# Patient Record
Sex: FEMALE | Race: BLACK OR AFRICAN AMERICAN | NOT HISPANIC OR LATINO
[De-identification: names, ages, dates, MRNs, and addresses within clinical notes are randomized per-mention and may not be internally consistent; named-entity substitution may affect disease eponyms.]

---

## 2017-03-24 ENCOUNTER — APPOINTMENT (OUTPATIENT)
Dept: FAMILY MEDICINE | Facility: CLINIC | Age: 62
End: 2017-03-24

## 2017-04-25 ENCOUNTER — APPOINTMENT (OUTPATIENT)
Dept: FAMILY MEDICINE | Facility: CLINIC | Age: 62
End: 2017-04-25

## 2017-04-25 VITALS
BODY MASS INDEX: 33.11 KG/M2 | RESPIRATION RATE: 14 BRPM | SYSTOLIC BLOOD PRESSURE: 130 MMHG | WEIGHT: 206 LBS | HEART RATE: 72 BPM | TEMPERATURE: 98 F | DIASTOLIC BLOOD PRESSURE: 80 MMHG | HEIGHT: 66 IN

## 2017-04-25 DIAGNOSIS — Z12.12 ENCOUNTER FOR SCREENING FOR MALIGNANT NEOPLASM OF COLON: ICD-10-CM

## 2017-04-25 DIAGNOSIS — Z12.11 ENCOUNTER FOR SCREENING FOR MALIGNANT NEOPLASM OF COLON: ICD-10-CM

## 2018-03-05 ENCOUNTER — APPOINTMENT (OUTPATIENT)
Dept: FAMILY MEDICINE | Facility: CLINIC | Age: 63
End: 2018-03-05
Payer: COMMERCIAL

## 2018-03-05 VITALS
HEART RATE: 76 BPM | WEIGHT: 206 LBS | TEMPERATURE: 98 F | OXYGEN SATURATION: 97 % | HEIGHT: 66 IN | RESPIRATION RATE: 14 BRPM | DIASTOLIC BLOOD PRESSURE: 80 MMHG | SYSTOLIC BLOOD PRESSURE: 148 MMHG | BODY MASS INDEX: 33.11 KG/M2

## 2018-03-05 PROCEDURE — 82962 GLUCOSE BLOOD TEST: CPT

## 2018-03-05 PROCEDURE — 99214 OFFICE O/P EST MOD 30 MIN: CPT | Mod: 25

## 2018-03-06 ENCOUNTER — MEDICATION RENEWAL (OUTPATIENT)
Age: 63
End: 2018-03-06

## 2018-03-06 LAB — GLUCOSE BLDC GLUCOMTR-MCNC: 323

## 2018-03-06 RX ORDER — AMOXICILLIN AND CLAVULANATE POTASSIUM 875; 125 MG/1; MG/1
875-125 TABLET, COATED ORAL
Qty: 14 | Refills: 0 | Status: DISCONTINUED | COMMUNITY
Start: 2018-03-05 | End: 2018-03-06

## 2018-04-19 ENCOUNTER — LABORATORY RESULT (OUTPATIENT)
Age: 63
End: 2018-04-19

## 2018-04-20 ENCOUNTER — APPOINTMENT (OUTPATIENT)
Dept: FAMILY MEDICINE | Facility: CLINIC | Age: 63
End: 2018-04-20
Payer: COMMERCIAL

## 2018-04-20 VITALS
RESPIRATION RATE: 14 BRPM | WEIGHT: 200 LBS | DIASTOLIC BLOOD PRESSURE: 80 MMHG | BODY MASS INDEX: 32.28 KG/M2 | TEMPERATURE: 97 F | SYSTOLIC BLOOD PRESSURE: 160 MMHG | HEART RATE: 80 BPM | OXYGEN SATURATION: 99 %

## 2018-04-20 DIAGNOSIS — Z12.31 ENCOUNTER FOR SCREENING MAMMOGRAM FOR MALIGNANT NEOPLASM OF BREAST: ICD-10-CM

## 2018-04-20 PROCEDURE — 99214 OFFICE O/P EST MOD 30 MIN: CPT

## 2018-04-20 RX ORDER — SULFAMETHOXAZOLE AND TRIMETHOPRIM 800; 160 MG/1; MG/1
800-160 TABLET ORAL TWICE DAILY
Qty: 1 | Refills: 0 | Status: COMPLETED | COMMUNITY
Start: 2018-03-06 | End: 2018-04-20

## 2018-04-20 RX ORDER — AMOXICILLIN 500 MG/1
500 CAPSULE ORAL
Qty: 21 | Refills: 0 | Status: COMPLETED | COMMUNITY
Start: 2018-02-13

## 2018-04-26 ENCOUNTER — MEDICATION RENEWAL (OUTPATIENT)
Age: 63
End: 2018-04-26

## 2018-05-18 ENCOUNTER — APPOINTMENT (OUTPATIENT)
Dept: FAMILY MEDICINE | Facility: CLINIC | Age: 63
End: 2018-05-18

## 2018-05-18 NOTE — COUNSELING
[Weight management counseling provided] : Weight management [Healthy eating counseling provided] : healthy eating [Activity counseling provided] : activity [Weight Self Once Weekly] : Weight self once weekly [Low Fat Diet] : Low fat diet [Decrease Portions] : Decrease food portions [Low Salt Diet] : Low salt diet [___ min/wk activity recommended] : [unfilled] min/wk activity recommended [Walking] : Walking [None] : None [Good understanding] : Patient has a good understanding of lifestyle changes and the steps needed to achieve self management goals

## 2018-05-18 NOTE — HISTORY OF PRESENT ILLNESS
[FreeTextEntry1] : HTN BP Check Uncontrolled DM2 [de-identified] : 61 y/o uncontrollled DM2 and HTN here repeat BP taking meds. Pt most recent a1c 10.6 on levimir.  Last time sent DM2 educator FOBT and mammo

## 2018-06-05 ENCOUNTER — APPOINTMENT (OUTPATIENT)
Dept: FAMILY MEDICINE | Facility: CLINIC | Age: 63
End: 2018-06-05
Payer: COMMERCIAL

## 2018-06-05 VITALS
RESPIRATION RATE: 14 BRPM | WEIGHT: 210 LBS | BODY MASS INDEX: 33.75 KG/M2 | HEIGHT: 66 IN | HEART RATE: 85 BPM | OXYGEN SATURATION: 97 % | TEMPERATURE: 98.6 F | SYSTOLIC BLOOD PRESSURE: 160 MMHG | DIASTOLIC BLOOD PRESSURE: 84 MMHG

## 2018-06-05 DIAGNOSIS — Z92.89 PERSONAL HISTORY OF OTHER MEDICAL TREATMENT: ICD-10-CM

## 2018-06-05 DIAGNOSIS — M54.2 CERVICALGIA: ICD-10-CM

## 2018-06-05 DIAGNOSIS — Z23 ENCOUNTER FOR IMMUNIZATION: ICD-10-CM

## 2018-06-05 DIAGNOSIS — Z92.29 PERSONAL HISTORY OF OTHER DRUG THERAPY: ICD-10-CM

## 2018-06-05 DIAGNOSIS — L03.012 CELLULITIS OF LEFT FINGER: ICD-10-CM

## 2018-06-05 PROCEDURE — 99214 OFFICE O/P EST MOD 30 MIN: CPT

## 2018-06-06 PROBLEM — L03.012 PARONYCHIA OF LEFT THUMB: Status: RESOLVED | Noted: 2018-03-05 | Resolved: 2018-06-06

## 2018-06-06 PROBLEM — M54.2 CERVICAL SPINE PAIN: Status: ACTIVE | Noted: 2018-06-05
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
## 2018-06-06 NOTE — COUNSELING
[Weight management counseling provided] : Weight management [Healthy eating counseling provided] : healthy eating [Activity counseling provided] : activity [None] : None [Good understanding] : Patient has a good understanding of lifestyle changes and the steps needed to achieve self management goals [de-identified] : need to comply with meds and get A1c <7 hgoal\par referred DM2 educator again and referrals given foot and eye evaluation\par pt referred PT

## 2018-06-06 NOTE — HEALTH RISK ASSESSMENT
[Intercurrent Urgi Care visits] : went to urgent care [] : No [No falls in past year] : Patient reported no falls in the past year [0] : 2) Feeling down, depressed, or hopeless: Not at all (0) [de-identified] : urgent care visit [[id number]] : 0

## 2018-07-09 ENCOUNTER — OTHER (OUTPATIENT)
Age: 63
End: 2018-07-09

## 2018-07-11 ENCOUNTER — MEDICATION RENEWAL (OUTPATIENT)
Age: 63
End: 2018-07-11

## 2018-07-12 ENCOUNTER — FORM ENCOUNTER (OUTPATIENT)
Age: 63
End: 2018-07-12

## 2018-07-13 ENCOUNTER — OUTPATIENT (OUTPATIENT)
Dept: OUTPATIENT SERVICES | Facility: HOSPITAL | Age: 63
LOS: 1 days | End: 2018-07-13
Payer: COMMERCIAL

## 2018-07-13 ENCOUNTER — APPOINTMENT (OUTPATIENT)
Dept: MAMMOGRAPHY | Facility: HOSPITAL | Age: 63
End: 2018-07-13
Payer: COMMERCIAL

## 2018-07-13 ENCOUNTER — OTHER (OUTPATIENT)
Age: 63
End: 2018-07-13

## 2018-07-13 DIAGNOSIS — Z12.31 ENCOUNTER FOR SCREENING MAMMOGRAM FOR MALIGNANT NEOPLASM OF BREAST: ICD-10-CM

## 2018-07-13 PROCEDURE — 77063 BREAST TOMOSYNTHESIS BI: CPT

## 2018-07-13 PROCEDURE — 77067 SCR MAMMO BI INCL CAD: CPT

## 2018-07-13 PROCEDURE — 77063 BREAST TOMOSYNTHESIS BI: CPT | Mod: 26

## 2018-07-13 PROCEDURE — 77067 SCR MAMMO BI INCL CAD: CPT | Mod: 26

## 2018-07-16 ENCOUNTER — APPOINTMENT (OUTPATIENT)
Dept: FAMILY MEDICINE | Facility: HOSPITAL | Age: 63
End: 2018-07-16

## 2018-07-16 RX ORDER — METFORMIN HYDROCHLORIDE 1000 MG/1
1000 TABLET, EXTENDED RELEASE ORAL DAILY
Qty: 60 | Refills: 5 | Status: DISCONTINUED | COMMUNITY
Start: 2018-04-20 | End: 2018-07-16

## 2018-07-19 ENCOUNTER — APPOINTMENT (OUTPATIENT)
Dept: FAMILY MEDICINE | Facility: CLINIC | Age: 63
End: 2018-07-19

## 2018-07-23 ENCOUNTER — APPOINTMENT (OUTPATIENT)
Dept: MRI IMAGING | Facility: CLINIC | Age: 63
End: 2018-07-23
Payer: COMMERCIAL

## 2018-07-23 ENCOUNTER — OUTPATIENT (OUTPATIENT)
Dept: OUTPATIENT SERVICES | Facility: HOSPITAL | Age: 63
LOS: 1 days | End: 2018-07-23
Payer: COMMERCIAL

## 2018-07-23 DIAGNOSIS — Z00.8 ENCOUNTER FOR OTHER GENERAL EXAMINATION: ICD-10-CM

## 2018-07-23 PROCEDURE — 70551 MRI BRAIN STEM W/O DYE: CPT | Mod: 26

## 2018-07-23 PROCEDURE — 70551 MRI BRAIN STEM W/O DYE: CPT

## 2018-08-01 ENCOUNTER — RX RENEWAL (OUTPATIENT)
Age: 63
End: 2018-08-01

## 2018-08-06 ENCOUNTER — OTHER (OUTPATIENT)
Age: 63
End: 2018-08-06

## 2018-09-14 ENCOUNTER — APPOINTMENT (OUTPATIENT)
Dept: FAMILY MEDICINE | Facility: CLINIC | Age: 63
End: 2018-09-14

## 2018-10-29 ENCOUNTER — APPOINTMENT (OUTPATIENT)
Dept: MRI IMAGING | Facility: CLINIC | Age: 63
End: 2018-10-29

## 2018-10-29 ENCOUNTER — OUTPATIENT (OUTPATIENT)
Dept: OUTPATIENT SERVICES | Facility: HOSPITAL | Age: 63
LOS: 1 days | End: 2018-10-29
Payer: COMMERCIAL

## 2018-10-29 DIAGNOSIS — Z00.8 ENCOUNTER FOR OTHER GENERAL EXAMINATION: ICD-10-CM

## 2018-10-29 PROCEDURE — 72141 MRI NECK SPINE W/O DYE: CPT

## 2018-10-29 PROCEDURE — 72141 MRI NECK SPINE W/O DYE: CPT | Mod: 26

## 2019-02-12 ENCOUNTER — RX RENEWAL (OUTPATIENT)
Age: 64
End: 2019-02-12

## 2019-02-15 ENCOUNTER — APPOINTMENT (OUTPATIENT)
Dept: FAMILY MEDICINE | Facility: CLINIC | Age: 64
End: 2019-02-15
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
## 2019-02-15 NOTE — HISTORY OF PRESENT ILLNESS
[FreeTextEntry1] : f/u [de-identified] : 62 y/o PMHX HTN HLD Uncontrolled DM2 obesity here for f/u

## 2021-04-13 ENCOUNTER — NON-APPOINTMENT (OUTPATIENT)
Age: 66
End: 2021-04-13

## 2021-04-13 ENCOUNTER — INPATIENT (INPATIENT)
Facility: HOSPITAL | Age: 66
LOS: 7 days | Discharge: NURSING, REHAB OR LONG-TERM CARE | DRG: 66 | End: 2021-04-21
Attending: INTERNAL MEDICINE | Admitting: INTERNAL MEDICINE
Payer: COMMERCIAL

## 2021-04-13 VITALS
HEART RATE: 88 BPM | SYSTOLIC BLOOD PRESSURE: 187 MMHG | WEIGHT: 197.98 LBS | RESPIRATION RATE: 17 BRPM | TEMPERATURE: 98 F | HEIGHT: 67 IN | OXYGEN SATURATION: 97 % | DIASTOLIC BLOOD PRESSURE: 104 MMHG

## 2021-04-13 DIAGNOSIS — R20.0 ANESTHESIA OF SKIN: ICD-10-CM

## 2021-04-13 DIAGNOSIS — Z98.890 OTHER SPECIFIED POSTPROCEDURAL STATES: Chronic | ICD-10-CM

## 2021-04-13 LAB
ALBUMIN SERPL ELPH-MCNC: 3.4 G/DL — SIGNIFICANT CHANGE UP (ref 3.3–5)
ALP SERPL-CCNC: 99 U/L — SIGNIFICANT CHANGE UP (ref 40–120)
ALT FLD-CCNC: 32 U/L — SIGNIFICANT CHANGE UP (ref 10–45)
ANION GAP SERPL CALC-SCNC: 6 MMOL/L — SIGNIFICANT CHANGE UP (ref 5–17)
APTT BLD: 34.8 SEC — SIGNIFICANT CHANGE UP (ref 27.5–35.5)
AST SERPL-CCNC: 37 U/L — SIGNIFICANT CHANGE UP (ref 10–40)
BASOPHILS # BLD AUTO: 0.05 K/UL — SIGNIFICANT CHANGE UP (ref 0–0.2)
BASOPHILS NFR BLD AUTO: 0.8 % — SIGNIFICANT CHANGE UP (ref 0–2)
BILIRUB SERPL-MCNC: 0.5 MG/DL — SIGNIFICANT CHANGE UP (ref 0.2–1.2)
BUN SERPL-MCNC: 10 MG/DL — SIGNIFICANT CHANGE UP (ref 7–23)
CALCIUM SERPL-MCNC: 9.7 MG/DL — SIGNIFICANT CHANGE UP (ref 8.4–10.5)
CHLORIDE SERPL-SCNC: 99 MMOL/L — SIGNIFICANT CHANGE UP (ref 96–108)
CO2 BLDV-SCNC: 32 MMOL/L — HIGH (ref 21–29)
CO2 SERPL-SCNC: 30 MMOL/L — SIGNIFICANT CHANGE UP (ref 22–31)
CREAT SERPL-MCNC: 1.16 MG/DL — SIGNIFICANT CHANGE UP (ref 0.5–1.3)
EOSINOPHIL # BLD AUTO: 0.22 K/UL — SIGNIFICANT CHANGE UP (ref 0–0.5)
EOSINOPHIL NFR BLD AUTO: 3.3 % — SIGNIFICANT CHANGE UP (ref 0–6)
GLUCOSE BLDC GLUCOMTR-MCNC: 237 MG/DL — HIGH (ref 70–99)
GLUCOSE BLDC GLUCOMTR-MCNC: 256 MG/DL — HIGH (ref 70–99)
GLUCOSE BLDC GLUCOMTR-MCNC: 276 MG/DL — HIGH (ref 70–99)
GLUCOSE BLDC GLUCOMTR-MCNC: 436 MG/DL — HIGH (ref 70–99)
GLUCOSE SERPL-MCNC: 511 MG/DL — CRITICAL HIGH (ref 70–99)
HCT VFR BLD CALC: 41.3 % — SIGNIFICANT CHANGE UP (ref 34.5–45)
HGB BLD-MCNC: 13.6 G/DL — SIGNIFICANT CHANGE UP (ref 11.5–15.5)
IMM GRANULOCYTES NFR BLD AUTO: 0.3 % — SIGNIFICANT CHANGE UP (ref 0–1.5)
INR BLD: 1.03 RATIO — SIGNIFICANT CHANGE UP (ref 0.88–1.16)
LYMPHOCYTES # BLD AUTO: 2.81 K/UL — SIGNIFICANT CHANGE UP (ref 1–3.3)
LYMPHOCYTES # BLD AUTO: 42.3 % — SIGNIFICANT CHANGE UP (ref 13–44)
MCHC RBC-ENTMCNC: 28.1 PG — SIGNIFICANT CHANGE UP (ref 27–34)
MCHC RBC-ENTMCNC: 32.9 GM/DL — SIGNIFICANT CHANGE UP (ref 32–36)
MCV RBC AUTO: 85.3 FL — SIGNIFICANT CHANGE UP (ref 80–100)
MONOCYTES # BLD AUTO: 0.51 K/UL — SIGNIFICANT CHANGE UP (ref 0–0.9)
MONOCYTES NFR BLD AUTO: 7.7 % — SIGNIFICANT CHANGE UP (ref 2–14)
NEUTROPHILS # BLD AUTO: 3.04 K/UL — SIGNIFICANT CHANGE UP (ref 1.8–7.4)
NEUTROPHILS NFR BLD AUTO: 45.6 % — SIGNIFICANT CHANGE UP (ref 43–77)
NRBC # BLD: 0 /100 WBCS — SIGNIFICANT CHANGE UP (ref 0–0)
PCO2 BLDV: 53 MMHG — HIGH (ref 39–42)
PH BLDV: 7.38 — SIGNIFICANT CHANGE UP (ref 7.35–7.45)
PLATELET # BLD AUTO: 236 K/UL — SIGNIFICANT CHANGE UP (ref 150–400)
PO2 BLDV: 32 MMHG — SIGNIFICANT CHANGE UP (ref 25–45)
POTASSIUM SERPL-MCNC: 5.4 MMOL/L — HIGH (ref 3.5–5.3)
POTASSIUM SERPL-SCNC: 5.4 MMOL/L — HIGH (ref 3.5–5.3)
PROT SERPL-MCNC: 7.9 G/DL — SIGNIFICANT CHANGE UP (ref 6–8.3)
PROTHROM AB SERPL-ACNC: 12.4 SEC — SIGNIFICANT CHANGE UP (ref 10.6–13.6)
RBC # BLD: 4.84 M/UL — SIGNIFICANT CHANGE UP (ref 3.8–5.2)
RBC # FLD: 12.4 % — SIGNIFICANT CHANGE UP (ref 10.3–14.5)
SAO2 % BLDV: 53 % — LOW (ref 67–88)
SARS-COV-2 RNA SPEC QL NAA+PROBE: SIGNIFICANT CHANGE UP
SODIUM SERPL-SCNC: 135 MMOL/L — SIGNIFICANT CHANGE UP (ref 135–145)
TROPONIN I SERPL-MCNC: <.017 NG/ML — LOW (ref 0.02–0.06)
WBC # BLD: 6.65 K/UL — SIGNIFICANT CHANGE UP (ref 3.8–10.5)
WBC # FLD AUTO: 6.65 K/UL — SIGNIFICANT CHANGE UP (ref 3.8–10.5)

## 2021-04-13 PROCEDURE — 93010 ELECTROCARDIOGRAM REPORT: CPT

## 2021-04-13 PROCEDURE — 71045 X-RAY EXAM CHEST 1 VIEW: CPT | Mod: 26

## 2021-04-13 PROCEDURE — 70450 CT HEAD/BRAIN W/O DYE: CPT | Mod: 26,59,MA

## 2021-04-13 PROCEDURE — 99223 1ST HOSP IP/OBS HIGH 75: CPT

## 2021-04-13 PROCEDURE — 0042T: CPT

## 2021-04-13 PROCEDURE — 99285 EMERGENCY DEPT VISIT HI MDM: CPT

## 2021-04-13 PROCEDURE — 70496 CT ANGIOGRAPHY HEAD: CPT | Mod: 26,MA

## 2021-04-13 PROCEDURE — 70498 CT ANGIOGRAPHY NECK: CPT | Mod: 26,MA

## 2021-04-13 RX ORDER — ATORVASTATIN CALCIUM 80 MG/1
80 TABLET, FILM COATED ORAL AT BEDTIME
Refills: 0 | Status: DISCONTINUED | OUTPATIENT
Start: 2021-04-13 | End: 2021-04-21

## 2021-04-13 RX ORDER — SODIUM CHLORIDE 9 MG/ML
1000 INJECTION, SOLUTION INTRAVENOUS
Refills: 0 | Status: DISCONTINUED | OUTPATIENT
Start: 2021-04-13 | End: 2021-04-21

## 2021-04-13 RX ORDER — INSULIN LISPRO 100/ML
3 VIAL (ML) SUBCUTANEOUS
Refills: 0 | Status: DISCONTINUED | OUTPATIENT
Start: 2021-04-13 | End: 2021-04-14

## 2021-04-13 RX ORDER — ENOXAPARIN SODIUM 100 MG/ML
40 INJECTION SUBCUTANEOUS DAILY
Refills: 0 | Status: DISCONTINUED | OUTPATIENT
Start: 2021-04-13 | End: 2021-04-21

## 2021-04-13 RX ORDER — INSULIN HUMAN 100 [IU]/ML
6 INJECTION, SOLUTION SUBCUTANEOUS ONCE
Refills: 0 | Status: COMPLETED | OUTPATIENT
Start: 2021-04-13 | End: 2021-04-13

## 2021-04-13 RX ORDER — INSULIN LISPRO 100/ML
VIAL (ML) SUBCUTANEOUS
Refills: 0 | Status: DISCONTINUED | OUTPATIENT
Start: 2021-04-13 | End: 2021-04-15

## 2021-04-13 RX ORDER — DEXTROSE 50 % IN WATER 50 %
25 SYRINGE (ML) INTRAVENOUS ONCE
Refills: 0 | Status: DISCONTINUED | OUTPATIENT
Start: 2021-04-13 | End: 2021-04-21

## 2021-04-13 RX ORDER — DEXTROSE 50 % IN WATER 50 %
15 SYRINGE (ML) INTRAVENOUS ONCE
Refills: 0 | Status: DISCONTINUED | OUTPATIENT
Start: 2021-04-13 | End: 2021-04-21

## 2021-04-13 RX ORDER — DEXTROSE 50 % IN WATER 50 %
12.5 SYRINGE (ML) INTRAVENOUS ONCE
Refills: 0 | Status: DISCONTINUED | OUTPATIENT
Start: 2021-04-13 | End: 2021-04-21

## 2021-04-13 RX ORDER — SODIUM CHLORIDE 9 MG/ML
1000 INJECTION INTRAMUSCULAR; INTRAVENOUS; SUBCUTANEOUS ONCE
Refills: 0 | Status: COMPLETED | OUTPATIENT
Start: 2021-04-13 | End: 2021-04-13

## 2021-04-13 RX ORDER — PANTOPRAZOLE SODIUM 20 MG/1
40 TABLET, DELAYED RELEASE ORAL
Refills: 0 | Status: DISCONTINUED | OUTPATIENT
Start: 2021-04-13 | End: 2021-04-21

## 2021-04-13 RX ORDER — GLUCAGON INJECTION, SOLUTION 0.5 MG/.1ML
1 INJECTION, SOLUTION SUBCUTANEOUS ONCE
Refills: 0 | Status: DISCONTINUED | OUTPATIENT
Start: 2021-04-13 | End: 2021-04-21

## 2021-04-13 RX ORDER — ACETAMINOPHEN 500 MG
650 TABLET ORAL EVERY 6 HOURS
Refills: 0 | Status: DISCONTINUED | OUTPATIENT
Start: 2021-04-13 | End: 2021-04-21

## 2021-04-13 RX ORDER — ASPIRIN/CALCIUM CARB/MAGNESIUM 324 MG
325 TABLET ORAL ONCE
Refills: 0 | Status: COMPLETED | OUTPATIENT
Start: 2021-04-13 | End: 2021-04-13

## 2021-04-13 RX ORDER — ASPIRIN/CALCIUM CARB/MAGNESIUM 324 MG
81 TABLET ORAL DAILY
Refills: 0 | Status: DISCONTINUED | OUTPATIENT
Start: 2021-04-14 | End: 2021-04-21

## 2021-04-13 RX ORDER — INSULIN GLARGINE 100 [IU]/ML
10 INJECTION, SOLUTION SUBCUTANEOUS AT BEDTIME
Refills: 0 | Status: DISCONTINUED | OUTPATIENT
Start: 2021-04-14 | End: 2021-04-14

## 2021-04-13 RX ORDER — LABETALOL HCL 100 MG
20 TABLET ORAL ONCE
Refills: 0 | Status: COMPLETED | OUTPATIENT
Start: 2021-04-13 | End: 2021-04-13

## 2021-04-13 RX ORDER — INSULIN LISPRO 100/ML
VIAL (ML) SUBCUTANEOUS AT BEDTIME
Refills: 0 | Status: DISCONTINUED | OUTPATIENT
Start: 2021-04-13 | End: 2021-04-17

## 2021-04-13 RX ADMIN — Medication 325 MILLIGRAM(S): at 22:36

## 2021-04-13 RX ADMIN — SODIUM CHLORIDE 1000 MILLILITER(S): 9 INJECTION INTRAMUSCULAR; INTRAVENOUS; SUBCUTANEOUS at 19:48

## 2021-04-13 RX ADMIN — ATORVASTATIN CALCIUM 80 MILLIGRAM(S): 80 TABLET, FILM COATED ORAL at 22:36

## 2021-04-13 RX ADMIN — Medication 20 MILLIGRAM(S): at 19:48

## 2021-04-13 RX ADMIN — INSULIN HUMAN 6 UNIT(S): 100 INJECTION, SOLUTION SUBCUTANEOUS at 19:47

## 2021-04-13 NOTE — H&P ADULT - NSHPPHYSICALEXAM_GEN_ALL_CORE
Vital Signs Last 24 Hrs  T(C): 36.6 (13 Apr 2021 18:22), Max: 36.6 (13 Apr 2021 18:22)  T(F): 97.8 (13 Apr 2021 18:22), Max: 97.8 (13 Apr 2021 18:22)  HR: 74 (13 Apr 2021 21:33) (74 - 88)  BP: 195/85 (13 Apr 2021 21:33) (187/104 - 195/85)  BP(mean): 115 (13 Apr 2021 21:33) (115 - 115)  RR: 16 (13 Apr 2021 21:33) (16 - 17)  SpO2: 100% (13 Apr 2021 21:33) (97% - 100%)  Daily Height in cm: 170.18 (13 Apr 2021 18:22)    Daily   CAPILLARY BLOOD GLUCOSE      POCT Blood Glucose.: 276 mg/dL (13 Apr 2021 20:47)    I&O's Summary      GENERAL: NAD  HEAD:  Normocephalic  EYES: EOMI, PERRLA, conjunctiva and sclera clear  ENMT: No tonsillar erythema, exudates, or enlargement; Moist mucous membranes, No lesions  NECK: Supple, No JVD, no bruit, normal thyroid  NERVOUS SYSTEM:  Alert & Oriented X3, Good concentration; grossly  Motor Strength 5/5 B/L upper and lower extremities; DTRs 2+ intact and symmetric. no facial droop, tongue midline, neg pronator drift, heel shin and finger nose intact. +altered sensory on R side of face, RUE R torso and RLE  CHEST/LUNG: Clear to auscultation bilaterally; No rales, rhonchi, wheezing, or rubs  HEART: Regular rate and rhythm; No murmurs, rubs, or gallops  ABDOMEN: Soft, Nontender, Nondistended; Bowel sounds present. +umbilical hernia  EXTREMITIES:  2+ Peripheral Pulses, No clubbing, cyanosis, or edema  LYMPH: No lymphadenopathy noted  SKIN: No rashes or lesions Vital Signs Last 24 Hrs  T(C): 36.6 (13 Apr 2021 18:22), Max: 36.6 (13 Apr 2021 18:22)  T(F): 97.8 (13 Apr 2021 18:22), Max: 97.8 (13 Apr 2021 18:22)  HR: 74 (13 Apr 2021 21:33) (74 - 88)  BP: 195/85 (13 Apr 2021 21:33) (187/104 - 195/85)  BP(mean): 115 (13 Apr 2021 21:33) (115 - 115)  RR: 16 (13 Apr 2021 21:33) (16 - 17)  SpO2: 100% (13 Apr 2021 21:33) (97% - 100%)  Daily Height in cm: 170.18 (13 Apr 2021 18:22)    Daily   CAPILLARY BLOOD GLUCOSE      POCT Blood Glucose.: 276 mg/dL (13 Apr 2021 20:47)    I&O's Summary      GENERAL: NAD  HEAD:  Normocephalic  EYES: EOMI, PERRLA, conjunctiva and sclera clear  ENMT: No tonsillar erythema, exudates, or enlargement; Moist mucous membranes, No lesions  NECK: Supple, No JVD, no bruit, normal thyroid  NERVOUS SYSTEM:  Alert & Oriented X3, Good concentration; grossly  Motor Strength 5/5 B/L upper and lower extremities; DTRs 2+ intact and symmetric. slight R facial droop, tongue midline, neg pronator drift, heel shin and finger nose intact. +altered sensory on R side of face, RUE R torso and RLE  CHEST/LUNG: Clear to auscultation bilaterally; No rales, rhonchi, wheezing, or rubs  HEART: Regular rate and rhythm; No murmurs, rubs, or gallops  ABDOMEN: Soft, Nontender, Nondistended; Bowel sounds present. +umbilical hernia  EXTREMITIES:  2+ Peripheral Pulses, No clubbing, cyanosis, or edema  LYMPH: No lymphadenopathy noted  SKIN: No rashes or lesions

## 2021-04-13 NOTE — ED PROVIDER NOTE - CLINICAL SUMMARY MEDICAL DECISION MAKING FREE TEXT BOX
Patient presents with right sided numbness since 11AM. No motor deficit or weakness. Patient notes that she has DM and HTN but takes no meds (after several probing questions). She admits to being non compliant. Only numbness noted (decreased sensation to right side). No cranial nerve deficits or other findings. NIH: 1.  no TPA req'd. CT neg. CT A and Perf studies results reviewed. Pt to be admitted for accelerated HTN and uncontrolled DM. D/W Dr Parks.

## 2021-04-13 NOTE — H&P ADULT - NSHPREVIEWOFSYSTEMS_GEN_ALL_CORE
CONSTITUTIONAL: No fever, weight loss, or fatigue  EYES: No eye pain, visual disturbances, or discharge  ENMT:  No difficulty hearing, tinnitus, vertigo; No sinus or throat pain  NECK: No pain or stiffness  RESPIRATORY: No cough, wheezing, chills or hemoptysis; No shortness of breath  CARDIOVASCULAR: No chest pain, palpitations, dizziness, or leg swelling  GASTROINTESTINAL: No abdominal or epigastric pain. No nausea, vomiting, or hematemesis; No diarrhea or constipation. No melena or hematochezia.  GENITOURINARY: No dysuria, frequency, hematuria, or incontinence  NEUROLOGICAL: No headaches, memory loss, loss of strength, numbness, or tremors. AS PER HPI, R SIDED NUMBNESS>   SKIN: No itching, burning, rashes, or lesions   LYMPH NODES: No enlarged glands  ENDOCRINE: No heat or cold intolerance; No hair loss  MUSCULOSKELETAL: No joint pain or swelling; No muscle, back, or extremity pain  PSYCHIATRIC: No depression, anxiety, mood swings, or difficulty sleeping  HEME/LYMPH: No easy bruising, or bleeding gums  ALLERY AND IMMUNOLOGIC: No hives or eczema

## 2021-04-13 NOTE — H&P ADULT - NSHPLABSRESULTS_GEN_ALL_CORE
13.6   6.65  )-----------( 236      ( 13 Apr 2021 18:48 )             41.3       04-13    135  |  99  |  10  ----------------------------<  511<HH>  5.4<H>   |  30  |  1.16    Ca    9.7      13 Apr 2021 18:48    TPro  7.9  /  Alb  3.4  /  TBili  0.5  /  DBili  x   /  AST  37  /  ALT  32  /  AlkPhos  99  04-13         LIVER FUNCTIONS - ( 13 Apr 2021 18:48 )  Alb: 3.4 g/dL / Pro: 7.9 g/dL / ALK PHOS: 99 U/L / ALT: 32 U/L / AST: 37 U/L / GGT: x               PT/INR - ( 13 Apr 2021 18:48 )   PT: 12.4 sec;   INR: 1.03 ratio         PTT - ( 13 Apr 2021 18:48 )  PTT:34.8 sec    CARDIAC MARKERS ( 13 Apr 2021 18:48 )  <.017 ng/mL / x     / x     / x     / x                CAPILLARY BLOOD GLUCOSE      POCT Blood Glucose.: 276 mg/dL (13 Apr 2021 20:47)  POCT Blood Glucose.: 436 mg/dL (13 Apr 2021 18:33)        EKG: NSR at 82bpm, no acute st changes.       CXR: wet read NAPD    ra< from: CT Angio Neck w/ IV Cont (04.13.21 @ 19:02) >    < from: CT Brain Stroke Protocol (04.13.21 @ 19:01) >    IMPRESSION:  HEAD CT: Mild volume loss, microvascular disease, no acute hemorrhage or midline shift.    < end of copied text >      IMPRESSION:    CT PERFUSION demonstrated: No core infarct. No active ischemia, despite multiple focal severe stenoses by CTA.  If symptoms persist consider follow up head CT or MRI, MRA  if no contraindication.    CTA COW:  Multiple bilateral severe focal stenoses noted in proximal M2 branches of both MCAs and bilaterally in the PCAs.    CTA NECK: Patent, ECAs, ICAs, no  hemodynamically significant stenosis at  ICA origins by NASCET criteria.  Bilateral vertebral arteries are patent without flow limiting stenosis.

## 2021-04-13 NOTE — ED PROVIDER NOTE - PHYSICAL EXAMINATION
Alert & Oriented X3, Good concentration; grossly  Motor Strength 5/5 B/L upper and lower extremities; DTRs 2+ intact and symmetric. slight R facial droop, tongue midline, neg pronator drift, heel shin and finger nose intact. +altered sensory on R side of face, RUE R torso and RLE

## 2021-04-13 NOTE — ED ADULT NURSE NOTE - NSIMPLEMENTINTERV_GEN_ALL_ED
Implemented All Universal Safety Interventions:  [place] to call system. Call bell, personal items and telephone within reach. Instruct patient to call for assistance. Room bathroom lighting operational. Non-slip footwear when patient is off stretcher. Physically safe environment: no spills, clutter or unnecessary equipment. Stretcher in lowest position, wheels locked, appropriate side rails in place.

## 2021-04-13 NOTE — H&P ADULT - HISTORY OF PRESENT ILLNESS
65F hx of HTN, HLD, T2DM off meds since 2018 and has not followed up with PMD since then presenting with R sided numbness. Pt related she has not felt well which started about 4 days ago and felt her balance was off and occasional dizziness. Today after waking up, she took a shower at 11AM and at the time noticed exquisite painful sensitivity in the R lower extremity and throughout the day noted numbness in the RLE and RUE and R side of face and even on the R torso. She went to get a pedicure at around 2 PM and noticed again exquisite painful sensitivity in the R foot when placed in water. She did not have slurring of speech, visual issues, dysphagia, or focal weakness. She went to see PMD and advised ED visit to r/o CVA. In ED, afebrile BP: 187/104 P: 74 sat 100% on RA. CT head and CT angio neg for acute infarct but with focal stenosis in bl M2 MCA and bl PCA. Blood glucose was 511. S/p IV Labetalol 20mg in ED and 6U regular insulin.   Pt feels well otherwise - wanted to sign out AMA because she didn't want to be moved upstairs where her mother had passed.  65F hx of HTN, HLD, T2DM off meds since 2018 and has not followed up with PMD since then presenting with R sided numbness. Pt related she has not felt well which started about 4 days ago and felt her balance was off and occasional dizziness. Today after waking up, she took a shower at 11AM and at the time noticed exquisite painful sensitivity in the R lower extremity and throughout the day noted numbness in the RLE and RUE and R side of face and even on the R torso. She went to get a pedicure at around 2 PM and noticed again exquisite painful sensitivity in the R foot when placed in water. She did not have slurring of speech, visual issues, dysphagia, or focal weakness. She went to see PMD and advised ED visit to r/o CVA. In ED, out of window for tPA.  afebrile BP: 187/104 P: 74 sat 100% on RA. CT head and CT angio neg for acute infarct but with focal stenosis in bl M2 MCA and bl PCA. Blood glucose was 511. S/p IV Labetalol 20mg in ED and 6U regular insulin.   Pt feels well otherwise - wanted to sign out AMA because she didn't want to be moved upstairs where her mother had passed.

## 2021-04-13 NOTE — H&P ADULT - NSICDXFAMILYHX_GEN_ALL_CORE_FT
FAMILY HISTORY:  Father  Still living? Unknown  Family history of CVA, Age at diagnosis: Age Unknown

## 2021-04-13 NOTE — ED PROVIDER NOTE - OBJECTIVE STATEMENT
65F hx of HTN, HLD, T2DM off meds since 2018 and has not followed up with PMD since then presenting with R sided numbness. Pt related she has not felt well which started about 4 days ago and felt her balance was off and occasional dizziness. Today after waking up, she took a shower at 11AM and at the time noticed exquisite painful sensitivity in the R lower extremity and throughout the day noted numbness in the RLE and RUE and R side of face and even on the R torso. She went to get a pedicure at around 2 PM and noticed again exquisite painful sensitivity in the R foot when placed in water. She did not have slurring of speech, visual issues, dysphagia, or focal weakness. She went to see PMD and advised ED visit to r/o CVA.

## 2021-04-13 NOTE — ED PROVIDER NOTE - CARE PLAN
Principal Discharge DX:	Numbness  Secondary Diagnosis:	Hyperglycemia  Secondary Diagnosis:	Accelerated hypertension

## 2021-04-13 NOTE — H&P ADULT - ASSESSMENT
65F hx of HTN, HLD, T2DM noncompliant pw R sided numbness with hypertension and hyperglycemia    #CVA with R sided numbness.   not candidate for tPA - out of window.   asa, statin.   ECHO  check lipid, HgA1c.   Neuro checks  neurology consult.   permissive hypertension overnight    # T2DM  with significant hyperglycemia  start weight based insulin on low end dose.   10 U lantus and tid with meals and adjust upwards     #DVT/GI proph.     CAPRINI SCORE [CLOT]    AGE RELATED RISK FACTORS                                                       MOBILITY RELATED FACTORS  [ ] Age 41-60 years                                            (1 Point)                  [ ] Bed rest                                                        (1 Point)  [2 ] Age: 61-74 years                                           (2 Points)                 [ ] Plaster cast                                                   (2 Points)  [ ] Age= 75 years                                              (3 Points)                 [ ] Bed bound for more than 72 hours                 (2 Points)    DISEASE RELATED RISK FACTORS                                               GENDER SPECIFIC FACTORS  [ ] Edema in the lower extremities                       (1 Point)                  [ ] Pregnancy                                                     (1 Point)  [ ] Varicose veins                                               (1 Point)                  [ ] Post-partum < 6 weeks                                   (1 Point)             [1 ] BMI > 25 Kg/m2                                            (1 Point)                  [ ] Hormonal therapy  or oral contraception          (1 Point)                 [ ] Sepsis (in the previous month)                        (1 Point)                  [ ] History of pregnancy complications                 (1 point)  [ ] Pneumonia or serious lung disease                                               [ ] Unexplained or recurrent                     (1 Point)           (in the previous month)                               (1 Point)  [ ] Abnormal pulmonary function test                     (1 Point)                 SURGERY RELATED RISK FACTORS  [ ] Acute myocardial infarction                              (1 Point)                 [ ]  Section                                             (1 Point)  [ ] Congestive heart failure (in the previous month)  (1 Point)               [ ] Minor surgery                                                  (1 Point)   [ ] Inflammatory bowel disease                             (1 Point)                 [ ] Arthroscopic surgery                                        (2 Points)  [ ] Central venous access                                      (2 Points)                [ ] General surgery lasting more than 45 minutes   (2 Points)       [ ] Stroke (in the previous month)                          (5 Points)               [ ] Elective arthroplasty                                         (5 Points)                                                                                                                                               HEMATOLOGY RELATED FACTORS                                                 TRAUMA RELATED RISK FACTORS  [ ] Prior episodes of VTE                                     (3 Points)                [ ] Fracture of the hip, pelvis, or leg                       (5 Points)  [ ] Positive family history for VTE                         (3 Points)                 [ ] Acute spinal cord injury (in the previous month)  (5 Points)  [ ] Prothrombin 23769 A                                     (3 Points)                 [ ] Paralysis  (less than 1 month)                             (5 Points)  [ ] Factor V Leiden                                             (3 Points)                  [ ] Multiple Trauma within 1 month                        (5 Points)  [ ] Lupus anticoagulants                                     (3 Points)                                                           [ ] Anticardiolipin antibodies                               (3 Points)                                                       [ ] High homocysteine in the blood                      (3 Points)                                             [ ] Other congenital or acquired thrombophilia      (3 Points)                                                [ ] Heparin induced thrombocytopenia                  (3 Points)                                          Total Score [     3     ]    Caprini Score 0 - 2:  Low Risk, No VTE Prophylaxis required for most patients, encourage ambulation  Caprini Score 3 - 6:  At Risk, pharmacologic VTE prophylaxis is indicated for most patients (in the absence of a contraindication)  Caprini Score Greater than or = 7:  High Risk, pharmacologic VTE prophylaxis is indicated for most patients (in the absence of a contraindication) 65F hx of HTN, HLD, T2DM noncompliant pw R sided numbness with hypertension and hyperglycemia    #CVA with R sided numbness/paresthesias   asa, statin.   ECHO  check lipid, HgA1c.   Neuro checks  neurology consult.   permissive hypertension overnight  MRI    # T2DM  with significant hyperglycemia  start weight based insulin on low end dose.   10 U lantus and tid with meals and adjust upwards     #DVT/GI proph.     CAPRINI SCORE [CLOT]    AGE RELATED RISK FACTORS                                                       MOBILITY RELATED FACTORS  [ ] Age 41-60 years                                            (1 Point)                  [ ] Bed rest                                                        (1 Point)  [2 ] Age: 61-74 years                                           (2 Points)                 [ ] Plaster cast                                                   (2 Points)  [ ] Age= 75 years                                              (3 Points)                 [ ] Bed bound for more than 72 hours                 (2 Points)    DISEASE RELATED RISK FACTORS                                               GENDER SPECIFIC FACTORS  [ ] Edema in the lower extremities                       (1 Point)                  [ ] Pregnancy                                                     (1 Point)  [ ] Varicose veins                                               (1 Point)                  [ ] Post-partum < 6 weeks                                   (1 Point)             [1 ] BMI > 25 Kg/m2                                            (1 Point)                  [ ] Hormonal therapy  or oral contraception          (1 Point)                 [ ] Sepsis (in the previous month)                        (1 Point)                  [ ] History of pregnancy complications                 (1 point)  [ ] Pneumonia or serious lung disease                                               [ ] Unexplained or recurrent                     (1 Point)           (in the previous month)                               (1 Point)  [ ] Abnormal pulmonary function test                     (1 Point)                 SURGERY RELATED RISK FACTORS  [ ] Acute myocardial infarction                              (1 Point)                 [ ]  Section                                             (1 Point)  [ ] Congestive heart failure (in the previous month)  (1 Point)               [ ] Minor surgery                                                  (1 Point)   [ ] Inflammatory bowel disease                             (1 Point)                 [ ] Arthroscopic surgery                                        (2 Points)  [ ] Central venous access                                      (2 Points)                [ ] General surgery lasting more than 45 minutes   (2 Points)       [ ] Stroke (in the previous month)                          (5 Points)               [ ] Elective arthroplasty                                         (5 Points)                                                                                                                                               HEMATOLOGY RELATED FACTORS                                                 TRAUMA RELATED RISK FACTORS  [ ] Prior episodes of VTE                                     (3 Points)                [ ] Fracture of the hip, pelvis, or leg                       (5 Points)  [ ] Positive family history for VTE                         (3 Points)                 [ ] Acute spinal cord injury (in the previous month)  (5 Points)  [ ] Prothrombin 16662 A                                     (3 Points)                 [ ] Paralysis  (less than 1 month)                             (5 Points)  [ ] Factor V Leiden                                             (3 Points)                  [ ] Multiple Trauma within 1 month                        (5 Points)  [ ] Lupus anticoagulants                                     (3 Points)                                                           [ ] Anticardiolipin antibodies                               (3 Points)                                                       [ ] High homocysteine in the blood                      (3 Points)                                             [ ] Other congenital or acquired thrombophilia      (3 Points)                                                [ ] Heparin induced thrombocytopenia                  (3 Points)                                          Total Score [     3     ]    Caprini Score 0 - 2:  Low Risk, No VTE Prophylaxis required for most patients, encourage ambulation  Caprini Score 3 - 6:  At Risk, pharmacologic VTE prophylaxis is indicated for most patients (in the absence of a contraindication)  Caprini Score Greater than or = 7:  High Risk, pharmacologic VTE prophylaxis is indicated for most patients (in the absence of a contraindication) 65F hx of HTN, HLD, T2DM noncompliant pw R sided numbness with hypertension and hyperglycemia    #CVA with R sided numbness/paresthesias   asa, statin.   ECHO  check lipid, HgA1c.   Neuro checks  neurology consult.   permissive hypertension overnight  MRI    #HTN  consider ACE if not persistent hyperkalemic    # T2DM  with significant hyperglycemia  start weight based insulin on low end dose.   10 U lantus and tid with meals and adjust upwards     #DVT/GI proph.     CAPRINI SCORE [CLOT]    AGE RELATED RISK FACTORS                                                       MOBILITY RELATED FACTORS  [ ] Age 41-60 years                                            (1 Point)                  [ ] Bed rest                                                        (1 Point)  [2 ] Age: 61-74 years                                           (2 Points)                 [ ] Plaster cast                                                   (2 Points)  [ ] Age= 75 years                                              (3 Points)                 [ ] Bed bound for more than 72 hours                 (2 Points)    DISEASE RELATED RISK FACTORS                                               GENDER SPECIFIC FACTORS  [ ] Edema in the lower extremities                       (1 Point)                  [ ] Pregnancy                                                     (1 Point)  [ ] Varicose veins                                               (1 Point)                  [ ] Post-partum < 6 weeks                                   (1 Point)             [1 ] BMI > 25 Kg/m2                                            (1 Point)                  [ ] Hormonal therapy  or oral contraception          (1 Point)                 [ ] Sepsis (in the previous month)                        (1 Point)                  [ ] History of pregnancy complications                 (1 point)  [ ] Pneumonia or serious lung disease                                               [ ] Unexplained or recurrent                     (1 Point)           (in the previous month)                               (1 Point)  [ ] Abnormal pulmonary function test                     (1 Point)                 SURGERY RELATED RISK FACTORS  [ ] Acute myocardial infarction                              (1 Point)                 [ ]  Section                                             (1 Point)  [ ] Congestive heart failure (in the previous month)  (1 Point)               [ ] Minor surgery                                                  (1 Point)   [ ] Inflammatory bowel disease                             (1 Point)                 [ ] Arthroscopic surgery                                        (2 Points)  [ ] Central venous access                                      (2 Points)                [ ] General surgery lasting more than 45 minutes   (2 Points)       [ ] Stroke (in the previous month)                          (5 Points)               [ ] Elective arthroplasty                                         (5 Points)                                                                                                                                               HEMATOLOGY RELATED FACTORS                                                 TRAUMA RELATED RISK FACTORS  [ ] Prior episodes of VTE                                     (3 Points)                [ ] Fracture of the hip, pelvis, or leg                       (5 Points)  [ ] Positive family history for VTE                         (3 Points)                 [ ] Acute spinal cord injury (in the previous month)  (5 Points)  [ ] Prothrombin 72402 A                                     (3 Points)                 [ ] Paralysis  (less than 1 month)                             (5 Points)  [ ] Factor V Leiden                                             (3 Points)                  [ ] Multiple Trauma within 1 month                        (5 Points)  [ ] Lupus anticoagulants                                     (3 Points)                                                           [ ] Anticardiolipin antibodies                               (3 Points)                                                       [ ] High homocysteine in the blood                      (3 Points)                                             [ ] Other congenital or acquired thrombophilia      (3 Points)                                                [ ] Heparin induced thrombocytopenia                  (3 Points)                                          Total Score [     3     ]    Caprini Score 0 - 2:  Low Risk, No VTE Prophylaxis required for most patients, encourage ambulation  Caprini Score 3 - 6:  At Risk, pharmacologic VTE prophylaxis is indicated for most patients (in the absence of a contraindication)  Caprini Score Greater than or = 7:  High Risk, pharmacologic VTE prophylaxis is indicated for most patients (in the absence of a contraindication)

## 2021-04-14 LAB
A1C WITH ESTIMATED AVERAGE GLUCOSE RESULT: 13.9 % — HIGH (ref 4–5.6)
ALBUMIN SERPL ELPH-MCNC: 3.3 G/DL — SIGNIFICANT CHANGE UP (ref 3.3–5)
ALP SERPL-CCNC: 95 U/L — SIGNIFICANT CHANGE UP (ref 40–120)
ALT FLD-CCNC: 28 U/L — SIGNIFICANT CHANGE UP (ref 10–45)
ANION GAP SERPL CALC-SCNC: 10 MMOL/L — SIGNIFICANT CHANGE UP (ref 5–17)
AST SERPL-CCNC: 29 U/L — SIGNIFICANT CHANGE UP (ref 10–40)
BASOPHILS # BLD AUTO: 0.04 K/UL — SIGNIFICANT CHANGE UP (ref 0–0.2)
BASOPHILS NFR BLD AUTO: 0.8 % — SIGNIFICANT CHANGE UP (ref 0–2)
BILIRUB SERPL-MCNC: 0.3 MG/DL — SIGNIFICANT CHANGE UP (ref 0.2–1.2)
BUN SERPL-MCNC: 9 MG/DL — SIGNIFICANT CHANGE UP (ref 7–23)
CALCIUM SERPL-MCNC: 9 MG/DL — SIGNIFICANT CHANGE UP (ref 8.4–10.5)
CHLORIDE SERPL-SCNC: 100 MMOL/L — SIGNIFICANT CHANGE UP (ref 96–108)
CHOLEST SERPL-MCNC: 206 MG/DL — HIGH
CHOLEST SERPL-MCNC: 221 MG/DL — HIGH
CO2 SERPL-SCNC: 26 MMOL/L — SIGNIFICANT CHANGE UP (ref 22–31)
COVID-19 SPIKE DOMAIN AB INTERP: NEGATIVE — SIGNIFICANT CHANGE UP
COVID-19 SPIKE DOMAIN ANTIBODY RESULT: 0.4 U/ML — SIGNIFICANT CHANGE UP
CREAT SERPL-MCNC: 0.68 MG/DL — SIGNIFICANT CHANGE UP (ref 0.5–1.3)
EOSINOPHIL # BLD AUTO: 0.24 K/UL — SIGNIFICANT CHANGE UP (ref 0–0.5)
EOSINOPHIL NFR BLD AUTO: 4.5 % — SIGNIFICANT CHANGE UP (ref 0–6)
ESTIMATED AVERAGE GLUCOSE: 352 MG/DL — HIGH (ref 68–114)
GLUCOSE BLDC GLUCOMTR-MCNC: 230 MG/DL — HIGH (ref 70–99)
GLUCOSE BLDC GLUCOMTR-MCNC: 290 MG/DL — HIGH (ref 70–99)
GLUCOSE BLDC GLUCOMTR-MCNC: 303 MG/DL — HIGH (ref 70–99)
GLUCOSE BLDC GLUCOMTR-MCNC: 309 MG/DL — HIGH (ref 70–99)
GLUCOSE BLDC GLUCOMTR-MCNC: 331 MG/DL — HIGH (ref 70–99)
GLUCOSE SERPL-MCNC: 366 MG/DL — HIGH (ref 70–99)
HCT VFR BLD CALC: 39 % — SIGNIFICANT CHANGE UP (ref 34.5–45)
HCV AB S/CO SERPL IA: 0.17 S/CO — SIGNIFICANT CHANGE UP (ref 0–0.99)
HCV AB SERPL-IMP: SIGNIFICANT CHANGE UP
HDLC SERPL-MCNC: 37 MG/DL — LOW
HDLC SERPL-MCNC: 39 MG/DL — LOW
HGB BLD-MCNC: 13 G/DL — SIGNIFICANT CHANGE UP (ref 11.5–15.5)
IMM GRANULOCYTES NFR BLD AUTO: 0.4 % — SIGNIFICANT CHANGE UP (ref 0–1.5)
LIPID PNL WITH DIRECT LDL SERPL: 125 MG/DL — HIGH
LIPID PNL WITH DIRECT LDL SERPL: 130 MG/DL — HIGH
LYMPHOCYTES # BLD AUTO: 2.39 K/UL — SIGNIFICANT CHANGE UP (ref 1–3.3)
LYMPHOCYTES # BLD AUTO: 44.9 % — HIGH (ref 13–44)
MCHC RBC-ENTMCNC: 27.8 PG — SIGNIFICANT CHANGE UP (ref 27–34)
MCHC RBC-ENTMCNC: 33.3 GM/DL — SIGNIFICANT CHANGE UP (ref 32–36)
MCV RBC AUTO: 83.3 FL — SIGNIFICANT CHANGE UP (ref 80–100)
MONOCYTES # BLD AUTO: 0.46 K/UL — SIGNIFICANT CHANGE UP (ref 0–0.9)
MONOCYTES NFR BLD AUTO: 8.6 % — SIGNIFICANT CHANGE UP (ref 2–14)
NEUTROPHILS # BLD AUTO: 2.17 K/UL — SIGNIFICANT CHANGE UP (ref 1.8–7.4)
NEUTROPHILS NFR BLD AUTO: 40.8 % — LOW (ref 43–77)
NON HDL CHOLESTEROL: 169 MG/DL — HIGH
NON HDL CHOLESTEROL: 182 MG/DL — HIGH
NRBC # BLD: 0 /100 WBCS — SIGNIFICANT CHANGE UP (ref 0–0)
PLATELET # BLD AUTO: 233 K/UL — SIGNIFICANT CHANGE UP (ref 150–400)
POTASSIUM SERPL-MCNC: 4.1 MMOL/L — SIGNIFICANT CHANGE UP (ref 3.5–5.3)
POTASSIUM SERPL-SCNC: 4.1 MMOL/L — SIGNIFICANT CHANGE UP (ref 3.5–5.3)
PROT SERPL-MCNC: 7.4 G/DL — SIGNIFICANT CHANGE UP (ref 6–8.3)
RBC # BLD: 4.68 M/UL — SIGNIFICANT CHANGE UP (ref 3.8–5.2)
RBC # FLD: 12.6 % — SIGNIFICANT CHANGE UP (ref 10.3–14.5)
SARS-COV-2 IGG+IGM SERPL QL IA: 0.4 U/ML — SIGNIFICANT CHANGE UP
SARS-COV-2 IGG+IGM SERPL QL IA: NEGATIVE — SIGNIFICANT CHANGE UP
SODIUM SERPL-SCNC: 136 MMOL/L — SIGNIFICANT CHANGE UP (ref 135–145)
TRIGL SERPL-MCNC: 195 MG/DL — HIGH
TRIGL SERPL-MCNC: 286 MG/DL — HIGH
TSH SERPL-MCNC: 1.85 UIU/ML — SIGNIFICANT CHANGE UP (ref 0.27–4.2)
WBC # BLD: 5.32 K/UL — SIGNIFICANT CHANGE UP (ref 3.8–10.5)
WBC # FLD AUTO: 5.32 K/UL — SIGNIFICANT CHANGE UP (ref 3.8–10.5)

## 2021-04-14 PROCEDURE — 99223 1ST HOSP IP/OBS HIGH 75: CPT

## 2021-04-14 PROCEDURE — 99233 SBSQ HOSP IP/OBS HIGH 50: CPT

## 2021-04-14 PROCEDURE — 93306 TTE W/DOPPLER COMPLETE: CPT | Mod: 26

## 2021-04-14 RX ORDER — INSULIN LISPRO 100/ML
5 VIAL (ML) SUBCUTANEOUS
Refills: 0 | Status: DISCONTINUED | OUTPATIENT
Start: 2021-04-14 | End: 2021-04-14

## 2021-04-14 RX ORDER — INSULIN GLARGINE 100 [IU]/ML
15 INJECTION, SOLUTION SUBCUTANEOUS AT BEDTIME
Refills: 0 | Status: DISCONTINUED | OUTPATIENT
Start: 2021-04-14 | End: 2021-04-14

## 2021-04-14 RX ORDER — INSULIN GLARGINE 100 [IU]/ML
20 INJECTION, SOLUTION SUBCUTANEOUS AT BEDTIME
Refills: 0 | Status: DISCONTINUED | OUTPATIENT
Start: 2021-04-14 | End: 2021-04-15

## 2021-04-14 RX ORDER — LISINOPRIL 2.5 MG/1
5 TABLET ORAL DAILY
Refills: 0 | Status: DISCONTINUED | OUTPATIENT
Start: 2021-04-14 | End: 2021-04-14

## 2021-04-14 RX ORDER — CLOPIDOGREL BISULFATE 75 MG/1
75 TABLET, FILM COATED ORAL DAILY
Refills: 0 | Status: DISCONTINUED | OUTPATIENT
Start: 2021-04-14 | End: 2021-04-21

## 2021-04-14 RX ORDER — INSULIN LISPRO 100/ML
8 VIAL (ML) SUBCUTANEOUS
Refills: 0 | Status: DISCONTINUED | OUTPATIENT
Start: 2021-04-14 | End: 2021-04-15

## 2021-04-14 RX ORDER — OMEGA-3 ACID ETHYL ESTERS 1 G
2 CAPSULE ORAL
Refills: 0 | Status: DISCONTINUED | OUTPATIENT
Start: 2021-04-14 | End: 2021-04-21

## 2021-04-14 RX ORDER — LISINOPRIL 2.5 MG/1
10 TABLET ORAL DAILY
Refills: 0 | Status: DISCONTINUED | OUTPATIENT
Start: 2021-04-14 | End: 2021-04-16

## 2021-04-14 RX ADMIN — Medication 3: at 00:10

## 2021-04-14 RX ADMIN — Medication 8 UNIT(S): at 12:03

## 2021-04-14 RX ADMIN — Medication 4: at 08:07

## 2021-04-14 RX ADMIN — Medication 8 UNIT(S): at 17:06

## 2021-04-14 RX ADMIN — Medication 3 UNIT(S): at 08:07

## 2021-04-14 RX ADMIN — LISINOPRIL 5 MILLIGRAM(S): 2.5 TABLET ORAL at 05:40

## 2021-04-14 RX ADMIN — Medication 2 GRAM(S): at 17:46

## 2021-04-14 RX ADMIN — Medication 81 MILLIGRAM(S): at 12:03

## 2021-04-14 RX ADMIN — PANTOPRAZOLE SODIUM 40 MILLIGRAM(S): 20 TABLET, DELAYED RELEASE ORAL at 06:02

## 2021-04-14 RX ADMIN — ATORVASTATIN CALCIUM 80 MILLIGRAM(S): 80 TABLET, FILM COATED ORAL at 21:02

## 2021-04-14 RX ADMIN — CLOPIDOGREL BISULFATE 75 MILLIGRAM(S): 75 TABLET, FILM COATED ORAL at 17:51

## 2021-04-14 RX ADMIN — Medication 4: at 17:07

## 2021-04-14 RX ADMIN — INSULIN GLARGINE 20 UNIT(S): 100 INJECTION, SOLUTION SUBCUTANEOUS at 21:03

## 2021-04-14 RX ADMIN — Medication 3: at 12:03

## 2021-04-14 RX ADMIN — ENOXAPARIN SODIUM 40 MILLIGRAM(S): 100 INJECTION SUBCUTANEOUS at 12:03

## 2021-04-14 NOTE — CONSULT NOTE ADULT - SUBJECTIVE AND OBJECTIVE BOX
[facility] Consultation    CHIEF COMPLAINT: Patient is a 65y old  Female who presents with a chief complaint of R sided numbness (14 Apr 2021 10:30)  asked to see patient s/p CVA  patient seen and examined  chart is reviewed  history from patient ... good reliability     HPI:  65F hx of HTN, HLD, T2DM off meds since 2018 and has not followed up with PMD since then presenting with R sided numbness. Pt related she has not felt well which started about 4 days ago and felt her balance was off and occasional dizziness. Today after waking up, she took a shower at 11AM and at the time noticed exquisite painful sensitivity in the R lower extremity and throughout the day noted numbness in the RLE and RUE and R side of face and even on the R torso. She went to get a pedicure at around 2 PM and noticed again exquisite painful sensitivity in the R foot when placed in water. She did not have slurring of speech, visual issues, dysphagia, or focal weakness. She went to see PMD and advised ED visit to r/o CVA. In ED, out of window for tPA.  afebrile BP: 187/104 P: 74 sat 100% on RA. CT head and CT angio neg for acute infarct but with focal stenosis in bl M2 MCA and bl PCA. Blood glucose was 511. S/p IV Labetalol 20mg in ED and 6U regular insulin.   Pt feels well otherwise - wanted to sign out AMA because she didn't want to be moved upstairs where her mother had passed.  (13 Apr 2021 21:39)    As above... patient now on telemetry.  complains of numbness on right side  Denies any cardiac complaints of chest pain,  chest pressure, shortness of breath or dyspnea on exertion.  No palpitations, syncope or near syncope.  No edema. 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 1000 milliLiter(s) (50 mL/Hr) IV Continuous <Continuous>  dextrose 5%. 1000 milliLiter(s) (100 mL/Hr) IV Continuous <Continuous>  dextrose 50% Injectable 25 Gram(s) IV Push once  dextrose 50% Injectable 12.5 Gram(s) IV Push once  dextrose 50% Injectable 25 Gram(s) IV Push once  enoxaparin Injectable 40 milliGRAM(s) SubCutaneous daily  glucagon  Injectable 1 milliGRAM(s) IntraMuscular once  insulin glargine Injectable (LANTUS) 20 Unit(s) SubCutaneous at bedtime  insulin lispro (ADMELOG) corrective regimen sliding scale   SubCutaneous three times a day before meals  insulin lispro (ADMELOG) corrective regimen sliding scale   SubCutaneous at bedtime  insulin lispro Injectable (ADMELOG) 8 Unit(s) SubCutaneous three times a day before meals  lisinopril 5 milliGRAM(s) Oral daily  omega-3-Acid Ethyl Esters 2 Gram(s) Oral two times a day  pantoprazole    Tablet 40 milliGRAM(s) Oral before breakfast    MEDICATIONS  (PRN):  acetaminophen   Tablet .. 650 milliGRAM(s) Oral every 6 hours PRN Temp greater or equal to 38C (100.4F), Mild Pain (1 - 3)      Allergies    No Known Allergies    Intolerances        REVIEW OF SYSTEMS:    CONSTITUTIONAL: fatigue   EYES: No visual changes, No diplopia  ENMT: No throat pain , No exudate  NECK: No pain or stiffness  RESPIRATORY: No cough, wheezing, hemoptysis; No shortness of breath  CARDIOVASCULAR: No chest pain or chest pressure.  No shortness of breath or dyspnea on exertion.  No palpitations, dizziness, light headedness, syncope or near syncope.  No edema, no orthopnea.   GASTROINTESTINAL: No abdominal pain. No nausea, vomiting, or hematemesis; No diarrhea or constipation. No melena or hematochezia.  GENITOURINARY: No dysuria, frequency or hematuria  SKIN: No itching or rash  All other review of systems is negative unless indicated above    VITAL SIGNS:   Vital Signs Last 24 Hrs  T(C): 36.8 (14 Apr 2021 08:07), Max: 36.8 (14 Apr 2021 08:07)  T(F): 98.3 (14 Apr 2021 08:07), Max: 98.3 (14 Apr 2021 08:07)  HR: 89 (14 Apr 2021 08:07) (74 - 89)  BP: 180/94 (14 Apr 2021 08:07) (180/91 - 195/85)  BP(mean): 115 (13 Apr 2021 21:33) (115 - 115)  RR: 15 (14 Apr 2021 08:07) (15 - 18)  SpO2: 100% (14 Apr 2021 08:07) (97% - 100%)    I&O's Summary    14 Apr 2021 07:01  -  14 Apr 2021 15:12  --------------------------------------------------------  IN: 240 mL / OUT: 0 mL / NET: 240 mL        PHYSICAL EXAM:    Constitutional: NAD, awake and alert, well-developed  Eyes:  EOMI,  Pupils round, no lesions  ENMT: no exudate or erythema  Pulmonary: Non-labored, breath sounds are clear bilaterally, No wheezing, rales or rhonchi  Cardiovascular: PMI not palpable non-displaced Regular S1 and S2 l/Vl systolic murmur   Gastrointestinal: Bowel Sounds present, soft, nontender.   Lymph: No peripheral edema. No cervical lymphadenopathy.  Skin: No rashes. Changes of chronic venous stasis. No cyanosis.  Psych:  Mood & affect appropriate    LABS: All Labs Reviewed:                        13.0   5.32  )-----------( 233      ( 14 Apr 2021 05:45 )             39.0                         13.6   6.65  )-----------( 236      ( 13 Apr 2021 18:48 )             41.3     14 Apr 2021 05:45    136    |  100    |  9      ----------------------------<  366    4.1     |  26     |  0.68   13 Apr 2021 18:48    135    |  99     |  10     ----------------------------<  511    5.4     |  30     |  1.16     Ca    9.0        14 Apr 2021 05:45  Ca    9.7        13 Apr 2021 18:48    TPro  7.4    /  Alb  3.3    /  TBili  0.3    /  DBili  x      /  AST  29     /  ALT  28     /  AlkPhos  95     14 Apr 2021 05:45  TPro  7.9    /  Alb  3.4    /  TBili  0.5    /  DBili  x      /  AST  37     /  ALT  32     /  AlkPhos  99     13 Apr 2021 18:48    PT/INR - ( 13 Apr 2021 18:48 )   PT: 12.4 sec;   INR: 1.03 ratio         PTT - ( 13 Apr 2021 18:48 )  PTT:34.8 sec  CARDIAC MARKERS ( 13 Apr 2021 18:48 )  <.017 ng/mL / x     / x     / x     / x              04-14 @ 05:45  TSH: 1.85      RADIOLOGY:  < from: Xray Chest 1 View AP/PA (04.13.21 @ 19:06) >  IMPRESSION: Heart enlargement.    < end of copied text >    EKG: < from: 12 Lead ECG (04.13.21 @ 19:36) >  Normal sinus rhythm  Minimal voltage criteria for LVH, may be normal variant  Nonspecific T wave abnormality  Abnormal ECG  No previous ECGs available    < end of copied text >    < from: TTE Echo Complete w/o Contrast w/ Doppler (04.14.21 @ 08:52) >   1. Left ventricular ejection fraction, by visual estimation, is 60 to 65%.   2. Normal global left ventricular systolic function.   3. Normal left ventricular internal cavity size.   4. Spectral Doppler shows impaired relaxation pattern ofleft ventricular myocardial filling (Grade I diastolic dysfunction).   5. There is mild asymmetric left ventricular hypertrophy.   6. Normal right ventricular size and function.   7. The left atrium is normal in size.   8. Thickening of the anterior and posterior mitral valve leaflets.   9. Trace mitral valve regurgitation.  10. Mild tricuspid regurgitation.  11. Sclerotic aortic valve with normal opening.  12. LA volume Index is 20.7 ml/m² ml/m2.    < end of copied text >

## 2021-04-14 NOTE — PHARMACOTHERAPY INTERVENTION NOTE - COMMENTS
Met with patient and reviewed current and new medications. Pt states she was not taking any medications prior to admission. Discussed reasons for use, directions for use and possible side effects of each. Pt was appreciative and all questions answered.

## 2021-04-14 NOTE — PROGRESS NOTE ADULT - SUBJECTIVE AND OBJECTIVE BOX
Patient is a 65y old  Female who presents with a chief complaint of R sided numbness (13 Apr 2021 21:39)    Patient seen and examined at bedside.  no acute complaints this am    ALLERGIES:  No Known Allergies        Vital Signs Last 24 Hrs  T(F): 98.3 (14 Apr 2021 08:07), Max: 98.3 (14 Apr 2021 08:07)  HR: 89 (14 Apr 2021 08:07) (74 - 89)  BP: 180/94 (14 Apr 2021 08:07) (180/91 - 195/85)  RR: 15 (14 Apr 2021 08:07) (15 - 18)  SpO2: 100% (14 Apr 2021 08:07) (97% - 100%)  I&O's Summary    MEDICATIONS:  acetaminophen   Tablet .. 650 milliGRAM(s) Oral every 6 hours PRN  aspirin enteric coated 81 milliGRAM(s) Oral daily  atorvastatin 80 milliGRAM(s) Oral at bedtime  dextrose 40% Gel 15 Gram(s) Oral once  dextrose 5%. 1000 milliLiter(s) IV Continuous <Continuous>  dextrose 5%. 1000 milliLiter(s) IV Continuous <Continuous>  dextrose 50% Injectable 25 Gram(s) IV Push once  dextrose 50% Injectable 12.5 Gram(s) IV Push once  dextrose 50% Injectable 25 Gram(s) IV Push once  enoxaparin Injectable 40 milliGRAM(s) SubCutaneous daily  glucagon  Injectable 1 milliGRAM(s) IntraMuscular once  insulin glargine Injectable (LANTUS) 10 Unit(s) SubCutaneous at bedtime  insulin lispro (ADMELOG) corrective regimen sliding scale   SubCutaneous three times a day before meals  insulin lispro (ADMELOG) corrective regimen sliding scale   SubCutaneous at bedtime  insulin lispro Injectable (ADMELOG) 3 Unit(s) SubCutaneous before breakfast  insulin lispro Injectable (ADMELOG) 3 Unit(s) SubCutaneous before lunch  insulin lispro Injectable (ADMELOG) 3 Unit(s) SubCutaneous before dinner  lisinopril 5 milliGRAM(s) Oral daily  pantoprazole    Tablet 40 milliGRAM(s) Oral before breakfast      PHYSICAL EXAM:  General: NAD, A/O x 3  ENT: MMM, no thrush  Neck: Supple, No JVD  Lungs: Clear to auscultation bilaterally, non labored breathing  Cardio: RRR, S1/S2, No murmurs  Abdomen: Soft, Nontender, Nondistended; Bowel sounds present  Extremities: No cyanosis, No edema    LABS:                        13.0   5.32  )-----------( 233      ( 14 Apr 2021 05:45 )             39.0     04-14    136  |  100  |  9   ----------------------------<  366  4.1   |  26  |  0.68    Ca    9.0      14 Apr 2021 05:45    TPro  7.4  /  Alb  3.3  /  TBili  0.3  /  DBili  x   /  AST  29  /  ALT  28  /  AlkPhos  95  04-14    eGFR if Non : 93 mL/min/1.73M2 (04-14-21 @ 05:45)  eGFR if : 108 mL/min/1.73M2 (04-14-21 @ 05:45)    PT/INR - ( 13 Apr 2021 18:48 )   PT: 12.4 sec;   INR: 1.03 ratio         PTT - ( 13 Apr 2021 18:48 )  PTT:34.8 sec    CARDIAC MARKERS ( 13 Apr 2021 18:48 )  <.017 ng/mL / x     / x     / x     / x          04-14 Chol 206 mg/dL LDL -- HDL 37 mg/dL Trig 195 mg/dL  TSH 1.85   TSH with FT4 reflex --  Total T3 --    < from: CT Angio Neck w/ IV Cont (04.13.21 @ 19:02) >  IMPRESSION:    CT PERFUSION demonstrated: No core infarct. No active ischemia, despite multiple focal severe stenoses by CTA.  If symptoms persist consider follow up head CT or MRI, MRA  if no contraindication.    CTA COW:  Multiple bilateral severe focal stenoses noted in proximal M2 branches of both MCAs and bilaterally in the PCAs.    CTA NECK: Patent, ECAs, ICAs, no  hemodynamically significant stenosis at  ICA origins by NASCET criteria.  Bilateral vertebral arteries are patent without flow limiting stenosis.     Discussed with Dr. Barakat in the ED at 7:04 PM. MRI may be helpful for further evaluation, if clinically indicated.              BEN PILLAI MD, Attending Radiologist  This document has been electronically signed. Apr 13 2021  7:11PM    < end of copied text >  18:45 - VBG - pH: 7.38  | pCO2: 53    | pO2: 32    | Lactate:                  POCT Blood Glucose.: 331 mg/dL (14 Apr 2021 08:02)  POCT Blood Glucose.: 309 mg/dL (14 Apr 2021 06:01)  POCT Blood Glucose.: 256 mg/dL (13 Apr 2021 23:57)  POCT Blood Glucose.: 237 mg/dL (13 Apr 2021 22:39)  POCT Blood Glucose.: 276 mg/dL (13 Apr 2021 20:47)  POCT Blood Glucose.: 436 mg/dL (13 Apr 2021 18:33)            RADIOLOGY & ADDITIONAL TESTS:    < from: CT Brain Stroke Protocol (04.13.21 @ 19:01) >  IMPRESSION:  HEAD CT: Mild volume loss, microvascular disease, no acute hemorrhage or midline shift.    Discussed with Dr. Barakat in the ED at 6:44 PM.              BEN PILLAI MD, Attending Radiologist  This document has been electronically signed. Apr 13 2021  6:45PM    < end of copied text >      Care Discussed with Consultants/Other Providers:    Patient is a 65y old  Female who presents with a chief complaint of R sided numbness (13 Apr 2021 21:39)    Patient seen and examined at bedside. No overnight events.   Continues to have persistent R sided numbness of right side of face, arms, legs.   Reports medication non-compliance, does not take any medications as prescribed at home. Was told to be on insulin and metformin but she did not want to inject herself.   Admits to "sticky" urine.     ALLERGIES:  No Known Allergies        Vital Signs Last 24 Hrs  T(F): 98.3 (14 Apr 2021 08:07), Max: 98.3 (14 Apr 2021 08:07)  HR: 89 (14 Apr 2021 08:07) (74 - 89)  BP: 180/94 (14 Apr 2021 08:07) (180/91 - 195/85)  RR: 15 (14 Apr 2021 08:07) (15 - 18)  SpO2: 100% (14 Apr 2021 08:07) (97% - 100%)  I&O's Summary    MEDICATIONS:  acetaminophen   Tablet .. 650 milliGRAM(s) Oral every 6 hours PRN  aspirin enteric coated 81 milliGRAM(s) Oral daily  atorvastatin 80 milliGRAM(s) Oral at bedtime  dextrose 40% Gel 15 Gram(s) Oral once  dextrose 5%. 1000 milliLiter(s) IV Continuous <Continuous>  dextrose 5%. 1000 milliLiter(s) IV Continuous <Continuous>  dextrose 50% Injectable 25 Gram(s) IV Push once  dextrose 50% Injectable 12.5 Gram(s) IV Push once  dextrose 50% Injectable 25 Gram(s) IV Push once  enoxaparin Injectable 40 milliGRAM(s) SubCutaneous daily  glucagon  Injectable 1 milliGRAM(s) IntraMuscular once  insulin glargine Injectable (LANTUS) 10 Unit(s) SubCutaneous at bedtime  insulin lispro (ADMELOG) corrective regimen sliding scale   SubCutaneous three times a day before meals  insulin lispro (ADMELOG) corrective regimen sliding scale   SubCutaneous at bedtime  insulin lispro Injectable (ADMELOG) 3 Unit(s) SubCutaneous before breakfast  insulin lispro Injectable (ADMELOG) 3 Unit(s) SubCutaneous before lunch  insulin lispro Injectable (ADMELOG) 3 Unit(s) SubCutaneous before dinner  lisinopril 5 milliGRAM(s) Oral daily  pantoprazole    Tablet 40 milliGRAM(s) Oral before breakfast      PHYSICAL EXAM:  General: NAD, A/O x 3  ENT: MMM, no thrush  Neck: Supple, No JVD  Lungs: Clear to auscultation bilaterally, non labored breathing  Cardio: RRR, S1/S2, No murmurs  Abdomen: Soft, Nontender, Nondistended; Bowel sounds present  Extremities: No cyanosis, No edema  Neuro: moves all extremities 5/5 muscle strength, decreased sensation in R side face, full arms, full legs    LABS:                        13.0   5.32  )-----------( 233      ( 14 Apr 2021 05:45 )             39.0     04-14    136  |  100  |  9   ----------------------------<  366  4.1   |  26  |  0.68    Ca    9.0      14 Apr 2021 05:45    TPro  7.4  /  Alb  3.3  /  TBili  0.3  /  DBili  x   /  AST  29  /  ALT  28  /  AlkPhos  95  04-14    eGFR if Non : 93 mL/min/1.73M2 (04-14-21 @ 05:45)  eGFR if : 108 mL/min/1.73M2 (04-14-21 @ 05:45)    PT/INR - ( 13 Apr 2021 18:48 )   PT: 12.4 sec;   INR: 1.03 ratio         PTT - ( 13 Apr 2021 18:48 )  PTT:34.8 sec    CARDIAC MARKERS ( 13 Apr 2021 18:48 )  <.017 ng/mL / x     / x     / x     / x          04-14 Chol 206 mg/dL LDL -- HDL 37 mg/dL Trig 195 mg/dL  TSH 1.85   TSH with FT4 reflex --  Total T3 --    < from: CT Angio Neck w/ IV Cont (04.13.21 @ 19:02) >  IMPRESSION:    CT PERFUSION demonstrated: No core infarct. No active ischemia, despite multiple focal severe stenoses by CTA.  If symptoms persist consider follow up head CT or MRI, MRA  if no contraindication.    CTA COW:  Multiple bilateral severe focal stenoses noted in proximal M2 branches of both MCAs and bilaterally in the PCAs.    CTA NECK: Patent, ECAs, ICAs, no  hemodynamically significant stenosis at  ICA origins by NASCET criteria.  Bilateral vertebral arteries are patent without flow limiting stenosis.     Discussed with Dr. Barakat in the ED at 7:04 PM. MRI may be helpful for further evaluation, if clinically indicated.              BEN PILLAI MD, Attending Radiologist  This document has been electronically signed. Apr 13 2021  7:11PM    < end of copied text >  18:45 - VBG - pH: 7.38  | pCO2: 53    | pO2: 32    | Lactate:                  POCT Blood Glucose.: 331 mg/dL (14 Apr 2021 08:02)  POCT Blood Glucose.: 309 mg/dL (14 Apr 2021 06:01)  POCT Blood Glucose.: 256 mg/dL (13 Apr 2021 23:57)  POCT Blood Glucose.: 237 mg/dL (13 Apr 2021 22:39)  POCT Blood Glucose.: 276 mg/dL (13 Apr 2021 20:47)  POCT Blood Glucose.: 436 mg/dL (13 Apr 2021 18:33)            RADIOLOGY & ADDITIONAL TESTS:    < from: CT Brain Stroke Protocol (04.13.21 @ 19:01) >  IMPRESSION:  HEAD CT: Mild volume loss, microvascular disease, no acute hemorrhage or midline shift.    Discussed with Dr. Barakat in the ED at 6:44 PM.              BEN PILLAI MD, Attending Radiologist  This document has been electronically signed. Apr 13 2021  6:45PM    < end of copied text >      Care Discussed with Consultants/Other Providers:   Discussed with neuro Dr. Owen, cardio Dr. Strickland

## 2021-04-14 NOTE — PROGRESS NOTE ADULT - ASSESSMENT
65 female non compliant t2dm, htn, hld, presented with right sided numbness uncontrolled htn and hyperglycemia.    # CVA with RT sided paresthesias  s/p code stoke in ED  CTH unremarkable  CTA revealed multiple B/L severe focal stenoses prox M2 branch and both MCAs B/L in PCA  lipid profile reviewed, follow up echo  neuro consult pending, may require MRI  continue asa, statin therapies  neuro checks, permissive htn 24 hours    # T2DM with hyperglycemia  follow up A1c  started on lantus, premeal insulin  plan to inc lantus 15 u qhs, premeal insulin 5 u TID, maintain correction  continue to titrate up as needed  monitor BS    # HTN  chronic condition  continue lisinopril 5 mg daily   permissive htn for 24 hours  monitor BP carefully    # HLD  chronic condition  chol 221 tri 286 hdl 39 ldl 125  maintain high dose statin therapy - lipitor 80 daily    # Prophylactic Measure  protonix, lovenox    attempted to call patient's spouse mira richmond [phone] no answer and no option to leave voice message 65 female non compliant t2dm, htn, hld, presented with right sided numbness uncontrolled htn and hyperglycemia.    # CVA with RT sided paresthesias  s/p code stoke in ED  CTH unremarkable  CTA revealed multiple B/L severe focal stenoses prox M2 branch and both MCAs B/L in PCA  lipid profile reviewed, echo reviewed  MRI ordered  continue high dose statin  neuro checks, permissive htn 24 hours  Neuro consulted recommendations appreciated - start ASA and Plavix x 3 weeks then ASA monotherapy  Cardio consulted recommendations appreciated - TTE reviewed. Continue tele monitoring, may need long term monitor v LON    # T2DM with hyperglycemia uncontrolled  A1c 13.9  Lantus 20 units qHS, Admelog 8 units pre-meal, low dose insulin sliding scale monitor BS    # HTN  chronic condition  Increase lisinopril 10 mg daily   permissive htn for 24 hours  monitor BP carefully    # HLD  chronic condition  chol 221 tri 286 hdl 39 ldl 125  maintain high dose statin therapy - lipitor 80 daily, start Loveza     # Prophylactic Measure  protonix, lovenox    attempted to call patient's spouse mira richmond [phone] no answer and no option to leave voice message

## 2021-04-14 NOTE — CONSULT NOTE ADULT - SUBJECTIVE AND OBJECTIVE BOX
Patient is a 65 year old woman admitted yesterday, 4/13/21 with right face, arm, and leg numbness and tingling. She states yesterday morning noted the numbness and tingling suddenly involving the right face, arm, and leg which has persisted. She also notes HA which is generalized. She also notes lightheadedness on standing. She denies other neurological complaints. She denies fever, chest pain, trauma.    PMH: DM- Dr. Marie Dunne          HTN    SH No allergy    Exam :Awake, alert, appropriate           Pupils 2.5mm, EOM intact, no nystagmus, VFF           CN II-XII intact           Motor tone and strength normal           Gait slow hesitant           Sensation decreased right face, arm, and leg                          13.0   5.32  )-----------( 233      ( 14 Apr 2021 05:45 )             39.0       04-14    136  |  100  |  9   ----------------------------<  366<H>  4.1   |  26  |  0.68    Ca    9.0      14 Apr 2021 05:45    TPro  7.4  /  Alb  3.3  /  TBili  0.3  /  DBili  x   /  AST  29  /  ALT  28  /  AlkPhos  95  04-14    Lipid Profile in AM (04.14.21 @ 05:45)    Cholesterol, Serum: 206 mg/dL    Triglycerides, Serum: 195 mg/dL    HDL Cholesterol, Serum: 37 mg/dL    Non HDL Cholesterol: 169: Patient’s Atherosclerotic Cardiovascular Disease (ASCVD) Risk  Optimal Level (mg/dL)  LDL Cholesterol (LDL-C)  All Patients                                < 100  ASCVD at Very High Risk1    < 70  Non-HDL Cholesterol (Non-HDL-C)  All Patients                        < 130  ASCVD at Very High Risk1   < 100  Non-HDL-Cholesterol (Non-HDL-C) is also a key target for cardiovascular  risk reduction.  Consider Familial Hypercholesterolemia when: LDL-C > 190 mg/dL or  Non-HDL-C > 220 mg/dL.  LDL-C calculation using the Friedewald equation is not provided when  triglycerides > 400 mg/dL, in which case we recommend repeating the test  after fasting, if it was not done before.  When triglycerides >150 mg/dL, calculated LDL-C is provided but may still  be inaccurate (particularly when LDL-C < 70 mg/dL). It can be  recalculated off-line using other equations (e.g. Nahun SS, Leilani MJ,  Irish MB, et al.DEE 2013;310:2061 - 8).  1 Bridger Bernstein,et al. "2019 AHA/ACC. . . guideline on the  management of blood cholesterol: a report of the American College of  Cardiology/American Heart Association Task Force on Clinical Practice  Guidelines." Circulation;139:e1082 - e1143.  These values apply only to persons 20 years and older.  Lipid Panel updated with new test, reference ranges and interpretive  comments effective 10-. mg/dL    LDL Cholesterol Calculated: 130 mg/dL            < from: CT Brain Stroke Protocol (04.13.21 @ 19:01) >  ARISON EXAMINATION: None.    FINDINGS:  HEAD CT:  VENTRICLES AND SULCI: Ventricles and sulci are unremarkable for patient age.  INTRA-AXIAL: No intracranial mass, acute hemorrhage, or midline shift is present.There is non-specific decreased attenuation in the white matter likely microvascular disease.  EXTRA-AXIAL: No extra-axial fluid collection is present.  INTRACRANIAL HEMORRHAGE: None.    VISUALIZED SINUSES: No air-fluid levels are identified.  VISUALIZED MASTOIDS:  Clear.  CALVARIUM:  Intact.  MISCELLANEOUS:None.    SOFT TISSUES: Unremarkable.  BONES: Unremarkable.      IMPRESSION:  HEAD CT: Mild volume loss, microvascular disease, no acute hemorrhage or midline shift.    Discussed with Dr. Barakat in the ED at 6:44 PM.          < from: CT Perfusion w/ Maps w/ IV Cont (04.13.21 @ 19:02) >  PARISON EXAMINATION: None.    FINDINGS:    CT RAPID PERFUSION:  CBF<30% volume: 0 ml  Tmax>6.0 s volume: 0 ml  Mismatch volume: 0 ml  Mismatch ratio: none    CORE INFARCT: None.  TISSUE AT RISK: None.  MISMATCH RATIO: None.      CTA [place] OF [place]ITH:  ANTERIOR CIRCULATION  ICA  CAVERNOUS, SUPRACLINOID, BIFURCATION SEGMENTS: Patent without flow limiting stenosis.    ANTERIOR CEREBRAL ARTERIES: Bilateral A1, anterior communicating and A2 anterior cerebral arteries are unremarkable in course and caliber without flow limiting stenosis.    MIDDLE CEREBRAL ARTERIES: Patent bilateral M1 branches without flow limiting stenosis.  There are severe focal stenoses bilaterally in the proximal M2 branches of the MCA.    POSTERIOR CIRCULATION:  VERTEBRAL ARTERIES: Patent without flow limiting stenosis.  BASILAR ARTERY: Patent no flow limiting stenosis.      POSTERIOR CEREBRAL ARTERIES: Patent without flow limiting stenosis. Severe focal stenosis in the mid right PCA and diffuse long segment luminal irregularity in the leftPCA.      CTA NECK:  GREAT VESSELS: Visualized segments are patent, no flow limiting stenosis.    COMMON CAROTID ARTERIES:  RIGHT CCA: Patent without flow limiting stenosis  LEFT CCA: Patent without flow limiting stenosis    CAROTID BULBS:  RIGHT CB:Patent without flow limiting stenosis  LEFT CB: Patent without flow limiting stenosis    INTERNAL CAROTID ARTERIES:  RIGHT ICA: Patent no evidence for any hemodynamically significant stenosis at the ICA origin by NASCET criteria.  LEFT ICA: Patent noevidence for any hemodynamically significant stenosis at the ICA origin by NASCET criteria.    VERTEBRAL ARTERIES:  RIGHT VA: Patent no evidence for any flow limiting stenosis.  LEFT VA: Patent no evidence for any flow limiting stenosis.      SOFT TISSUES: Unremarkable  BONES: Unremarkable      IMPRESSION:    CT PERFUSION demonstrated: No core infarct. No active ischemia, despite multiple focal severe stenoses by CTA.  If symptoms persist consider follow up head CT or MRI, MRA  if no contraindication.    CTA COW:  Multiple bilateral severe focal stenoses noted in proximal M2 branches of both MCAs and bilaterally in the PCAs.    CTA NECK: Patent, ECAs, ICAs, no  hemodynamically significant stenosis at  ICA origins by NASCET criteria.  Bilateral vertebral arteries are patent without flow limiting stenosis.     Discussed with Dr. Barakat in the ED at 7:04 PM. MRI may be helpful for further evaluation, if clinically indicated.

## 2021-04-15 ENCOUNTER — APPOINTMENT (OUTPATIENT)
Dept: FAMILY MEDICINE | Facility: CLINIC | Age: 66
End: 2021-04-15

## 2021-04-15 ENCOUNTER — APPOINTMENT (OUTPATIENT)
Dept: MRI IMAGING | Facility: HOSPITAL | Age: 66
End: 2021-04-15

## 2021-04-15 PROBLEM — I10 ESSENTIAL (PRIMARY) HYPERTENSION: Chronic | Status: ACTIVE | Noted: 2021-04-13

## 2021-04-15 PROBLEM — E11.9 TYPE 2 DIABETES MELLITUS WITHOUT COMPLICATIONS: Chronic | Status: ACTIVE | Noted: 2021-04-13

## 2021-04-15 PROBLEM — E78.5 HYPERLIPIDEMIA, UNSPECIFIED: Chronic | Status: ACTIVE | Noted: 2021-04-13

## 2021-04-15 LAB
ALBUMIN SERPL ELPH-MCNC: 3.2 G/DL — LOW (ref 3.3–5)
ALP SERPL-CCNC: 70 U/L — SIGNIFICANT CHANGE UP (ref 40–120)
ALT FLD-CCNC: 38 U/L — SIGNIFICANT CHANGE UP (ref 10–45)
ANION GAP SERPL CALC-SCNC: 8 MMOL/L — SIGNIFICANT CHANGE UP (ref 5–17)
AST SERPL-CCNC: 32 U/L — SIGNIFICANT CHANGE UP (ref 10–40)
BILIRUB SERPL-MCNC: 0.7 MG/DL — SIGNIFICANT CHANGE UP (ref 0.2–1.2)
BUN SERPL-MCNC: 11 MG/DL — SIGNIFICANT CHANGE UP (ref 7–23)
CALCIUM SERPL-MCNC: 9.2 MG/DL — SIGNIFICANT CHANGE UP (ref 8.4–10.5)
CHLORIDE SERPL-SCNC: 101 MMOL/L — SIGNIFICANT CHANGE UP (ref 96–108)
CO2 SERPL-SCNC: 26 MMOL/L — SIGNIFICANT CHANGE UP (ref 22–31)
CREAT SERPL-MCNC: 0.75 MG/DL — SIGNIFICANT CHANGE UP (ref 0.5–1.3)
GLUCOSE BLDC GLUCOMTR-MCNC: 179 MG/DL — HIGH (ref 70–99)
GLUCOSE BLDC GLUCOMTR-MCNC: 203 MG/DL — HIGH (ref 70–99)
GLUCOSE BLDC GLUCOMTR-MCNC: 284 MG/DL — HIGH (ref 70–99)
GLUCOSE BLDC GLUCOMTR-MCNC: 346 MG/DL — HIGH (ref 70–99)
GLUCOSE SERPL-MCNC: 394 MG/DL — HIGH (ref 70–99)
HCT VFR BLD CALC: 40.2 % — SIGNIFICANT CHANGE UP (ref 34.5–45)
HGB BLD-MCNC: 13.7 G/DL — SIGNIFICANT CHANGE UP (ref 11.5–15.5)
MCHC RBC-ENTMCNC: 28.5 PG — SIGNIFICANT CHANGE UP (ref 27–34)
MCHC RBC-ENTMCNC: 34.1 GM/DL — SIGNIFICANT CHANGE UP (ref 32–36)
MCV RBC AUTO: 83.8 FL — SIGNIFICANT CHANGE UP (ref 80–100)
NRBC # BLD: 0 /100 WBCS — SIGNIFICANT CHANGE UP (ref 0–0)
PLATELET # BLD AUTO: 224 K/UL — SIGNIFICANT CHANGE UP (ref 150–400)
POTASSIUM SERPL-MCNC: 3.8 MMOL/L — SIGNIFICANT CHANGE UP (ref 3.5–5.3)
POTASSIUM SERPL-SCNC: 3.8 MMOL/L — SIGNIFICANT CHANGE UP (ref 3.5–5.3)
PROT SERPL-MCNC: 7.2 G/DL — SIGNIFICANT CHANGE UP (ref 6–8.3)
RBC # BLD: 4.8 M/UL — SIGNIFICANT CHANGE UP (ref 3.8–5.2)
RBC # FLD: 12.7 % — SIGNIFICANT CHANGE UP (ref 10.3–14.5)
SODIUM SERPL-SCNC: 135 MMOL/L — SIGNIFICANT CHANGE UP (ref 135–145)
WBC # BLD: 5.28 K/UL — SIGNIFICANT CHANGE UP (ref 3.8–10.5)
WBC # FLD AUTO: 5.28 K/UL — SIGNIFICANT CHANGE UP (ref 3.8–10.5)

## 2021-04-15 PROCEDURE — 99232 SBSQ HOSP IP/OBS MODERATE 35: CPT

## 2021-04-15 PROCEDURE — 99233 SBSQ HOSP IP/OBS HIGH 50: CPT

## 2021-04-15 PROCEDURE — 70551 MRI BRAIN STEM W/O DYE: CPT | Mod: 26

## 2021-04-15 RX ORDER — INSULIN GLARGINE 100 [IU]/ML
25 INJECTION, SOLUTION SUBCUTANEOUS AT BEDTIME
Refills: 0 | Status: DISCONTINUED | OUTPATIENT
Start: 2021-04-15 | End: 2021-04-16

## 2021-04-15 RX ORDER — INSULIN LISPRO 100/ML
12 VIAL (ML) SUBCUTANEOUS
Refills: 0 | Status: DISCONTINUED | OUTPATIENT
Start: 2021-04-15 | End: 2021-04-16

## 2021-04-15 RX ORDER — INSULIN LISPRO 100/ML
VIAL (ML) SUBCUTANEOUS
Refills: 0 | Status: DISCONTINUED | OUTPATIENT
Start: 2021-04-15 | End: 2021-04-21

## 2021-04-15 RX ADMIN — Medication 2 GRAM(S): at 17:07

## 2021-04-15 RX ADMIN — Medication 12 UNIT(S): at 11:26

## 2021-04-15 RX ADMIN — LISINOPRIL 10 MILLIGRAM(S): 2.5 TABLET ORAL at 05:51

## 2021-04-15 RX ADMIN — ENOXAPARIN SODIUM 40 MILLIGRAM(S): 100 INJECTION SUBCUTANEOUS at 11:15

## 2021-04-15 RX ADMIN — Medication 6: at 11:26

## 2021-04-15 RX ADMIN — PANTOPRAZOLE SODIUM 40 MILLIGRAM(S): 20 TABLET, DELAYED RELEASE ORAL at 05:51

## 2021-04-15 RX ADMIN — ATORVASTATIN CALCIUM 80 MILLIGRAM(S): 80 TABLET, FILM COATED ORAL at 21:53

## 2021-04-15 RX ADMIN — Medication 81 MILLIGRAM(S): at 11:14

## 2021-04-15 RX ADMIN — CLOPIDOGREL BISULFATE 75 MILLIGRAM(S): 75 TABLET, FILM COATED ORAL at 11:14

## 2021-04-15 RX ADMIN — Medication 2 GRAM(S): at 05:51

## 2021-04-15 RX ADMIN — INSULIN GLARGINE 25 UNIT(S): 100 INJECTION, SOLUTION SUBCUTANEOUS at 21:52

## 2021-04-15 RX ADMIN — Medication 4: at 08:10

## 2021-04-15 RX ADMIN — Medication 2: at 17:31

## 2021-04-15 RX ADMIN — Medication 8 UNIT(S): at 08:10

## 2021-04-15 RX ADMIN — Medication 12 UNIT(S): at 17:07

## 2021-04-15 RX ADMIN — Medication 650 MILLIGRAM(S): at 15:03

## 2021-04-15 RX ADMIN — Medication 650 MILLIGRAM(S): at 14:06

## 2021-04-15 NOTE — OCCUPATIONAL THERAPY INITIAL EVALUATION ADULT - GROSSLY INTACT, SENSORY
--Pt with ability to feel light touch in right UE and LE however, reports pins/needles and hypersensitivity throughout the right extremities present./Left UE/Left LE/Grossly Intact

## 2021-04-15 NOTE — OCCUPATIONAL THERAPY INITIAL EVALUATION ADULT - GENERAL OBSERVATIONS, REHAB EVAL
Pt received supine in bed +PIV and cardiac monitor. Alert and oriented x4, Vitals stable throughout session. Pt able to following multistep commands % of time. Pt left supine in bed with lines intact, call bell in reach, and all needs met.

## 2021-04-15 NOTE — DIETITIAN INITIAL EVALUATION ADULT. - EDUCATION DIETARY MODIFICATIONS
Consistent carbohydrate, Heart Healthy, low Na, weight management/teach back/(2) meets goals/outcomes/verbalization

## 2021-04-15 NOTE — OCCUPATIONAL THERAPY INITIAL EVALUATION ADULT - MUSCLE TONE ASSESSMENT, REHAB EVAL
possible increase in tone in distal right UE-formal testing unable to be completed due to pain to touch

## 2021-04-15 NOTE — DIETITIAN INITIAL EVALUATION ADULT. - OTHER INFO
65 female non compliant t2dm, htn, hld, presented with right sided numbness uncontrolled htn and hyperglycemia.    Pt tolerating current diet with report of good appetite. Consuming >75% of meals. Reports UBW 200lbs, states that she has been gaining weight as of late 2/2 overeating. Denies chewing/swallowing difficulty. No evidence of dysphagia post stroke. Pt states that she was not taking any medications for DMII management, does not have a glucometer at home. Pt is motivated to change and implement necessary lifestyle changes in order to optimize management of DMII and HTN. Educated pt on consistent carbohydrate diet, increased non-starchy vegetable intake, carbohydrate portion recommendations, meal balance, avoidance of concentrated sugar. hypoglycemia protocol, SMBG + glycemic goal. Educated on heart healthy, low sodium diet recommendations. Reviewed calorie/carbohydrate targets with goals for gradual weight loss towards IBW. Handouts provided. Pt receptive to education, all questions answered. Recommended follow up with outpatient RD/CDE for continued compliance/support.

## 2021-04-15 NOTE — OCCUPATIONAL THERAPY INITIAL EVALUATION ADULT - ADDITIONAL COMMENTS
As per pt, pt lives in a private split ranch house with 10 steps with 1 HR to front door and 8 steps up/down with 1 handrail inside. Pt reports having a tub shower at home. Pt reports having no DME and was independent in all ADLs/IADLs prior to hospitalization. Pt reports family is not home during the day to assist in ADLs

## 2021-04-15 NOTE — PROGRESS NOTE ADULT - SUBJECTIVE AND OBJECTIVE BOX
Patient is a 65 year old woman admitted yesterday, 4/13/21 with right face, arm, and leg numbness and tingling. She states yesterday morning noted the numbness and tingling suddenly involving the right face, arm, and leg which has persisted. She also notes HA which is generalized. She also notes lightheadedness on standing. She denies other neurological complaints. She denies fever, chest pain, trauma. Today, Thursday, she states persists with right face, arm, and leg numbness and tingling.     PMH: DM- Dr. Marie Dunne          HTN    SH No allergy    Exam :Awake, alert, appropriate           Pupils 2.5mm, EOM intact, no nystagmus, VFF           CN II-XII intact           Motor tone and strength normal           Gait slow hesitant           Sensation decreased right face, arm, and leg                          13.0   5.32  )-----------( 233      ( 14 Apr 2021 05:45 )             39.0       04-14    136  |  100  |  9   ----------------------------<  366<H>  4.1   |  26  |  0.68    Ca    9.0      14 Apr 2021 05:45    TPro  7.4  /  Alb  3.3  /  TBili  0.3  /  DBili  x   /  AST  29  /  ALT  28  /  AlkPhos  95  04-14    Lipid Profile in AM (04.14.21 @ 05:45)    Cholesterol, Serum: 206 mg/dL    Triglycerides, Serum: 195 mg/dL    HDL Cholesterol, Serum: 37 mg/dL    Non HDL Cholesterol: 169: Patient’s Atherosclerotic Cardiovascular Disease (ASCVD) Risk  Optimal Level (mg/dL)  LDL Cholesterol (LDL-C)  All Patients                                < 100  ASCVD at Very High Risk1    < 70  Non-HDL Cholesterol (Non-HDL-C)  All Patients                        < 130  ASCVD at Very High Risk1   < 100  Non-HDL-Cholesterol (Non-HDL-C) is also a key target for cardiovascular  risk reduction.  Consider Familial Hypercholesterolemia when: LDL-C > 190 mg/dL or  Non-HDL-C > 220 mg/dL.  LDL-C calculation using the Friedewald equation is not provided when  triglycerides > 400 mg/dL, in which case we recommend repeating the test  after fasting, if it was not done before.  When triglycerides >150 mg/dL, calculated LDL-C is provided but may still  be inaccurate (particularly when LDL-C < 70 mg/dL). It can be  recalculated off-line using other equations (e.g. Nahun SS, Leilani SEXTON,  Irish YOUNG, et al.DEE 2013;310:2061 - 8).  1 Bridger Bernstein,et al. "2019 AHA/ACC. . . guideline on the  management of blood cholesterol: a report of the American College of  Cardiology/American Heart Association Task Force on Clinical Practice  Guidelines." Circulation;139:e1082 - e1143.  These values apply only to persons 20 years and older.  Lipid Panel updated with new test, reference ranges and interpretive  comments effective 10-. mg/dL    LDL Cholesterol Calculated: 130 mg/dL    < from: TTE Echo Complete w/o Contrast w/ Doppler (04.14.21 @ 08:52) >    Summary:   1. Left ventricular ejection fraction, by visual estimation, is 60 to 65%.   2. Normal global left ventricular systolic function.   3. Normal left ventricular internal cavity size.   4. Spectral Doppler shows impaired relaxation pattern ofleft ventricular myocardial filling (Grade I diastolic dysfunction).   5. There is mild asymmetric left ventricular hypertrophy.   6. Normal right ventricular size and function.   7. The left atrium is normal in size.   8. Thickening of the anterior and posterior mitral valve leaflets.   9. Trace mitral valve regurgitation.  10. Mild tricuspid regurgitation.  11. Sclerotic aortic valve with normal opening.  12. LA volume Index is 20.7 ml/m² ml/m              < from: CT Brain Stroke Protocol (04.13.21 @ 19:01) >  REBEKAHSON EXAMINATION: None.    FINDINGS:  HEAD CT:  VENTRICLES AND SULCI: Ventricles and sulci are unremarkable for patient age.  INTRA-AXIAL: No intracranial mass, acute hemorrhage, or midline shift is present.There is non-specific decreased attenuation in the white matter likely microvascular disease.  EXTRA-AXIAL: No extra-axial fluid collection is present.  INTRACRANIAL HEMORRHAGE: None.    VISUALIZED SINUSES: No air-fluid levels are identified.  VISUALIZED MASTOIDS:  Clear.  CALVARIUM:  Intact.  MISCELLANEOUS:None.    SOFT TISSUES: Unremarkable.  BONES: Unremarkable.      IMPRESSION:  HEAD CT: Mild volume loss, microvascular disease, no acute hemorrhage or midline shift.    Discussed with Dr. Barakat in the ED at 6:44 PM.          < from: CT Perfusion w/ Maps w/ IV Cont (04.13.21 @ 19:02) >  PARISON EXAMINATION: None.    FINDINGS:    CT RAPID PERFUSION:  CBF<30% volume: 0 ml  Tmax>6.0 s volume: 0 ml  Mismatch volume: 0 ml  Mismatch ratio: none    CORE INFARCT: None.  TISSUE AT RISK: None.  MISMATCH RATIO: None.      CTA [place] OF [place]:  ANTERIOR CIRCULATION  ICA  CAVERNOUS, SUPRACLINOID, BIFURCATION SEGMENTS: Patent without flow limiting stenosis.    ANTERIOR CEREBRAL ARTERIES: Bilateral A1, anterior communicating and A2 anterior cerebral arteries are unremarkable in course and caliber without flow limiting stenosis.    MIDDLE CEREBRAL ARTERIES: Patent bilateral M1 branches without flow limiting stenosis.  There are severe focal stenoses bilaterally in the proximal M2 branches of the MCA.    POSTERIOR CIRCULATION:  VERTEBRAL ARTERIES: Patent without flow limiting stenosis.  BASILAR ARTERY: Patent no flow limiting stenosis.      POSTERIOR CEREBRAL ARTERIES: Patent without flow limiting stenosis. Severe focal stenosis in the mid right PCA and diffuse long segment luminal irregularity in the leftPCA.      CTA NECK:  GREAT VESSELS: Visualized segments are patent, no flow limiting stenosis.    COMMON CAROTID ARTERIES:  RIGHT CCA: Patent without flow limiting stenosis  LEFT CCA: Patent without flow limiting stenosis    CAROTID BULBS:  RIGHT CB:Patent without flow limiting stenosis  LEFT CB: Patent without flow limiting stenosis    INTERNAL CAROTID ARTERIES:  RIGHT ICA: Patent no evidence for any hemodynamically significant stenosis at the ICA origin by NASCET criteria.  LEFT ICA: Patent noevidence for any hemodynamically significant stenosis at the ICA origin by NASCET criteria.    VERTEBRAL ARTERIES:  RIGHT VA: Patent no evidence for any flow limiting stenosis.  LEFT VA: Patent no evidence for any flow limiting stenosis.      SOFT TISSUES: Unremarkable  BONES: Unremarkable        < from: MR Head No Cont (04.15.21 @ 13:39) >    COMPARISON: 7/23/2018 MRI brain, CT head dated/13/2020    TECHNIQUE: MRI brain: Multiplanar, multisequence MR imaging of thebrain are obtained without the administration of intravenous Gadavist contrast.    FINDINGS:  There is punctate focus of abnormal restricted diffusion in the left aspect of the mid to lower on image 8 of series 8.    Scattered periventricular and subcortical white matter T2 /FLAIR hyperintensities are seen without mass effect, nonspecific, likely representing minimal chronic microvascular changes.    Normal T2 flow-voids are seen within  the intracranial vasculature. The lateral ventricles and cortical sulci are age-appropriate in size and configuration. There is no mass, mass effect, or extra-axial fluid collection. There is no susceptibility artifact to suggest hemorrhage. Midline structures are normal.  The visualized paranasal sinuses, mastoid air cells and orbits are unremarkable.    Bilateral screening scalp nodular polypoid likely cutaneous lesions are redemonstrated. Direct visual inspection is recommended. Correlate clinically.    IMPRESSION: Acute tiny left lateral medullary infarct. Correlate clinically.                  IMPRESSION:    CT PERFUSION demonstrated: No core infarct. No active ischemia, despite multiple focal severe stenoses by CTA.  If symptoms persist consider follow up head CT or MRI, MRA  if no contraindication.    CTA COW:  Multiple bilateral severe focal stenoses noted in proximal M2 branches of both MCAs and bilaterally in the PCAs.    CTA NECK: Patent, ECAs, ICAs, no  hemodynamically significant stenosis at  ICA origins by NASCET criteria.  Bilateral vertebral arteries are patent without flow limiting stenosis.     Discussed with Dr. Barakat in the ED at 7:04 PM. MRI may be helpful for further evaluation, if clinically indicated.

## 2021-04-15 NOTE — OCCUPATIONAL THERAPY INITIAL EVALUATION ADULT - PLANNED THERAPY INTERVENTIONS, OT EVAL
ADL retraining/IADL retraining/balance training/bed mobility training/fine motor coordination training/joint mobilization/neuromuscular re-education/strengthening/transfer training

## 2021-04-15 NOTE — DIETITIAN INITIAL EVALUATION ADULT. - ORAL INTAKE PTA/DIET HISTORY
Pt reports consuming a regular, unrestricted diet PTA. Admits to emotional eating- reporting that she can eat a "whole sheet of cake followed by a pint of ice cream". Reports adding salt to foods PTA as she thought it would help with her leg cramping. Denies food allergies/intolerances. 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

## 2021-04-15 NOTE — PROGRESS NOTE ADULT - SUBJECTIVE AND OBJECTIVE BOX
Follow up for stroke  SUBJ:    comfortable    PMH  HTN (hypertension)    HLD (hyperlipidemia)    Diabetes mellitus        MEDICATIONS  (STANDING):  aspirin enteric coated 81 milliGRAM(s) Oral daily  atorvastatin 80 milliGRAM(s) Oral at bedtime  clopidogrel Tablet 75 milliGRAM(s) Oral daily  dextrose 40% Gel 15 Gram(s) Oral once  dextrose 5%. 1000 milliLiter(s) (50 mL/Hr) IV Continuous <Continuous>  dextrose 5%. 1000 milliLiter(s) (100 mL/Hr) IV Continuous <Continuous>  dextrose 50% Injectable 25 Gram(s) IV Push once  dextrose 50% Injectable 12.5 Gram(s) IV Push once  dextrose 50% Injectable 25 Gram(s) IV Push once  enoxaparin Injectable 40 milliGRAM(s) SubCutaneous daily  glucagon  Injectable 1 milliGRAM(s) IntraMuscular once  insulin glargine Injectable (LANTUS) 25 Unit(s) SubCutaneous at bedtime  insulin lispro (ADMELOG) corrective regimen sliding scale   SubCutaneous three times a day before meals  insulin lispro (ADMELOG) corrective regimen sliding scale   SubCutaneous at bedtime  insulin lispro Injectable (ADMELOG) 12 Unit(s) SubCutaneous three times a day before meals  lisinopril 10 milliGRAM(s) Oral daily  omega-3-Acid Ethyl Esters 2 Gram(s) Oral two times a day  pantoprazole    Tablet 40 milliGRAM(s) Oral before breakfast    MEDICATIONS  (PRN):  acetaminophen   Tablet .. 650 milliGRAM(s) Oral every 6 hours PRN Temp greater or equal to 38C (100.4F), Mild Pain (1 - 3)        PHYSICAL EXAM:  Vital Signs Last 24 Hrs  T(C): 36.9 (15 Apr 2021 16:30), Max: 37 (14 Apr 2021 23:59)  T(F): 98.4 (15 Apr 2021 16:30), Max: 98.6 (14 Apr 2021 23:59)  HR: 85 (15 Apr 2021 16:30) (84 - 89)  BP: 145/65 (15 Apr 2021 16:30) (145/65 - 175/80)  BP(mean): --  RR: 18 (15 Apr 2021 16:30) (17 - 18)  SpO2: 97% (15 Apr 2021 16:30) (95% - 98%)    GENERAL: NAD, well-groomed, well-developed  HEAD:  Atraumatic, Normocephalic  EYES: EOMI, PERRLA, conjunctiva and sclera clear  ENT: Moist mucous membranes,  NECK: Supple, No JVD, no bruits  CHEST/LUNG: Clear to percussion bilaterally; No rales, rhonchi, wheezing, or rubs  HEART: Regular rate and rhythm; No murmurs, rubs, or gallops PMI non displaced.  ABDOMEN: Soft, Nontender, Nondistended; Bowel sounds present  EXTREMITIES:  2+ Peripheral Pulses, No clubbing, cyanosis, or edema  SKIN: No rashes or lesions  NERVOUS SYSTEM:  Cranial Nerves II-XII intact      TELEMETRY:      RSR  ECG:    < from: 12 Lead ECG (04.13.21 @ 19:36) >    Diagnosis Line Normal sinus rhythm  Minimal voltage criteria for LVH, may be normal variant  Nonspecific T wave abnormality  Abnormal ECG  No previous ECGs available  Confirmed by MASOOD CANELA, CAMILO IYER ([id number]) on 4/14/2021 8:33:34 AM    < end of copied text >    ECHO:    < from: TTE Echo Complete w/o Contrast w/ Doppler (04.14.21 @ 08:52) >    Summary:   1. Left ventricular ejection fraction, by visual estimation, is 60 to 65%.   2. Normal global left ventricular systolic function.   3. Normal left ventricular internal cavity size.   4. Spectral Doppler shows impaired relaxation pattern ofleft ventricular myocardial filling (Grade I diastolic dysfunction).   5. There is mild asymmetric left ventricular hypertrophy.   6. Normal right ventricular size and function.   7. The left atrium is normal in size.   8. Thickening of the anterior and posterior mitral valve leaflets.   9. Trace mitral valve regurgitation.  10. Mild tricuspid regurgitation.  11. Sclerotic aortic valve with normal opening.  12. LA volume Index is 20.7 ml/m² ml/m2.    [id number] Camilo Strickland MD, [facility] , Electronically signed on 4/14/2021 at 2:12:45 PM            *** Final ***        CAMILO STRICKLAND M.D., ATTENDING CARDIOLOGIST    < end of copied text >      LABS:                        13.7   5.28  )-----------( 224      ( 15 Apr 2021 10:31 )             40.2     04-15    135  |  101  |  11  ----------------------------<  394<H>  3.8   |  26  |  0.75    Ca    9.2      15 Apr 2021 10:31    TPro  7.2  /  Alb  3.2<L>  /  TBili  0.7  /  DBili  x   /  AST  32  /  ALT  38  /  AlkPhos  70  04-15            I&O's Summary    14 Apr 2021 07:01  -  15 Apr 2021 07:00  --------------------------------------------------------  IN: 240 mL / OUT: 0 mL / NET: 240 mL    15 Apr 2021 07:01  -  15 Apr 2021 22:36  --------------------------------------------------------  IN: 400 mL / OUT: 0 mL / NET: 400 mL      BNP    RADIOLOGY & ADDITIONAL STUDIES:    ECHO:      impression  multiple large vessel occlusions would rule out cardiac source of embolization need for long-term monitoring such as a 30 day or even an internal loop recorder was recommended and a transesophageal echo as warranted. Await recommendations with respect to rehabilitation. antiplatelet's for peripheral arterial disease as per neurology

## 2021-04-15 NOTE — OCCUPATIONAL THERAPY INITIAL EVALUATION ADULT - MD ORDER
MD order received. Chart reviewed and ELANA Leigh cleared for OT. Initial evaluation complete-refer to long sheet for findings.

## 2021-04-15 NOTE — PROGRESS NOTE ADULT - SUBJECTIVE AND OBJECTIVE BOX
Patient is a 65y old  Female who presents with a chief complaint of R sided numbness (14 Apr 2021 15:11)    Patient seen and examined at bedside.  no acute events overnight, pt reports feeling well    ALLERGIES:  No Known Allergies        Vital Signs Last 24 Hrs  T(F): 97.6 (15 Apr 2021 05:39), Max: 98.7 (14 Apr 2021 20:43)  HR: 85 (15 Apr 2021 05:39) (85 - 91)  BP: 146/84 (15 Apr 2021 05:39) (146/84 - 176/84)  RR: 17 (15 Apr 2021 05:39) (16 - 18)  SpO2: 95% (15 Apr 2021 05:39) (95% - 98%)  I&O's Summary    14 Apr 2021 07:01  -  15 Apr 2021 07:00  --------------------------------------------------------  IN: 240 mL / OUT: 0 mL / NET: 240 mL      MEDICATIONS:  acetaminophen   Tablet .. 650 milliGRAM(s) Oral every 6 hours PRN  aspirin enteric coated 81 milliGRAM(s) Oral daily  atorvastatin 80 milliGRAM(s) Oral at bedtime  clopidogrel Tablet 75 milliGRAM(s) Oral daily  dextrose 40% Gel 15 Gram(s) Oral once  dextrose 5%. 1000 milliLiter(s) IV Continuous <Continuous>  dextrose 5%. 1000 milliLiter(s) IV Continuous <Continuous>  dextrose 50% Injectable 25 Gram(s) IV Push once  dextrose 50% Injectable 12.5 Gram(s) IV Push once  dextrose 50% Injectable 25 Gram(s) IV Push once  enoxaparin Injectable 40 milliGRAM(s) SubCutaneous daily  glucagon  Injectable 1 milliGRAM(s) IntraMuscular once  insulin glargine Injectable (LANTUS) 25 Unit(s) SubCutaneous at bedtime  insulin lispro (ADMELOG) corrective regimen sliding scale   SubCutaneous three times a day before meals  insulin lispro (ADMELOG) corrective regimen sliding scale   SubCutaneous at bedtime  insulin lispro Injectable (ADMELOG) 12 Unit(s) SubCutaneous three times a day before meals  lisinopril 10 milliGRAM(s) Oral daily  omega-3-Acid Ethyl Esters 2 Gram(s) Oral two times a day  pantoprazole    Tablet 40 milliGRAM(s) Oral before breakfast      PHYSICAL EXAM:  General: NAD, A/O x 3  ENT: MMM, no thrush  Neck: Supple, No JVD  Lungs: Clear to auscultation bilaterally, non labored breathing  Cardio: S1S2 regualr  Abdomen: Soft, Nontender  Extremities: No cyanosis, No edema    LABS:                        13.0   5.32  )-----------( 233      ( 14 Apr 2021 05:45 )             39.0     04-14    136  |  100  |  9   ----------------------------<  366  4.1   |  26  |  0.68    Ca    9.0      14 Apr 2021 05:45    TPro  7.4  /  Alb  3.3  /  TBili  0.3  /  DBili  x   /  AST  29  /  ALT  28  /  AlkPhos  95  04-14    eGFR if Non : 93 mL/min/1.73M2 (04-14-21 @ 05:45)  eGFR if : 108 mL/min/1.73M2 (04-14-21 @ 05:45)    PT/INR - ( 13 Apr 2021 18:48 )   PT: 12.4 sec;   INR: 1.03 ratio         PTT - ( 13 Apr 2021 18:48 )  PTT:34.8 sec    CARDIAC MARKERS ( 13 Apr 2021 18:48 )  <.017 ng/mL / x     / x     / x     / x          04-14 Chol 206 mg/dL LDL -- HDL 37 mg/dL Trig 195 mg/dL  TSH 1.85   TSH with FT4 reflex --  Total T3 --    18:45 - VBG - pH: 7.38  | pCO2: 53    | pO2: 32    | Lactate:                  POCT Blood Glucose.: 346 mg/dL (15 Apr 2021 08:06)  POCT Blood Glucose.: 230 mg/dL (14 Apr 2021 20:42)  POCT Blood Glucose.: 303 mg/dL (14 Apr 2021 16:53)  POCT Blood Glucose.: 290 mg/dL (14 Apr 2021 11:51)            RADIOLOGY & ADDITIONAL TESTS:    Care Discussed with Consultants/Other Providers:    Patient is a 65y old  Female who presents with a chief complaint of R sided numbness (14 Apr 2021 15:11)    Patient seen and examined at bedside.  no acute events overnight. Reports continued R sided numbness. Tolerating breakfast.     ALLERGIES:  No Known Allergies        Vital Signs Last 24 Hrs  T(F): 97.6 (15 Apr 2021 05:39), Max: 98.7 (14 Apr 2021 20:43)  HR: 85 (15 Apr 2021 05:39) (85 - 91)  BP: 146/84 (15 Apr 2021 05:39) (146/84 - 176/84)  RR: 17 (15 Apr 2021 05:39) (16 - 18)  SpO2: 95% (15 Apr 2021 05:39) (95% - 98%)  I&O's Summary    14 Apr 2021 07:01  -  15 Apr 2021 07:00  --------------------------------------------------------  IN: 240 mL / OUT: 0 mL / NET: 240 mL      MEDICATIONS:  acetaminophen   Tablet .. 650 milliGRAM(s) Oral every 6 hours PRN  aspirin enteric coated 81 milliGRAM(s) Oral daily  atorvastatin 80 milliGRAM(s) Oral at bedtime  clopidogrel Tablet 75 milliGRAM(s) Oral daily  dextrose 40% Gel 15 Gram(s) Oral once  dextrose 5%. 1000 milliLiter(s) IV Continuous <Continuous>  dextrose 5%. 1000 milliLiter(s) IV Continuous <Continuous>  dextrose 50% Injectable 25 Gram(s) IV Push once  dextrose 50% Injectable 12.5 Gram(s) IV Push once  dextrose 50% Injectable 25 Gram(s) IV Push once  enoxaparin Injectable 40 milliGRAM(s) SubCutaneous daily  glucagon  Injectable 1 milliGRAM(s) IntraMuscular once  insulin glargine Injectable (LANTUS) 25 Unit(s) SubCutaneous at bedtime  insulin lispro (ADMELOG) corrective regimen sliding scale   SubCutaneous three times a day before meals  insulin lispro (ADMELOG) corrective regimen sliding scale   SubCutaneous at bedtime  insulin lispro Injectable (ADMELOG) 12 Unit(s) SubCutaneous three times a day before meals  lisinopril 10 milliGRAM(s) Oral daily  omega-3-Acid Ethyl Esters 2 Gram(s) Oral two times a day  pantoprazole    Tablet 40 milliGRAM(s) Oral before breakfast      PHYSICAL EXAM:  General: NAD, A/O x 3  ENT: MMM, no thrush  Neck: Supple, No JVD  Lungs: Clear to auscultation bilaterally, non labored breathing  Cardio: S1S2 regular  Abdomen: Soft, Nontender  Extremities: No cyanosis, No edema  Neuro: moves all extremities, decreased sensation in R face, R arm, R leg    LABS:                        13.0   5.32  )-----------( 233      ( 14 Apr 2021 05:45 )             39.0     04-14    136  |  100  |  9   ----------------------------<  366  4.1   |  26  |  0.68    Ca    9.0      14 Apr 2021 05:45    TPro  7.4  /  Alb  3.3  /  TBili  0.3  /  DBili  x   /  AST  29  /  ALT  28  /  AlkPhos  95  04-14    eGFR if Non : 93 mL/min/1.73M2 (04-14-21 @ 05:45)  eGFR if : 108 mL/min/1.73M2 (04-14-21 @ 05:45)    PT/INR - ( 13 Apr 2021 18:48 )   PT: 12.4 sec;   INR: 1.03 ratio         PTT - ( 13 Apr 2021 18:48 )  PTT:34.8 sec    CARDIAC MARKERS ( 13 Apr 2021 18:48 )  <.017 ng/mL / x     / x     / x     / x          04-14 Chol 206 mg/dL LDL -- HDL 37 mg/dL Trig 195 mg/dL  TSH 1.85   TSH with FT4 reflex --  Total T3 --    18:45 - VBG - pH: 7.38  | pCO2: 53    | pO2: 32    | Lactate:                  POCT Blood Glucose.: 346 mg/dL (15 Apr 2021 08:06)  POCT Blood Glucose.: 230 mg/dL (14 Apr 2021 20:42)  POCT Blood Glucose.: 303 mg/dL (14 Apr 2021 16:53)  POCT Blood Glucose.: 290 mg/dL (14 Apr 2021 11:51)            RADIOLOGY & ADDITIONAL TESTS:    Care Discussed with Consultants/Other Providers: Neuro Dr. Owen, Cardio Dr. Molina

## 2021-04-15 NOTE — PROGRESS NOTE ADULT - ASSESSMENT
Patient is a 65 year old woman admitted yesterday, 4/13/21 with right face, arm, and leg numbness and tingling. She states yesterday morning noted the numbness and tingling suddenly involving the right face, arm, and leg which has persisted. She also notes HA which is generalized. She also notes lightheadedness on standing. She denies other neurological complaints. She denies fever, chest pain, trauma. Neurological exam as above. Would be concerned with left cerebral ischemia secondary to small vessel disease.    1) CT Head as above no hemorrhage. Microvascular ischemic disease.  2) CTA Head and Neck  as above bilateral M1 branches without flow limiting stenoses. There are severe focal stenoses bilaterally proximal M2 branches MCA                                                 PCA without flow limiting stenoses, severe focal mid right PCA stenosis, and irregularity left PCA                                                 No significant carotid stenosis  3) CT Perfusion as above no core infarct, no active ischemia.  4) With evidence of large vessel disease would place on ASA 81 mg and Plavix 75 mg for 3 weeks and then continue ASA 81 mg alone.  5) MRI Head as above acute left lateral medullary infarct.  6)  Echo .as above no thrombus.  7)  Cardiology Consult With evidence of multiple stenoses of large vessels as above would consider further evaluation with regard to possible contributing embolic disease including LON and extended cardiac monitoring.       Appreciate Cardiology consult. Consideration of LON and extended cardiac monitoring.

## 2021-04-15 NOTE — DIETITIAN INITIAL EVALUATION ADULT. - ADD RECOMMEND
Ongoing diet education throughout hospitalization. Suggest follow up with outpatient RD/CDE for ongoing support. Will provide protein based snack every evening.

## 2021-04-15 NOTE — PROGRESS NOTE ADULT - ASSESSMENT
65 female non compliant t2dm, htn, hld, presented with right sided numbness uncontrolled htn and hyperglycemia.    # CVA with RT sided paresthesias  s/p code stoke in ED  CTH unremarkable  CTA revealed multiple B/L severe focal stenoses prox M2 branch and both MCAs B/L in PCA  lipid profile reviewed, echo reviewed  neuro consult appreciated  MRI pending  continue high dose statin, ASA and Plavix for three weeks (started 4/14/21) then just ASA monotherapy  neuro checks, permissive htn 24 hours  Cardio consulted recommendations appreciated - TTE reviewed. Continue tele monitoring, may need long term monitor v LON    # T2DM with hyperglycemia uncontrolled  A1c 13.9  Lantus 25 units qHS, Admelog 12 units pre-meal, low dose insulin sliding scale monitor BS, continue to titrate as needed    # HTN  chronic condition  Increase lisinopril 10 mg daily   monitor BP carefully, continue to titrate as needed    # HLD  chronic condition  chol 221 tri 286 hdl 39 ldl 125  maintain high dose statin therapy - lipitor 80 daily, start Loveza     # Prophylactic Measure  protonix, lovenox    attempted to call patient's spouse mira richmond [phone] no answer and no option to leave voice message 65 female non compliant t2dm, htn, hld, presented with right sided numbness uncontrolled htn and hyperglycemia.    #CVA with RT sided paresthesias  CTH unremarkable  CTA revealed multiple B/L severe focal stenoses prox M2 branch and both MCAs B/L in PCA  lipid profile reviewed, echo reviewed  neuro consult appreciated  MRI pending  continue high dose statin, ASA and Plavix for three weeks (started 4/14/21) then just ASA monotherapy  neuro checks, permissive htn 24 hours  Cardio consulted recommendations appreciated - TTE reviewed. Continue tele monitoring, may need long term monitor v LON    # T2DM with hyperglycemia uncontrolled  A1c 13.9  Lantus 25 units qHS, Admelog 12 units pre-meal, low dose insulin sliding scale monitor BS, continue to titrate as needed    # HTN  chronic condition  Increase lisinopril 10 mg daily   monitor BP carefully, continue to titrate as needed    # HLD  chronic condition  chol 221 tri 286 hdl 39 ldl 125  Continue Lipitor, Loveza    # Prophylactic Measure  protonix, lovenox  PT/OT/PMR consult    attempted to call patient's spouse mira richmond [phone] no answer and no option to leave voice message

## 2021-04-16 ENCOUNTER — TRANSCRIPTION ENCOUNTER (OUTPATIENT)
Age: 66
End: 2021-04-16

## 2021-04-16 LAB
ANION GAP SERPL CALC-SCNC: 8 MMOL/L — SIGNIFICANT CHANGE UP (ref 5–17)
BUN SERPL-MCNC: 16 MG/DL — SIGNIFICANT CHANGE UP (ref 7–23)
CALCIUM SERPL-MCNC: 9.3 MG/DL — SIGNIFICANT CHANGE UP (ref 8.4–10.5)
CHLORIDE SERPL-SCNC: 100 MMOL/L — SIGNIFICANT CHANGE UP (ref 96–108)
CO2 SERPL-SCNC: 28 MMOL/L — SIGNIFICANT CHANGE UP (ref 22–31)
CREAT SERPL-MCNC: 0.76 MG/DL — SIGNIFICANT CHANGE UP (ref 0.5–1.3)
GLUCOSE BLDC GLUCOMTR-MCNC: 182 MG/DL — HIGH (ref 70–99)
GLUCOSE BLDC GLUCOMTR-MCNC: 208 MG/DL — HIGH (ref 70–99)
GLUCOSE BLDC GLUCOMTR-MCNC: 280 MG/DL — HIGH (ref 70–99)
GLUCOSE BLDC GLUCOMTR-MCNC: 331 MG/DL — HIGH (ref 70–99)
GLUCOSE SERPL-MCNC: 313 MG/DL — HIGH (ref 70–99)
HCT VFR BLD CALC: 43.5 % — SIGNIFICANT CHANGE UP (ref 34.5–45)
HGB BLD-MCNC: 14.3 G/DL — SIGNIFICANT CHANGE UP (ref 11.5–15.5)
MCHC RBC-ENTMCNC: 28.1 PG — SIGNIFICANT CHANGE UP (ref 27–34)
MCHC RBC-ENTMCNC: 32.9 GM/DL — SIGNIFICANT CHANGE UP (ref 32–36)
MCV RBC AUTO: 85.5 FL — SIGNIFICANT CHANGE UP (ref 80–100)
NRBC # BLD: 0 /100 WBCS — SIGNIFICANT CHANGE UP (ref 0–0)
PLATELET # BLD AUTO: 229 K/UL — SIGNIFICANT CHANGE UP (ref 150–400)
POTASSIUM SERPL-MCNC: 4.6 MMOL/L — SIGNIFICANT CHANGE UP (ref 3.5–5.3)
POTASSIUM SERPL-SCNC: 4.6 MMOL/L — SIGNIFICANT CHANGE UP (ref 3.5–5.3)
RBC # BLD: 5.09 M/UL — SIGNIFICANT CHANGE UP (ref 3.8–5.2)
RBC # FLD: 12.7 % — SIGNIFICANT CHANGE UP (ref 10.3–14.5)
SODIUM SERPL-SCNC: 136 MMOL/L — SIGNIFICANT CHANGE UP (ref 135–145)
WBC # BLD: 5.03 K/UL — SIGNIFICANT CHANGE UP (ref 3.8–10.5)
WBC # FLD AUTO: 5.03 K/UL — SIGNIFICANT CHANGE UP (ref 3.8–10.5)

## 2021-04-16 PROCEDURE — 99232 SBSQ HOSP IP/OBS MODERATE 35: CPT

## 2021-04-16 PROCEDURE — 99222 1ST HOSP IP/OBS MODERATE 55: CPT | Mod: GC

## 2021-04-16 PROCEDURE — 99233 SBSQ HOSP IP/OBS HIGH 50: CPT

## 2021-04-16 RX ORDER — INSULIN LISPRO 100/ML
15 VIAL (ML) SUBCUTANEOUS
Refills: 0 | Status: DISCONTINUED | OUTPATIENT
Start: 2021-04-16 | End: 2021-04-21

## 2021-04-16 RX ORDER — ASPIRIN/CALCIUM CARB/MAGNESIUM 324 MG
1 TABLET ORAL
Qty: 0 | Refills: 0 | DISCHARGE
Start: 2021-04-16

## 2021-04-16 RX ORDER — INSULIN GLARGINE 100 [IU]/ML
30 INJECTION, SOLUTION SUBCUTANEOUS
Qty: 0 | Refills: 0 | DISCHARGE
Start: 2021-04-16

## 2021-04-16 RX ORDER — LISINOPRIL 2.5 MG/1
20 TABLET ORAL DAILY
Refills: 0 | Status: DISCONTINUED | OUTPATIENT
Start: 2021-04-17 | End: 2021-04-17

## 2021-04-16 RX ORDER — LISINOPRIL 2.5 MG/1
1 TABLET ORAL
Qty: 0 | Refills: 0 | DISCHARGE
Start: 2021-04-16

## 2021-04-16 RX ORDER — CLOPIDOGREL BISULFATE 75 MG/1
1 TABLET, FILM COATED ORAL
Qty: 0 | Refills: 0 | DISCHARGE
Start: 2021-04-16

## 2021-04-16 RX ORDER — INSULIN GLARGINE 100 [IU]/ML
30 INJECTION, SOLUTION SUBCUTANEOUS AT BEDTIME
Refills: 0 | Status: DISCONTINUED | OUTPATIENT
Start: 2021-04-16 | End: 2021-04-17

## 2021-04-16 RX ORDER — ATORVASTATIN CALCIUM 80 MG/1
1 TABLET, FILM COATED ORAL
Qty: 0 | Refills: 0 | DISCHARGE
Start: 2021-04-16

## 2021-04-16 RX ORDER — GABAPENTIN 400 MG/1
300 CAPSULE ORAL AT BEDTIME
Refills: 0 | Status: DISCONTINUED | OUTPATIENT
Start: 2021-04-16 | End: 2021-04-17

## 2021-04-16 RX ORDER — OMEGA-3 ACID ETHYL ESTERS 1 G
2 CAPSULE ORAL
Qty: 0 | Refills: 0 | DISCHARGE
Start: 2021-04-16

## 2021-04-16 RX ORDER — INSULIN LISPRO 100/ML
15 VIAL (ML) SUBCUTANEOUS
Qty: 0 | Refills: 0 | DISCHARGE
Start: 2021-04-16

## 2021-04-16 RX ORDER — LIDOCAINE 4 G/100G
1 CREAM TOPICAL EVERY 24 HOURS
Refills: 0 | Status: DISCONTINUED | OUTPATIENT
Start: 2021-04-16 | End: 2021-04-21

## 2021-04-16 RX ADMIN — Medication 81 MILLIGRAM(S): at 11:02

## 2021-04-16 RX ADMIN — LIDOCAINE 1 PATCH: 4 CREAM TOPICAL at 18:02

## 2021-04-16 RX ADMIN — INSULIN GLARGINE 30 UNIT(S): 100 INJECTION, SOLUTION SUBCUTANEOUS at 21:39

## 2021-04-16 RX ADMIN — Medication 12 UNIT(S): at 08:01

## 2021-04-16 RX ADMIN — Medication 15 UNIT(S): at 16:58

## 2021-04-16 RX ADMIN — ATORVASTATIN CALCIUM 80 MILLIGRAM(S): 80 TABLET, FILM COATED ORAL at 21:40

## 2021-04-16 RX ADMIN — Medication 8: at 11:52

## 2021-04-16 RX ADMIN — Medication 15 UNIT(S): at 11:52

## 2021-04-16 RX ADMIN — ENOXAPARIN SODIUM 40 MILLIGRAM(S): 100 INJECTION SUBCUTANEOUS at 11:02

## 2021-04-16 RX ADMIN — CLOPIDOGREL BISULFATE 75 MILLIGRAM(S): 75 TABLET, FILM COATED ORAL at 11:02

## 2021-04-16 RX ADMIN — Medication 6: at 08:01

## 2021-04-16 RX ADMIN — LISINOPRIL 10 MILLIGRAM(S): 2.5 TABLET ORAL at 05:27

## 2021-04-16 RX ADMIN — Medication 2: at 16:58

## 2021-04-16 RX ADMIN — Medication 2 GRAM(S): at 05:27

## 2021-04-16 RX ADMIN — GABAPENTIN 300 MILLIGRAM(S): 400 CAPSULE ORAL at 21:39

## 2021-04-16 RX ADMIN — LIDOCAINE 1 PATCH: 4 CREAM TOPICAL at 19:00

## 2021-04-16 RX ADMIN — Medication 2 GRAM(S): at 17:03

## 2021-04-16 NOTE — CONSULT NOTE ADULT - SUBJECTIVE AND OBJECTIVE BOX
HPI:  65F hx of HTN, HLD, T2DM off meds since 2018 and has not followed up with PMD since then presenting with R sided numbness. Pt related she has not felt well which started about 4 days ago and felt her balance was off and occasional dizziness. Today after waking up, she took a shower at 11AM and at the time noticed exquisite painful sensitivity in the R lower extremity and throughout the day noted numbness in the RLE and RUE and R side of face and even on the R torso. She went to get a pedicure at around 2 PM and noticed again exquisite painful sensitivity in the R foot when placed in water. She did not have slurring of speech, visual issues, dysphagia, or focal weakness. She went to see PMD and advised ED visit to r/o CVA. In ED, out of window for tPA.  afebrile BP: 187/104 P: 74 sat 100% on RA. CT head and CT angio neg for acute infarct but with focal stenosis in bl M2 MCA and bl PCA. Blood glucose was 511. S/p IV Labetalol 20mg in ED and 6U regular insulin.   Pt feels well otherwise - wanted to sign out AMA because she didn't want to be moved upstairs where her mother had passed.  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 CTA revealed multiple B/L severe focal stenoses prox M2 branch and both MCAs B/L in PCA  As per neuro, continue high dose statin, ASA and Plavix for three weeks (started 4/14/21) then just ASA monotherapy      REVIEW OF SYSTEMS: No chest pain, shortness of breath, nausea, vomiting or diarhea.      PAST MEDICAL & SURGICAL HISTORY  HTN (hypertension)    HLD (hyperlipidemia)    Diabetes mellitus    History of ankle surgery        SOCIAL HISTORY  Smoking - Denied, EtOH - Denied, Drugs - Denied    FUNCTIONAL HISTORY:   Lives with spouse   Independent PTA     CURRENT FUNCTIONAL STATUS: Cga       FAMILY HISTORY   Family history of CVA (Father)        RECENT LABS/IMAGING  CBC Full  -  ( 16 Apr 2021 05:30 )  WBC Count : 5.03 K/uL  RBC Count : 5.09 M/uL  Hemoglobin : 14.3 g/dL  Hematocrit : 43.5 %  Platelet Count - Automated : 229 K/uL  Mean Cell Volume : 85.5 fl  Mean Cell Hemoglobin : 28.1 pg  Mean Cell Hemoglobin Concentration : 32.9 gm/dL  Auto Neutrophil # : x  Auto Lymphocyte # : x  Auto Monocyte # : x  Auto Eosinophil # : x  Auto Basophil # : x  Auto Neutrophil % : x  Auto Lymphocyte % : x  Auto Monocyte % : x  Auto Eosinophil % : x  Auto Basophil % : x    04-16    136  |  100  |  16  ----------------------------<  313<H>  4.6   |  28  |  0.76    Ca    9.3      16 Apr 2021 05:30    TPro  7.2  /  Alb  3.2<L>  /  TBili  0.7  /  DBili  x   /  AST  32  /  ALT  38  /  AlkPhos  70  04-15        VITALS  T(C): 37 (04-16-21 @ 08:05), Max: 37 (04-16-21 @ 08:05)  HR: 91 (04-16-21 @ 08:05) (84 - 91)  BP: 154/88 (04-16-21 @ 08:05) (140/80 - 174/83)  RR: 18 (04-16-21 @ 08:05) (18 - 18)  SpO2: 91% (04-16-21 @ 08:05) (91% - 98%)  Wt(kg): --    ALLERGIES  No Known Allergies      MEDICATIONS   acetaminophen   Tablet .. 650 milliGRAM(s) Oral every 6 hours PRN  aspirin enteric coated 81 milliGRAM(s) Oral daily  atorvastatin 80 milliGRAM(s) Oral at bedtime  clopidogrel Tablet 75 milliGRAM(s) Oral daily  dextrose 40% Gel 15 Gram(s) Oral once  dextrose 5%. 1000 milliLiter(s) IV Continuous <Continuous>  dextrose 5%. 1000 milliLiter(s) IV Continuous <Continuous>  dextrose 50% Injectable 25 Gram(s) IV Push once  dextrose 50% Injectable 12.5 Gram(s) IV Push once  dextrose 50% Injectable 25 Gram(s) IV Push once  enoxaparin Injectable 40 milliGRAM(s) SubCutaneous daily  glucagon  Injectable 1 milliGRAM(s) IntraMuscular once  insulin glargine Injectable (LANTUS) 30 Unit(s) SubCutaneous at bedtime  insulin lispro (ADMELOG) corrective regimen sliding scale   SubCutaneous three times a day before meals  insulin lispro (ADMELOG) corrective regimen sliding scale   SubCutaneous at bedtime  insulin lispro Injectable (ADMELOG) 15 Unit(s) SubCutaneous before breakfast  insulin lispro Injectable (ADMELOG) 15 Unit(s) SubCutaneous before lunch  insulin lispro Injectable (ADMELOG) 15 Unit(s) SubCutaneous before dinner  lisinopril 20 milliGRAM(s) Oral daily  omega-3-Acid Ethyl Esters 2 Gram(s) Oral two times a day  pantoprazole    Tablet 40 milliGRAM(s) Oral before breakfast      ----------------------------------------------------------------------------------------  PHYSICAL EXAM  Constitutional - NAD, Comfortable  HEENT - NCAT, EOMI  Neck - Supple, No limited ROM  Chest - CTA bilaterally, No wheeze, No rhonchi, No crackles  Cardiovascular - RRR, S1S2, No murmurs  Abdomen - BS+, Soft, NTND  Extremities - No C/C/E, No calf tenderness   Neurologic Exam -                    Cognitive - Awake, Alert, AAO to self, place, date, year, situation     Communication - Fluent, No dysarthria, no aphasia     Cranial Nerves - CN 2-12 intact     Motor - No focal deficits                       Sensory - Intact to LT     Reflexes - DTR Intact, No primitive reflexive     Balance - WNL Static  Psychiatric - Mood stable, Affect WNL         HPI:  65F hx of HTN, HLD, T2DM off meds since 2018 and has not followed up with PMD since then presenting with R sided numbness. Pt related she has not felt well which started about 4 days ago and felt her balance was off and occasional dizziness. Today after waking up, she took a shower at 11AM and at the time noticed exquisite painful sensitivity in the R lower extremity and throughout the day noted numbness in the RLE and RUE and R side of face and even on the R torso. She went to get a pedicure at around 2 PM and noticed again exquisite painful sensitivity in the R foot when placed in water. She did not have slurring of speech, visual issues, dysphagia, or focal weakness. She went to see PMD and advised ED visit to r/o CVA. In ED, out of window for tPA.  afebrile BP: 187/104 P: 74 sat 100% on RA. CT head and CT angio neg for acute infarct but with focal stenosis in bl M2 MCA and bl PCA. Blood glucose was 511. S/p IV Labetalol 20mg in ED and 6U regular insulin.   Pt feels well otherwise - wanted to sign out AMA because she didn't want to be moved upstairs where her mother had passed.  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 CTA revealed multiple B/L severe focal stenoses prox M2 branch and both MCAs B/L in PCA  As per neuro, continue high dose statin, ASA and Plavix for three weeks (started 4/14/21) then just ASA monotherapy. Per cards- needs LON and loop recorder but can be done as outpt   Her main complaint is severe face/arm/leg tingling, shock like sensations. no weakness.       REVIEW OF SYSTEMS: No chest pain, shortness of breath, nausea, vomiting or diarhea.      PAST MEDICAL & SURGICAL HISTORY  HTN (hypertension)    HLD (hyperlipidemia)    Diabetes mellitus    History of ankle surgery        SOCIAL HISTORY  Smoking - Denied, EtOH - Denied, Drugs - Denied    FUNCTIONAL HISTORY:   Lives with spouse   Independent PTA     CURRENT FUNCTIONAL STATUS: Cga       FAMILY HISTORY   Family history of CVA (Father)        RECENT LABS/IMAGING  CBC Full  -  ( 16 Apr 2021 05:30 )  WBC Count : 5.03 K/uL  RBC Count : 5.09 M/uL  Hemoglobin : 14.3 g/dL  Hematocrit : 43.5 %  Platelet Count - Automated : 229 K/uL  Mean Cell Volume : 85.5 fl  Mean Cell Hemoglobin : 28.1 pg  Mean Cell Hemoglobin Concentration : 32.9 gm/dL  Auto Neutrophil # : x  Auto Lymphocyte # : x  Auto Monocyte # : x  Auto Eosinophil # : x  Auto Basophil # : x  Auto Neutrophil % : x  Auto Lymphocyte % : x  Auto Monocyte % : x  Auto Eosinophil % : x  Auto Basophil % : x    04-16    136  |  100  |  16  ----------------------------<  313<H>  4.6   |  28  |  0.76    Ca    9.3      16 Apr 2021 05:30    TPro  7.2  /  Alb  3.2<L>  /  TBili  0.7  /  DBili  x   /  AST  32  /  ALT  38  /  AlkPhos  70  04-15        VITALS  T(C): 37 (04-16-21 @ 08:05), Max: 37 (04-16-21 @ 08:05)  HR: 91 (04-16-21 @ 08:05) (84 - 91)  BP: 154/88 (04-16-21 @ 08:05) (140/80 - 174/83)  RR: 18 (04-16-21 @ 08:05) (18 - 18)  SpO2: 91% (04-16-21 @ 08:05) (91% - 98%)  Wt(kg): --    ALLERGIES  No Known Allergies      MEDICATIONS   acetaminophen   Tablet .. 650 milliGRAM(s) Oral every 6 hours PRN  aspirin enteric coated 81 milliGRAM(s) Oral daily  atorvastatin 80 milliGRAM(s) Oral at bedtime  clopidogrel Tablet 75 milliGRAM(s) Oral daily  dextrose 40% Gel 15 Gram(s) Oral once  dextrose 5%. 1000 milliLiter(s) IV Continuous <Continuous>  dextrose 5%. 1000 milliLiter(s) IV Continuous <Continuous>  dextrose 50% Injectable 25 Gram(s) IV Push once  dextrose 50% Injectable 12.5 Gram(s) IV Push once  dextrose 50% Injectable 25 Gram(s) IV Push once  enoxaparin Injectable 40 milliGRAM(s) SubCutaneous daily  glucagon  Injectable 1 milliGRAM(s) IntraMuscular once  insulin glargine Injectable (LANTUS) 30 Unit(s) SubCutaneous at bedtime  insulin lispro (ADMELOG) corrective regimen sliding scale   SubCutaneous three times a day before meals  insulin lispro (ADMELOG) corrective regimen sliding scale   SubCutaneous at bedtime  insulin lispro Injectable (ADMELOG) 15 Unit(s) SubCutaneous before breakfast  insulin lispro Injectable (ADMELOG) 15 Unit(s) SubCutaneous before lunch  insulin lispro Injectable (ADMELOG) 15 Unit(s) SubCutaneous before dinner  lisinopril 20 milliGRAM(s) Oral daily  omega-3-Acid Ethyl Esters 2 Gram(s) Oral two times a day  pantoprazole    Tablet 40 milliGRAM(s) Oral before breakfast      ----------------------------------------------------------------------------------------  PHYSICAL EXAM  Constitutional - NAD, Comfortable  HEENT - NCAT, EOMI  Neck - Supple, No limited ROM  Chest - CTA bilaterally, No wheeze, No rhonchi, No crackles  Cardiovascular - RRR, S1S2, No murmurs  Abdomen - BS+, Soft, NTND  Extremities - No C/C/E, No calf tenderness   Neurologic Exam -                    Cognitive - Awake, Alert, AAO to self, place, date, year, situation     Communication - Fluent, No dysarthria, no aphasia     Cranial Nerves - CN 2-12 intact     Motor - No focal deficits                       Sensory - + allodynia R leg/arm      Reflexes - unable to test due to pain      Balance -  unable to test due to pain   Psychiatric - Mood stable, Affect WNL

## 2021-04-16 NOTE — DISCHARGE NOTE PROVIDER - CARE PROVIDERS DIRECT ADDRESSES
[email or web address]

## 2021-04-16 NOTE — DISCHARGE NOTE PROVIDER - NSDCMRMEDTOKEN_GEN_ALL_CORE_FT
aspirin 81 mg oral delayed release tablet: 1 tab(s) orally once a day  atorvastatin 80 mg oral tablet: 1 tab(s) orally once a day (at bedtime)  clopidogrel 75 mg oral tablet: 1 tab(s) orally once a day  insulin glargine: 30 unit(s) subcutaneous once a day (at bedtime)  insulin lispro 100 units/mL injectable solution: 15 unit(s) subcutaneous 3 times a day  before meals  lisinopril 20 mg oral tablet: 1 tab(s) orally once a day  omega-3 polyunsaturated fatty acids ethyl esters 1000 mg oral capsule: 2 cap(s) orally 2 times a day   aspirin 81 mg oral delayed release tablet: 1 tab(s) orally once a day  atorvastatin 80 mg oral tablet: 1 tab(s) orally once a day (at bedtime)  clopidogrel 75 mg oral tablet: 1 tab(s) orally once a day  gabapentin 300 mg oral capsule: 1 cap(s) orally 3 times a day  insulin lispro 100 units/mL injectable solution: 15 unit(s) subcutaneous 3 times a day  before meals  Lantus Solostar Pen 100 units/mL subcutaneous solution: 45 unit(s) subcutaneous once a day (at bedtime)   lidocaine 5% topical film: Apply topically to affected area once a day  lisinopril 20 mg oral tablet: 1 tab(s) orally once a day  omega-3 polyunsaturated fatty acids ethyl esters 1000 mg oral capsule: 2 cap(s) orally 2 times a day  ondansetron 4 mg oral tablet: 1 tab(s) orally every 6 hours, As needed, Nausea and/or Vomiting   aspirin 81 mg oral delayed release tablet: 1 tab(s) orally once a day  atorvastatin 80 mg oral tablet: 1 tab(s) orally once a day (at bedtime)  clopidogrel 75 mg oral tablet: 1 tab(s) orally once a day  gabapentin 300 mg oral capsule: 1 cap(s) orally 3 times a day  insulin lispro 100 units/mL injectable solution: 15 unit(s) subcutaneous 3 times a day  before meals  Lantus Solostar Pen 100 units/mL subcutaneous solution: 45 unit(s) subcutaneous once a day (at bedtime)   lidocaine 5% topical film: Apply topically to affected area once a day  lisinopril 20 mg oral tablet: 1 tab(s) orally once a day  omega-3 polyunsaturated fatty acids ethyl esters 1000 mg oral capsule: 2 cap(s) orally 2 times a day  ondansetron 4 mg oral tablet: 1 tab(s) orally every 6 hours, As needed, Nausea and/or Vomiting  pantoprazole 40 mg oral delayed release tablet: 1 tab(s) orally once a day (before a meal)  traMADol 50 mg oral tablet: 0.5 tab(s) orally every 6 hours, As needed, Moderate Pain (4 - 6)  traMADol 50 mg oral tablet: 1 tab(s) orally every 6 hours, As needed, Severe Pain (7 - 10)   aspirin 81 mg oral delayed release tablet: 1 tab(s) orally once a day  atorvastatin 80 mg oral tablet: 1 tab(s) orally once a day (at bedtime)  clopidogrel 75 mg oral tablet: 1 tab(s) orally once a day  gabapentin 300 mg oral capsule: 1 cap(s) orally 3 times a day  insulin lispro 100 units/mL injectable solution: 12 unit(s) subcutaneous 3 times a day  before meals   Lantus Solostar Pen 100 units/mL subcutaneous solution: 40 unit(s) subcutaneous once a day (at bedtime)   lidocaine 5% topical film: Apply topically to affected area once a day  lisinopril 20 mg oral tablet: 1 tab(s) orally once a day  omega-3 polyunsaturated fatty acids ethyl esters 1000 mg oral capsule: 2 cap(s) orally 2 times a day  ondansetron 4 mg oral tablet: 1 tab(s) orally every 6 hours, As needed, Nausea and/or Vomiting  pantoprazole 40 mg oral delayed release tablet: 1 tab(s) orally once a day (before a meal)  traMADol 50 mg oral tablet: 0.5 tab(s) orally every 6 hours, As needed, Moderate Pain (4 - 6)  traMADol 50 mg oral tablet: 1 tab(s) orally every 6 hours, As needed, Severe Pain (7 - 10)

## 2021-04-16 NOTE — DISCHARGE NOTE PROVIDER - NSDCCPCAREPLAN_GEN_ALL_CORE_FT
PRINCIPAL DISCHARGE DIAGNOSIS  Diagnosis: Acute cerebrovascular accident (CVA)  Assessment and Plan of Treatment: you were admitted to the hospital with right sided paresthesias.  an mri showed a tiny acute left sided stroke.  a neurologist and cardiologist saw you.  you are now cleared for acute rehab.      SECONDARY DISCHARGE DIAGNOSES  Diagnosis: Hyperglycemia  Assessment and Plan of Treatment: your blood sugar was high and now you are on insulin    Diagnosis: Accelerated hypertension  Assessment and Plan of Treatment:      PRINCIPAL DISCHARGE DIAGNOSIS  Diagnosis: Acute cerebrovascular accident (CVA)  Assessment and Plan of Treatment: you were admitted to the hospital with right sided paresthesias.  an mri showed a tiny acute left sided stroke.  a neurologist and cardiologist saw you.  you are now cleared for acute rehab.      SECONDARY DISCHARGE DIAGNOSES  Diagnosis: Hyperglycemia  Assessment and Plan of Treatment: your blood sugar was high and now you are on insulin    Diagnosis: Accelerated hypertension  Assessment and Plan of Treatment: continue meds    Diagnosis: Diabetes  Assessment and Plan of Treatment: continue insulin. see endocrinologist. a1c 13.9     PRINCIPAL DISCHARGE DIAGNOSIS  Diagnosis: Acute cerebrovascular accident (CVA)  Assessment and Plan of Treatment: you were admitted to the hospital with right sided paresthesias.  an mri showed a tiny acute left sided stroke.  a neurologist and cardiologist saw you.  you are now cleared for acute rehab. continue medicaitons as prescribed. followup with primary MD in 1 week and cardiology in 1-2 weeks after discharge from rehab. return to ER if symptoms recur or any new concerning symptoms      SECONDARY DISCHARGE DIAGNOSES  Diagnosis: Hyperglycemia  Assessment and Plan of Treatment: your blood sugar was high and now you are on insulin. A1c 13.9. consistent carb    Diagnosis: Accelerated hypertension  Assessment and Plan of Treatment: continue meds. DASH/TLC    Diagnosis: Diabetes  Assessment and Plan of Treatment: continue insulin. see endocrinologist. a1c 13.9

## 2021-04-16 NOTE — DISCHARGE NOTE PROVIDER - HOSPITAL COURSE
Hospital Course  65F hx of HTN, HLD, T2DM off meds since 2018 and has not followed up with PMD since then presenting with R sided numbness. Pt related she has not felt well which started about 4 days ago and felt her balance was off and occasional dizziness. Today after waking up, she took a shower at 11AM and at the time noticed exquisite painful sensitivity in the R lower extremity and throughout the day noted numbness in the RLE and RUE and R side of face and even on the R torso. She went to get a pedicure at around 2 PM and noticed again exquisite painful sensitivity in the R foot when placed in water. She did not have slurring of speech, visual issues, dysphagia, or focal weakness. She went to see PMD and advised ED visit to r/o CVA. In ED, out of window for tPA.  afebrile BP: 187/104 P: 74 sat 100% on RA. CT head and CT angio neg for acute infarct but with focal stenosis in bl M2 MCA and bl PCA. Blood glucose was 511. S/p IV Labetalol 20mg in ED and 6U regular insulin.   Pt feels well otherwise - wanted to sign out AMA because she didn't want to be moved upstairs where her mother had passed.     Discharging Provider:  Macario Hinton NP  Contact Info: Cell [phone] - Please call with any questions or concerns.    Outpatient Provider: Dr. Marie Dunne           65 female non compliant t2dm, htn, hld, presented with right sided numbness uncontrolled htn and hyperglycemia.    # Acute Left Medullary Infarct  # Large Vessel Disease  CTH unremarkable  CTA revealed multiple B/L severe focal stenoses prox M2 branch and both MCAs B/L in PCA  MRI showed tiny acute left medullary infarct  lipid profile reviewed, echo reviewed  neuro consult appreciated, cards consult appreciated  continue high dose statin, ASA and Plavix for three weeks (started 4/14/21) then just ASA monotherapy  rule out cardiac source - embolic dz, may need long term monitoring, LON  PMR/PT/OT evaluations noted. acute rehab when pain is better controlled. started on joelle 300 mg HS--> BID, changed to TID today    # T2DM with hyperglycemia uncontrolled  A1c 13.9  increased lantus from 30 to 40 unit.--> 45 unit today  c/w admelog 15 unit TIDAC  changed ISS low to moderate  DM NP cx noted  consistent carb. no evening snack  counselled about uncontrolled DM    # HTN  chronic condition  c/w lisinopril 20 mg daily   monitor BP carefully, continue to titrate as needed    # HLD  chronic condition  chol 221 tri 286 hdl 39 ldl 125  Continue Lipitor, Loveza    # Prophylactic Measure  protonix, lovenox  PT/OT/PMR consulted--> acute rehab once pain controlled      Discharging Provider:  Theresa Schuler MD  Contact Info: Cell [phone] - Please call with any questions or concerns.    Outpatient Provider: Dr. Marie Dunne           65 female non compliant t2dm, htn, hld, presented with right sided numbness uncontrolled htn and hyperglycemia.    # Acute Left Medullary Infarct  # Large Vessel Disease  CTH unremarkable  CTA revealed multiple B/L severe focal stenoses prox M2 branch and both MCAs B/L in PCA  MRI showed tiny acute left medullary infarct  lipid profile reviewed, echo reviewed  neuro consult appreciated, cards consult appreciated  continue high dose statin, ASA and Plavix for three weeks (started 4/14/21) then just ASA monotherapy  rule out cardiac source - embolic dz, may need long term monitoring, LON  PMR/PT/OT evaluations noted. acute rehab when pain is better controlled. started on joelle 300 mg HS--> BID, changed to TID today    # T2DM with hyperglycemia uncontrolled  A1c 13.9  increased lantus from 30 to 40 unit.--> 45 unit today  c/w admelog 15 unit TIDAC  changed ISS low to moderate  DM NP cx noted  consistent carb. no evening snack  counselled about uncontrolled DM    # HTN  chronic condition  c/w lisinopril 20 mg daily   monitor BP carefully, continue to titrate as needed    # HLD  chronic condition  chol 221 tri 286 hdl 39 ldl 125  Continue Lipitor, Loveza    # Prophylactic Measure  protonix, lovenox  PT/OT/PMR consulted--> acute rehab once pain controlled      Discharging Provider:  Theresa Schuler MD  Contact Info: Cell [phone] - Please call with any questions or concerns.    Outpatient Provider: Dr. Marie Dunne    Time spent 60 min           65 female non compliant t2dm, htn, hld, presented with right sided numbness uncontrolled htn and hyperglycemia.    # Acute Left Medullary Infarct  # Large Vessel Disease  CTH unremarkable  CTA revealed multiple B/L severe focal stenoses prox M2 branch and both MCAs B/L in PCA  MRI showed tiny acute left medullary infarct  lipid profile reviewed, echo reviewed  neuro consult appreciated, cards consult appreciated  continue high dose statin, ASA and Plavix for three weeks (started 4/14/21) then just ASA monotherapy  s/p LON today  possibly need ILR. can be done OP  PMR/PT/OT evaluations noted.--> acute rehab     # neuropathic pain  - Gabapentin TID.     # T2DM with hyperglycemia uncontrolled  A1c 13.9  c/w lantus and admelog 15 unit TIDAC  ISS, accuchek  seen by DM NP  consistent carb. no evening snack  counselled about uncontrolled DM    # HTN  chronic condition  c/w lisinopril 20 mg daily   monitor BP carefully, continue to titrate as needed    # HLD  chronic condition  chol 221 tri 286 hdl 39 ldl 125  Continue Lipitor, Loveza    # Prophylactic Measure  protonix, lovenox  PT/OT/PMR consulted--> acute rehab once pain controlled      Discharging Provider:  Theresa Schuler MD  Contact Info: Cell [phone] - Please call with any questions or concerns.    Outpatient Provider: Dr. Marie Dunne     Time spent 60 min           65 female non compliant t2dm, htn, hld, presented with right sided numbness uncontrolled htn and hyperglycemia.    # Acute Left Medullary Infarct  # Large Vessel Disease  CTH unremarkable  CTA revealed multiple B/L severe focal stenoses prox M2 branch and both MCAs B/L in PCA  MRI showed tiny acute left medullary infarct  lipid profile reviewed, echo reviewed  neuro consult appreciated, cards consult appreciated  continue high dose statin, ASA and Plavix for three weeks (started 4/14/21) then just ASA monotherapy  s/p LON today: no evidence of PFO or ASD, no intracardiac thrombus, no vegetations visualized   possibly need ILR. can be done OP  PMR/PT/OT evaluations noted.--> acute rehab     # neuropathic pain  - Gabapentin TID.     # T2DM with hyperglycemia uncontrolled  A1c 13.9  c/w lantus and admelog 15 unit TIDAC  ISS, accuchek  seen by DM NP  consistent carb. no evening snack  counselled about uncontrolled DM    # HTN  chronic condition  c/w lisinopril 20 mg daily   monitor BP carefully, continue to titrate as needed    # HLD  chronic condition  chol 221 tri 286 hdl 39 ldl 125  Continue Lipitor, Loveza    # Prophylactic Measure  protonix, lovenox  PT/OT/PMR consulted--> acute rehab once pain controlled          Discharging Provider:  Theresa Schuler MD  Contact Info: Cell [phone] - Please call with any questions or concerns.    Outpatient Provider: Dr. Marie Dunne [ informed]    Time spent 60 min

## 2021-04-16 NOTE — PHYSICAL THERAPY INITIAL EVALUATION ADULT - ADDITIONAL COMMENTS
pt lives w/spouse in private house/[place], [address] w/1 rail, 6 steps to br w/rail. pt was independent w/ambulation and all ADL's pta, pt drives and volunteers

## 2021-04-16 NOTE — ADVANCED PRACTICE NURSE CONSULT - RECOMMEDATIONS
Titrate Insulin to  – 180  Schedule FU with Emilie Keen NP, Diabetes Educator upon discharge for F/U.  Diabetes Education on BG monitoring, Insulin Administration, S/s and Tx of Hypoglycemia.

## 2021-04-16 NOTE — PROGRESS NOTE ADULT - ASSESSMENT
65 female non compliant t2dm, htn, hld, presented with right sided numbness uncontrolled htn and hyperglycemia.    # Acute Left Medullary Infarct  # Large Vessel Disease  CTH unremarkable  CTA revealed multiple B/L severe focal stenoses prox M2 branch and both MCAs B/L in PCA  MRI showed tiny acute left medullary infarct  lipid profile reviewed, echo reviewed  neuro consult appreciated, cards consult appreciated  continue high dose statin, ASA and Plavix for three weeks (started 4/14/21) then just ASA monotherapy  rule out cardiac source - embolic dz, may need long term monitoring, LON  PMR/PT/OT evaluations for Acute Rehab    # T2DM with hyperglycemia uncontrolled  A1c 13.9  Lantus 30 units qHS, Admelog 15 units pre-meal, low dose insulin sliding scale monitor BS, continue to titrate as needed    # HTN  chronic condition  Increase lisinopril 20 mg daily   monitor BP carefully, continue to titrate as needed    # HLD  chronic condition  chol 221 tri 286 hdl 39 ldl 125  Continue Lipitor, Loveza    # Prophylactic Measure  protonix, lovenox  PT/OT/PMR consult    attempted to call patient's spouse mira fragoso [phone] no answer and no option to leave voice message

## 2021-04-16 NOTE — DISCHARGE NOTE PROVIDER - CARE PROVIDER_API CALL
Marie Dunne)  Family Medicine  [address] [place] [address]  Phone: [phone]  Fax: [phone]  Follow Up Time:    Marie Dunne)  Family Medicine  [address] [place] [address]  Phone: [phone]  Fax: [phone]  Follow Up Time:     [place] [address]  Phone: [phone]  Fax: [phone]  Follow Up Time:

## 2021-04-16 NOTE — PROGRESS NOTE ADULT - SUBJECTIVE AND OBJECTIVE BOX
Follow up for    cva    SUBJ:   hyperesthesia, right, RUE weakness    PMH  HTN (hypertension)    HLD (hyperlipidemia)    Diabetes mellitus        MEDICATIONS  (STANDING):  aspirin enteric coated 81 milliGRAM(s) Oral daily  atorvastatin 80 milliGRAM(s) Oral at bedtime  clopidogrel Tablet 75 milliGRAM(s) Oral daily  dextrose 40% Gel 15 Gram(s) Oral once  dextrose 5%. 1000 milliLiter(s) (50 mL/Hr) IV Continuous <Continuous>  dextrose 5%. 1000 milliLiter(s) (100 mL/Hr) IV Continuous <Continuous>  dextrose 50% Injectable 25 Gram(s) IV Push once  dextrose 50% Injectable 12.5 Gram(s) IV Push once  dextrose 50% Injectable 25 Gram(s) IV Push once  enoxaparin Injectable 40 milliGRAM(s) SubCutaneous daily  glucagon  Injectable 1 milliGRAM(s) IntraMuscular once  insulin glargine Injectable (LANTUS) 30 Unit(s) SubCutaneous at bedtime  insulin lispro (ADMELOG) corrective regimen sliding scale   SubCutaneous three times a day before meals  insulin lispro (ADMELOG) corrective regimen sliding scale   SubCutaneous at bedtime  insulin lispro Injectable (ADMELOG) 15 Unit(s) SubCutaneous before breakfast  insulin lispro Injectable (ADMELOG) 15 Unit(s) SubCutaneous before lunch  insulin lispro Injectable (ADMELOG) 15 Unit(s) SubCutaneous before dinner  lisinopril 20 milliGRAM(s) Oral daily  omega-3-Acid Ethyl Esters 2 Gram(s) Oral two times a day  pantoprazole    Tablet 40 milliGRAM(s) Oral before breakfast    MEDICATIONS  (PRN):  acetaminophen   Tablet .. 650 milliGRAM(s) Oral every 6 hours PRN Temp greater or equal to 38C (100.4F), Mild Pain (1 - 3)        PHYSICAL EXAM:  Vital Signs Last 24 Hrs  T(C): 37 (16 Apr 2021 08:05), Max: 37 (16 Apr 2021 08:05)  T(F): 98.6 (16 Apr 2021 08:05), Max: 98.6 (16 Apr 2021 08:05)  HR: 91 (16 Apr 2021 08:05) (84 - 91)  BP: 154/88 (16 Apr 2021 08:05) (140/80 - 174/83)  BP(mean): --  RR: 18 (16 Apr 2021 08:05) (18 - 18)  SpO2: 91% (16 Apr 2021 08:05) (91% - 98%)    GENERAL: NAD, well-groomed, well-developed  HEAD:  Atraumatic, Normocephalic  EYES:  conjunctiva and sclera clear  ENT: Moist mucous membranes,  NECK: Supple, No JVD, no bruits  CHEST/LUNG: Clear to auscultation bilaterally; No rales, rhonchi, wheezing, or rubs  HEART: Regular rate and rhythm; No murmurs, rubs, or gallops PMI non displaced.  ABDOMEN: Soft, Nontender, Nondistended; Bowel sounds present  EXTREMITIES: , No clubbing, cyanosis, or edema  SKIN: No rashes or lesions  NERVOUS SYSTEM:  Alert  RUE weakness        LABS:                        14.3   5.03  )-----------( 229      ( 16 Apr 2021 05:30 )             43.5     04-16    136  |  100  |  16  ----------------------------<  313<H>  4.6   |  28  |  0.76    Ca    9.3      16 Apr 2021 05:30    TPro  7.2  /  Alb  3.2<L>  /  TBili  0.7  /  DBili  x   /  AST  32  /  ALT  38  /  AlkPhos  70  04-15      I&O's Summary    15 Apr 2021 07:01  -  16 Apr 2021 07:00  --------------------------------------------------------  IN: 400 mL / OUT: 0 mL / NET: 400 mL      RADIOLOGY & ADDITIONAL STUDIES:< from: MR Head No Cont (04.15.21 @ 13:39) >    EXAM:  MR BRAIN      PROCEDURE DATE:  04/15/2021        INTERPRETATION:  CLINICAL INDICATIONS: No Predefined Suggestions    COMPARISON: 7/23/2018 MRI brain, CT head dated/13/2020    TECHNIQUE: MRI brain: Multiplanar, multisequence MR imaging of thebrain are obtained without the administration of intravenous Gadavist contrast.    FINDINGS:  There is punctate focus of abnormal restricted diffusion in the left aspect of the mid to lower on image 8 of series 8.    Scattered periventricular and subcortical white matter T2 /FLAIR hyperintensities are seen without mass effect, nonspecific, likely representing minimal chronic microvascular changes.    Normal T2 flow-voids are seen within  the intracranial vasculature. The lateral ventricles and cortical sulci are age-appropriate in size and configuration. There is no mass, mass effect, or extra-axial fluid collection. There is no susceptibility artifact to suggest hemorrhage. Midline structures are normal.  The visualized paranasal sinuses, mastoid air cells and orbits are unremarkable.    Bilateral screening scalp nodular polypoid likely cutaneous lesions are redemonstrated. Direct visual inspection is recommended. Correlate clinically.    IMPRESSION: Acute tiny left lateral medullary infarct. Correlate clinically.        CARLOS BROOKS MD; Attending Radiologist  This document has been electronically signed. Apr 15 2021  1:53PM    < end of copied text >        < from: CT Angio Neck w/ IV Cont (04.13.21 @ 19:02) >  EXAM:  CT ANGIO BRAIN (W)AW IC    EXAM:  CT ANGIO NECK (W)AW IC    EXAM:  CT PERFUSION W MAPS IC      PROCEDURE DATE:  04/13/2021        INTERPRETATION:  CT PERFUSION WITH MAPS WITH IV CONTRAST, CT ANGIO NECK WITHOUT AND OR WITH IV CONTRAST, CT ANGIO BRAIN WITHOUT AND OR WITH IV CONTRAST  CT PERFUSION  CTA OF THE [place] AND NECK:    INDICATIONS: Stroke Code. Right-sided numbness. Hypertension. Noncompliant Diabetic.    No additional clinical history was provided.    TECHNIQUE:  RAPID artificial intelligence was used for perfusion analysis and for intracranial large vessel occlusion.    Contrast:    CTA [place]:  After the intravenous power injection of non-ionic contrast material, serial thin sections were obtained through the intracranial circulation on a multislice CT scanner.  Images were reformatted using a dedicated 3D software package and viewed on a dedicated workstation in multiple planes.    CTA NECK:  After the intravenous power injection of non-ionic contrast material, serial thin sections were obtained through the cervical circulation on a multislice CT scanner.  Images were reformatted using a dedicated 3D software package and viewed on a dedicated workstation in multiple planes.    COMPARISON EXAMINATION: None.    FINDINGS:    CT RAPID PERFUSION:  CBF<30% volume: 0 ml  Tmax>6.0 s volume: 0 ml  Mismatch volume: 0 ml  Mismatch ratio: none    CORE INFARCT: None.  TISSUE AT RISK: None.  MISMATCH RATIO: None.      CTA [place]:  ANTERIOR CIRCULATION  ICA  CAVERNOUS, SUPRACLINOID, BIFURCATION SEGMENTS: Patent without flow limiting stenosis.    ANTERIOR CEREBRAL ARTERIES: Bilateral A1, anterior communicating and A2 anterior cerebral arteries are unremarkable in course and caliber without flow limiting stenosis.    MIDDLE CEREBRAL ARTERIES: Patent bilateral M1 branches without flow limiting stenosis.  There are severe focal stenoses bilaterally in the proximal M2 branches of the MCA.    POSTERIOR CIRCULATION:  VERTEBRAL ARTERIES: Patent without flow limiting stenosis.  BASILAR ARTERY: Patent no flow limiting stenosis.  POSTERIOR CEREBRAL ARTERIES: Patent without flow limiting stenosis. Severe focal stenosis in the mid right PCA and diffuse long segment luminal irregularity in the leftPCA.      CTA NECK:  GREAT VESSELS: Visualized segments are patent, no flow limiting stenosis.    COMMON CAROTID ARTERIES:  RIGHT CCA: Patent without flow limiting stenosis  LEFT CCA: Patent without flow limiting stenosis    CAROTID BULBS:  RIGHT CB:Patent without flow limiting stenosis  LEFT CB: Patent without flow limiting stenosis    INTERNAL CAROTID ARTERIES:  RIGHT ICA: Patent no evidence for any hemodynamically significant stenosis at the ICA origin by NASCET criteria.  LEFT ICA: Patent noevidence for any hemodynamically significant stenosis at the ICA origin by NASCET criteria.    VERTEBRAL ARTERIES:  RIGHT VA: Patent no evidence for any flow limiting stenosis.  LEFT VA: Patent no evidence for any flow limiting stenosis.      SOFT TISSUES: Unremarkable  BONES: Unremarkable      IMPRESSION:    CT PERFUSION demonstrated: No core infarct. No active ischemia, despite multiple focal severe stenoses by CTA.  If symptoms persist consider follow up head CT or MRI, MRA  if no contraindication.    CTA COW:  Multiple bilateral severe focal stenoses noted in proximal M2 branches of both MCAs and bilaterally in the PCAs.    CTA NECK: Patent, ECAs, ICAs, no  hemodynamically significant stenosis at  ICA origins by NASCET criteria.  Bilateral vertebral arteries are patent without flow limiting stenosis.     Discussed with Dr. Barakat in the ED at 7:04 PM. MRI may be helpful for further evaluation, if clinically indicated.              BEN PILLAI MD, Attending Radiologist  This document has been electronically signed. Apr 13 2021  7:11PM    < end of copied text >    ECHO:

## 2021-04-16 NOTE — PROGRESS NOTE ADULT - SUBJECTIVE AND OBJECTIVE BOX
Patient is a 65 year old woman admitted yesterday, 4/13/21 with right face, arm, and leg numbness and tingling. She states yesterday morning noted the numbness and tingling suddenly involving the right face, arm, and leg which has persisted. She also notes HA which is generalized. She also notes lightheadedness on standing. She denies other neurological complaints. She denies fever, chest pain, trauma. Today, Friday, she states persists with right face, arm, and leg numbness and tingling.     PMH: DM- Dr. Marie Dunne          HTN    SH No allergy    Exam :Awake, alert, appropriate           Pupils 2.5mm, EOM intact, no nystagmus, VFF           CN II-XII intact           Motor tone and strength normal           Gait slow hesitant           Sensation decreased right face, arm, and leg                          13.0   5.32  )-----------( 233      ( 14 Apr 2021 05:45 )             39.0       04-14    136  |  100  |  9   ----------------------------<  366<H>  4.1   |  26  |  0.68    Ca    9.0      14 Apr 2021 05:45    TPro  7.4  /  Alb  3.3  /  TBili  0.3  /  DBili  x   /  AST  29  /  ALT  28  /  AlkPhos  95  04-14    Lipid Profile in AM (04.14.21 @ 05:45)    Cholesterol, Serum: 206 mg/dL    Triglycerides, Serum: 195 mg/dL    HDL Cholesterol, Serum: 37 mg/dL    Non HDL Cholesterol: 169: Patient’s Atherosclerotic Cardiovascular Disease (ASCVD) Risk  Optimal Level (mg/dL)  LDL Cholesterol (LDL-C)  All Patients                                < 100  ASCVD at Very High Risk1    < 70  Non-HDL Cholesterol (Non-HDL-C)  All Patients                        < 130  ASCVD at Very High Risk1   < 100  Non-HDL-Cholesterol (Non-HDL-C) is also a key target for cardiovascular  risk reduction.  Consider Familial Hypercholesterolemia when: LDL-C > 190 mg/dL or  Non-HDL-C > 220 mg/dL.  LDL-C calculation using the Friedewald equation is not provided when  triglycerides > 400 mg/dL, in which case we recommend repeating the test  after fasting, if it was not done before.  When triglycerides >150 mg/dL, calculated LDL-C is provided but may still  be inaccurate (particularly when LDL-C < 70 mg/dL). It can be  recalculated off-line using other equations (e.g. Nahun SS, Leilani SEXTON,  Irish YOUNG, et al.DEE 2013;310:2061 - 8).  1 Bridger Bernstein,et al. "2019 AHA/ACC. . . guideline on the  management of blood cholesterol: a report of the American College of  Cardiology/American Heart Association Task Force on Clinical Practice  Guidelines." Circulation;139:e1082 - e1143.  These values apply only to persons 20 years and older.  Lipid Panel updated with new test, reference ranges and interpretive  comments effective 10-. mg/dL    LDL Cholesterol Calculated: 130 mg/dL    < from: TTE Echo Complete w/o Contrast w/ Doppler (04.14.21 @ 08:52) >    Summary:   1. Left ventricular ejection fraction, by visual estimation, is 60 to 65%.   2. Normal global left ventricular systolic function.   3. Normal left ventricular internal cavity size.   4. Spectral Doppler shows impaired relaxation pattern ofleft ventricular myocardial filling (Grade I diastolic dysfunction).   5. There is mild asymmetric left ventricular hypertrophy.   6. Normal right ventricular size and function.   7. The left atrium is normal in size.   8. Thickening of the anterior and posterior mitral valve leaflets.   9. Trace mitral valve regurgitation.  10. Mild tricuspid regurgitation.  11. Sclerotic aortic valve with normal opening.  12. LA volume Index is 20.7 ml/m² ml/m              < from: CT Brain Stroke Protocol (04.13.21 @ 19:01) >  REBEKAHSON EXAMINATION: None.    FINDINGS:  HEAD CT:  VENTRICLES AND SULCI: Ventricles and sulci are unremarkable for patient age.  INTRA-AXIAL: No intracranial mass, acute hemorrhage, or midline shift is present.There is non-specific decreased attenuation in the white matter likely microvascular disease.  EXTRA-AXIAL: No extra-axial fluid collection is present.  INTRACRANIAL HEMORRHAGE: None.    VISUALIZED SINUSES: No air-fluid levels are identified.  VISUALIZED MASTOIDS:  Clear.  CALVARIUM:  Intact.  MISCELLANEOUS:None.    SOFT TISSUES: Unremarkable.  BONES: Unremarkable.      IMPRESSION:  HEAD CT: Mild volume loss, microvascular disease, no acute hemorrhage or midline shift.    Discussed with Dr. Barakat in the ED at 6:44 PM.          < from: CT Perfusion w/ Maps w/ IV Cont (04.13.21 @ 19:02) >  PARISON EXAMINATION: None.    FINDINGS:    CT RAPID PERFUSION:  CBF<30% volume: 0 ml  Tmax>6.0 s volume: 0 ml  Mismatch volume: 0 ml  Mismatch ratio: none    CORE INFARCT: None.  TISSUE AT RISK: None.  MISMATCH RATIO: None.      CTA [place] OF [place]:  ANTERIOR CIRCULATION  ICA  CAVERNOUS, SUPRACLINOID, BIFURCATION SEGMENTS: Patent without flow limiting stenosis.    ANTERIOR CEREBRAL ARTERIES: Bilateral A1, anterior communicating and A2 anterior cerebral arteries are unremarkable in course and caliber without flow limiting stenosis.    MIDDLE CEREBRAL ARTERIES: Patent bilateral M1 branches without flow limiting stenosis.  There are severe focal stenoses bilaterally in the proximal M2 branches of the MCA.    POSTERIOR CIRCULATION:  VERTEBRAL ARTERIES: Patent without flow limiting stenosis.  BASILAR ARTERY: Patent no flow limiting stenosis.      POSTERIOR CEREBRAL ARTERIES: Patent without flow limiting stenosis. Severe focal stenosis in the mid right PCA and diffuse long segment luminal irregularity in the leftPCA.      CTA NECK:  GREAT VESSELS: Visualized segments are patent, no flow limiting stenosis.    COMMON CAROTID ARTERIES:  RIGHT CCA: Patent without flow limiting stenosis  LEFT CCA: Patent without flow limiting stenosis    CAROTID BULBS:  RIGHT CB:Patent without flow limiting stenosis  LEFT CB: Patent without flow limiting stenosis    INTERNAL CAROTID ARTERIES:  RIGHT ICA: Patent no evidence for any hemodynamically significant stenosis at the ICA origin by NASCET criteria.  LEFT ICA: Patent noevidence for any hemodynamically significant stenosis at the ICA origin by NASCET criteria.    VERTEBRAL ARTERIES:  RIGHT VA: Patent no evidence for any flow limiting stenosis.  LEFT VA: Patent no evidence for any flow limiting stenosis.      SOFT TISSUES: Unremarkable  BONES: Unremarkable        < from: MR Head No Cont (04.15.21 @ 13:39) >    COMPARISON: 7/23/2018 MRI brain, CT head dated/13/2020    TECHNIQUE: MRI brain: Multiplanar, multisequence MR imaging of thebrain are obtained without the administration of intravenous Gadavist contrast.    FINDINGS:  There is punctate focus of abnormal restricted diffusion in the left aspect of the mid to lower on image 8 of series 8.    Scattered periventricular and subcortical white matter T2 /FLAIR hyperintensities are seen without mass effect, nonspecific, likely representing minimal chronic microvascular changes.    Normal T2 flow-voids are seen within  the intracranial vasculature. The lateral ventricles and cortical sulci are age-appropriate in size and configuration. There is no mass, mass effect, or extra-axial fluid collection. There is no susceptibility artifact to suggest hemorrhage. Midline structures are normal.  The visualized paranasal sinuses, mastoid air cells and orbits are unremarkable.    Bilateral screening scalp nodular polypoid likely cutaneous lesions are redemonstrated. Direct visual inspection is recommended. Correlate clinically.    IMPRESSION: Acute tiny left lateral medullary infarct. Correlate clinically.                  IMPRESSION:    CT PERFUSION demonstrated: No core infarct. No active ischemia, despite multiple focal severe stenoses by CTA.  If symptoms persist consider follow up head CT or MRI, MRA  if no contraindication.    CTA COW:  Multiple bilateral severe focal stenoses noted in proximal M2 branches of both MCAs and bilaterally in the PCAs.    CTA NECK: Patent, ECAs, ICAs, no  hemodynamically significant stenosis at  ICA origins by NASCET criteria.  Bilateral vertebral arteries are patent without flow limiting stenosis.     Discussed with Dr. Barakat in the ED at 7:04 PM. MRI may be helpful for further evaluation, if clinically indicated.

## 2021-04-16 NOTE — CONSULT NOTE ADULT - ASSESSMENT
65 year old woman admitted with right sided parasthesias... concern for CVA.  CTA demonstrates multiple bilateral focal stenosis proximal M2 branch and both MCAs and PCAs  She denies any prior cardiac history.  Transthoracic echo as above.... normal LV function and no significant valvular or pericardial disease.  Elevated BP.  DM.   Rule out cardiac source of embolism    Plan  - continue telemetry... if no AF noted, will need to be considered for long term ambulatory cardiac monitoring  - may need to consider LON as well to evaluate for possible cardiac source of embolism  - agree with ASA and high dose statin  - lisinopril started... may need to increase dose  - Neuro workup in progress... MRI pending     discussed with patient and her daughter who is at bedside  message left to discuss with Hospitalist     
    Patient is a 65 year old woman admitted yesterday, 4/13/21 with right face, arm, and leg numbness and tingling. She states yesterday morning noted the numbness and tingling suddenly involving the right face, arm, and leg which has persisted. She also notes HA which is generalized. She also notes lightheadedness on standing. She denies other neurological complaints. She denies fever, chest pain, trauma. Neurological exam as above. Would be concerned with left cerebral ischemia secondary to small vessel disease.    1) CT Head as above no hemorrhage. Microvascular ischemic disease.  2) CTA Head and Neck  as above bilateral M1 branches without flow limiting stenoses. There are severe focal stenoses bilaterally proximal M2 branches MCA                                                 PCA without flow limiting stenoses, severe focal mid right PCA stenosis, and irregularity left PCA                                                 No significant carotid stenosis  3) CT Perfusion as above no core infarct, no active ischemia.  4) With evidence of large vessel disease would place on ASA 81 mg and Plavix 75 mg for 3 weeks and then continue ASA 81 mg alone.  5) MRI Head  6) Await Echo .  7) Cardiology Consult With evidence of multiple stenoses of large vessels as above would consider further evaluation with regard to possible contributing embolic disease including LON and extended cardiac monitoring.
1. PT- bed mobility,transfers, gait and balance training  2. OT- ADL'S  3. Neuropathic pain- start gabapentin 300 mg qhs and titrate to TID if can tolerate. Lidocaine cream to RUE/RLE   4. CVA-Asa/Plavix, outpt LON, Loop   5. Patient would benefit from acute rehab, needs a multidisciplinary team including PT, OT and speech. Can tolerate 3 hours of therapy a day. Would reassess when pain is better   Will follow.

## 2021-04-16 NOTE — ADVANCED PRACTICE NURSE CONSULT - ASSESSMENT
Patient is a 65y old Female who presents with a chief complaint of R sided numbness.  A1C currently 13.9.  POCT Blood Glucose.: 280 mg/dL (16 Apr 2021 07:45)  POCT Blood Glucose.: 203 mg/dL (15 Apr 2021 21:48)  POCT Blood Glucose.: 179 mg/dL (15 Apr 2021 17:04)  POCT Blood Glucose.: 284 mg/dL (15 Apr 2021 11:24)    Currently Diabetes Regime:  Lantus increased from 25- 30 and Admelog AC increased from 12 to 15 units today, 4/16/21 along with AC and HS correction insulin.      Pt alert and oriented Xs 4. Pt states she was aware she has T2DM was prescribed metformin years ago which she never took.   Eyes- reading glasses, denies any issues, has never had eye exam  Teeth- missing teeth noted- denies any pain or loose teeth presently  Feet- dry but skin intact- right side hypertensive but no numbness in low extremities.  Denies smoking history.    Pt lacks knowledge regarding T2DM disease process, progression, & diabetes self-management skills.  Education provided on T2DM disease process, progression and complication related to uncontrolled T2DM.   Provided written resources of Leaving the hospital with Diabetes, Insulin pen use, S/S and Tx of hypoglycemia handout form from Learning about Diabetes.   Showed pt how to view Diabetes Education  Videos and provided written directions to navigate to videos.  Gave pt a Contour Next EZ meter for home BG monitoring.  Educated primary RNSamantha to begin teaching pt FS technique, how to use home BG meter and administer Insulin.

## 2021-04-16 NOTE — PROGRESS NOTE ADULT - SUBJECTIVE AND OBJECTIVE BOX
Patient is a 65y old  Female who presents with a chief complaint of R sided numbness (15 Apr 2021 22:36)    Patient seen and examined at bedside.  pt does not offer any acute complaints    ALLERGIES:  No Known Allergies        Vital Signs Last 24 Hrs  T(F): 98.6 (16 Apr 2021 08:05), Max: 98.6 (16 Apr 2021 08:05)  HR: 91 (16 Apr 2021 08:05) (84 - 91)  BP: 154/88 (16 Apr 2021 08:05) (140/80 - 174/83)  RR: 18 (16 Apr 2021 08:05) (17 - 18)  SpO2: 91% (16 Apr 2021 08:05) (91% - 98%)  I&O's Summary    15 Apr 2021 07:01  -  16 Apr 2021 07:00  --------------------------------------------------------  IN: 400 mL / OUT: 0 mL / NET: 400 mL      MEDICATIONS:  acetaminophen   Tablet .. 650 milliGRAM(s) Oral every 6 hours PRN  aspirin enteric coated 81 milliGRAM(s) Oral daily  atorvastatin 80 milliGRAM(s) Oral at bedtime  clopidogrel Tablet 75 milliGRAM(s) Oral daily  dextrose 40% Gel 15 Gram(s) Oral once  dextrose 5%. 1000 milliLiter(s) IV Continuous <Continuous>  dextrose 5%. 1000 milliLiter(s) IV Continuous <Continuous>  dextrose 50% Injectable 25 Gram(s) IV Push once  dextrose 50% Injectable 12.5 Gram(s) IV Push once  dextrose 50% Injectable 25 Gram(s) IV Push once  enoxaparin Injectable 40 milliGRAM(s) SubCutaneous daily  glucagon  Injectable 1 milliGRAM(s) IntraMuscular once  insulin glargine Injectable (LANTUS) 25 Unit(s) SubCutaneous at bedtime  insulin lispro (ADMELOG) corrective regimen sliding scale   SubCutaneous three times a day before meals  insulin lispro (ADMELOG) corrective regimen sliding scale   SubCutaneous at bedtime  insulin lispro Injectable (ADMELOG) 12 Unit(s) SubCutaneous three times a day before meals  lisinopril 10 milliGRAM(s) Oral daily  omega-3-Acid Ethyl Esters 2 Gram(s) Oral two times a day  pantoprazole    Tablet 40 milliGRAM(s) Oral before breakfast      PHYSICAL EXAM:  General: NAD, A/O x 3  ENT: MMM, no oral thrush  Neck: Supple, No JVD  Lungs: Clear to auscultation bilaterally, non labored breathing  Cardio: S1S2 regular  Abdomen: Soft, Nontender  Extremities: No cyanosis, No edema    LABS:                        14.3   5.03  )-----------( 229      ( 16 Apr 2021 05:30 )             43.5     04-16    136  |  100  |  16  ----------------------------<  313  4.6   |  28  |  0.76    Ca    9.3      16 Apr 2021 05:30    TPro  7.2  /  Alb  3.2  /  TBili  0.7  /  DBili  x   /  AST  32  /  ALT  38  /  AlkPhos  70  04-15    eGFR if Non African American: 84 mL/min/1.73M2 (04-16-21 @ 05:30)  eGFR if African American: 97 mL/min/1.73M2 (04-16-21 @ 05:30)    PT/INR - ( 13 Apr 2021 18:48 )   PT: 12.4 sec;   INR: 1.03 ratio         PTT - ( 13 Apr 2021 18:48 )  PTT:34.8 sec    CARDIAC MARKERS ( 13 Apr 2021 18:48 )  <.017 ng/mL / x     / x     / x     / x          04-14 Chol 206 mg/dL LDL -- HDL 37 mg/dL Trig 195 mg/dL  TSH 1.85   TSH with FT4 reflex --  Total T3 --    18:45 - VBG - pH: 7.38  | pCO2: 53    | pO2: 32    | Lactate:                  POCT Blood Glucose.: 280 mg/dL (16 Apr 2021 07:45)  POCT Blood Glucose.: 203 mg/dL (15 Apr 2021 21:48)  POCT Blood Glucose.: 179 mg/dL (15 Apr 2021 17:04)  POCT Blood Glucose.: 284 mg/dL (15 Apr 2021 11:24)            RADIOLOGY & ADDITIONAL TESTS:  < from: MR Head No Cont (04.15.21 @ 13:39) >  IMPRESSION: Acute tiny left lateral medullary infarct. Correlate clinically.                CARLOS BROOKS MD; Attending Radiologist  This document has been electronically signed. Apr 15 2021  1:53PM    < end of copied text >      Care Discussed with Consultants/Other Providers:   Dr. Owen

## 2021-04-16 NOTE — DISCHARGE NOTE PROVIDER - PROVIDER TOKENS
PROVIDER:[TOKEN:[[id number]]] PROVIDER:[TOKEN:[[id number]]],PROVIDER:[TOKEN:[[id number]]]

## 2021-04-17 LAB
ANION GAP SERPL CALC-SCNC: 8 MMOL/L — SIGNIFICANT CHANGE UP (ref 5–17)
BUN SERPL-MCNC: 20 MG/DL — SIGNIFICANT CHANGE UP (ref 7–23)
CALCIUM SERPL-MCNC: 9.1 MG/DL — SIGNIFICANT CHANGE UP (ref 8.4–10.5)
CHLORIDE SERPL-SCNC: 103 MMOL/L — SIGNIFICANT CHANGE UP (ref 96–108)
CO2 SERPL-SCNC: 27 MMOL/L — SIGNIFICANT CHANGE UP (ref 22–31)
CREAT SERPL-MCNC: 0.71 MG/DL — SIGNIFICANT CHANGE UP (ref 0.5–1.3)
GLUCOSE BLDC GLUCOMTR-MCNC: 141 MG/DL — HIGH (ref 70–99)
GLUCOSE BLDC GLUCOMTR-MCNC: 143 MG/DL — HIGH (ref 70–99)
GLUCOSE BLDC GLUCOMTR-MCNC: 231 MG/DL — HIGH (ref 70–99)
GLUCOSE BLDC GLUCOMTR-MCNC: 238 MG/DL — HIGH (ref 70–99)
GLUCOSE SERPL-MCNC: 314 MG/DL — HIGH (ref 70–99)
HCT VFR BLD CALC: 40.7 % — SIGNIFICANT CHANGE UP (ref 34.5–45)
HGB BLD-MCNC: 13.2 G/DL — SIGNIFICANT CHANGE UP (ref 11.5–15.5)
MCHC RBC-ENTMCNC: 27.4 PG — SIGNIFICANT CHANGE UP (ref 27–34)
MCHC RBC-ENTMCNC: 32.4 GM/DL — SIGNIFICANT CHANGE UP (ref 32–36)
MCV RBC AUTO: 84.6 FL — SIGNIFICANT CHANGE UP (ref 80–100)
NRBC # BLD: 0 /100 WBCS — SIGNIFICANT CHANGE UP (ref 0–0)
PLATELET # BLD AUTO: 211 K/UL — SIGNIFICANT CHANGE UP (ref 150–400)
POTASSIUM SERPL-MCNC: 4.5 MMOL/L — SIGNIFICANT CHANGE UP (ref 3.5–5.3)
POTASSIUM SERPL-SCNC: 4.5 MMOL/L — SIGNIFICANT CHANGE UP (ref 3.5–5.3)
RBC # BLD: 4.81 M/UL — SIGNIFICANT CHANGE UP (ref 3.8–5.2)
RBC # FLD: 12.6 % — SIGNIFICANT CHANGE UP (ref 10.3–14.5)
SODIUM SERPL-SCNC: 138 MMOL/L — SIGNIFICANT CHANGE UP (ref 135–145)
WBC # BLD: 5.11 K/UL — SIGNIFICANT CHANGE UP (ref 3.8–10.5)
WBC # FLD AUTO: 5.11 K/UL — SIGNIFICANT CHANGE UP (ref 3.8–10.5)

## 2021-04-17 PROCEDURE — 99233 SBSQ HOSP IP/OBS HIGH 50: CPT

## 2021-04-17 RX ORDER — INSULIN GLARGINE 100 [IU]/ML
40 INJECTION, SOLUTION SUBCUTANEOUS AT BEDTIME
Refills: 0 | Status: DISCONTINUED | OUTPATIENT
Start: 2021-04-17 | End: 2021-04-18

## 2021-04-17 RX ORDER — LISINOPRIL 2.5 MG/1
20 TABLET ORAL
Refills: 0 | Status: DISCONTINUED | OUTPATIENT
Start: 2021-04-17 | End: 2021-04-21

## 2021-04-17 RX ORDER — GABAPENTIN 400 MG/1
300 CAPSULE ORAL
Refills: 0 | Status: DISCONTINUED | OUTPATIENT
Start: 2021-04-17 | End: 2021-04-18

## 2021-04-17 RX ADMIN — Medication 2 GRAM(S): at 17:34

## 2021-04-17 RX ADMIN — ATORVASTATIN CALCIUM 80 MILLIGRAM(S): 80 TABLET, FILM COATED ORAL at 22:20

## 2021-04-17 RX ADMIN — LISINOPRIL 20 MILLIGRAM(S): 2.5 TABLET ORAL at 17:34

## 2021-04-17 RX ADMIN — GABAPENTIN 300 MILLIGRAM(S): 400 CAPSULE ORAL at 17:34

## 2021-04-17 RX ADMIN — Medication 4: at 08:26

## 2021-04-17 RX ADMIN — Medication 15 UNIT(S): at 11:51

## 2021-04-17 RX ADMIN — ENOXAPARIN SODIUM 40 MILLIGRAM(S): 100 INJECTION SUBCUTANEOUS at 11:51

## 2021-04-17 RX ADMIN — INSULIN GLARGINE 40 UNIT(S): 100 INJECTION, SOLUTION SUBCUTANEOUS at 22:27

## 2021-04-17 RX ADMIN — Medication 15 UNIT(S): at 08:25

## 2021-04-17 RX ADMIN — Medication 2 GRAM(S): at 06:05

## 2021-04-17 RX ADMIN — Medication 81 MILLIGRAM(S): at 11:51

## 2021-04-17 RX ADMIN — LISINOPRIL 20 MILLIGRAM(S): 2.5 TABLET ORAL at 06:05

## 2021-04-17 RX ADMIN — LIDOCAINE 1 PATCH: 4 CREAM TOPICAL at 17:34

## 2021-04-17 RX ADMIN — Medication 15 UNIT(S): at 17:34

## 2021-04-17 RX ADMIN — LIDOCAINE 1 PATCH: 4 CREAM TOPICAL at 06:05

## 2021-04-17 RX ADMIN — Medication 4: at 11:51

## 2021-04-17 RX ADMIN — PANTOPRAZOLE SODIUM 40 MILLIGRAM(S): 20 TABLET, DELAYED RELEASE ORAL at 06:05

## 2021-04-17 RX ADMIN — CLOPIDOGREL BISULFATE 75 MILLIGRAM(S): 75 TABLET, FILM COATED ORAL at 11:51

## 2021-04-17 NOTE — PROGRESS NOTE ADULT - SUBJECTIVE AND OBJECTIVE BOX
Patient is a 65 year old woman admitted yesterday, 4/13/21 with right face, arm, and leg numbness and tingling. She states yesterday morning noted the numbness and tingling suddenly involving the right face, arm, and leg which has persisted. She also notes HA which is generalized. She also notes lightheadedness on standing. She denies other neurological complaints. She denies fever, chest pain, trauma. Today, Saturday, she states persists with right face, arm, and leg numbness and tingling.     PMH: DM- Dr. Marie Dunne          HTN    SH No allergy    Exam :Awake, alert, appropriate           Pupils 2.5mm, EOM intact, no nystagmus, VFF           CN II-XII intact           Motor tone and strength normal           Gait slow hesitant           Sensation decreased right face, arm, and leg                          13.0   5.32  )-----------( 233      ( 14 Apr 2021 05:45 )             39.0       04-14    136  |  100  |  9   ----------------------------<  366<H>  4.1   |  26  |  0.68    Ca    9.0      14 Apr 2021 05:45    TPro  7.4  /  Alb  3.3  /  TBili  0.3  /  DBili  x   /  AST  29  /  ALT  28  /  AlkPhos  95  04-14    Lipid Profile in AM (04.14.21 @ 05:45)    Cholesterol, Serum: 206 mg/dL    Triglycerides, Serum: 195 mg/dL    HDL Cholesterol, Serum: 37 mg/dL    Non HDL Cholesterol: 169: Patient’s Atherosclerotic Cardiovascular Disease (ASCVD) Risk  Optimal Level (mg/dL)  LDL Cholesterol (LDL-C)  All Patients                                < 100  ASCVD at Very High Risk1    < 70  Non-HDL Cholesterol (Non-HDL-C)  All Patients                        < 130  ASCVD at Very High Risk1   < 100  Non-HDL-Cholesterol (Non-HDL-C) is also a key target for cardiovascular  risk reduction.  Consider Familial Hypercholesterolemia when: LDL-C > 190 mg/dL or  Non-HDL-C > 220 mg/dL.  LDL-C calculation using the Friedewald equation is not provided when  triglycerides > 400 mg/dL, in which case we recommend repeating the test  after fasting, if it was not done before.  When triglycerides >150 mg/dL, calculated LDL-C is provided but may still  be inaccurate (particularly when LDL-C < 70 mg/dL). It can be  recalculated off-line using other equations (e.g. Nahun SS, Leilani SEXTON,  Irish YOUNG, et al.DEE 2013;310:2061 - 8).  1 Bridger Bernstein,et al. "2019 AHA/ACC. . . guideline on the  management of blood cholesterol: a report of the American College of  Cardiology/American Heart Association Task Force on Clinical Practice  Guidelines." Circulation;139:e1082 - e1143.  These values apply only to persons 20 years and older.  Lipid Panel updated with new test, reference ranges and interpretive  comments effective 10-. mg/dL    LDL Cholesterol Calculated: 130 mg/dL    < from: TTE Echo Complete w/o Contrast w/ Doppler (04.14.21 @ 08:52) >    Summary:   1. Left ventricular ejection fraction, by visual estimation, is 60 to 65%.   2. Normal global left ventricular systolic function.   3. Normal left ventricular internal cavity size.   4. Spectral Doppler shows impaired relaxation pattern ofleft ventricular myocardial filling (Grade I diastolic dysfunction).   5. There is mild asymmetric left ventricular hypertrophy.   6. Normal right ventricular size and function.   7. The left atrium is normal in size.   8. Thickening of the anterior and posterior mitral valve leaflets.   9. Trace mitral valve regurgitation.  10. Mild tricuspid regurgitation.  11. Sclerotic aortic valve with normal opening.  12. LA volume Index is 20.7 ml/m² ml/m              < from: CT Brain Stroke Protocol (04.13.21 @ 19:01) >  REBEKAHSON EXAMINATION: None.    FINDINGS:  HEAD CT:  VENTRICLES AND SULCI: Ventricles and sulci are unremarkable for patient age.  INTRA-AXIAL: No intracranial mass, acute hemorrhage, or midline shift is present.There is non-specific decreased attenuation in the white matter likely microvascular disease.  EXTRA-AXIAL: No extra-axial fluid collection is present.  INTRACRANIAL HEMORRHAGE: None.    VISUALIZED SINUSES: No air-fluid levels are identified.  VISUALIZED MASTOIDS:  Clear.  CALVARIUM:  Intact.  MISCELLANEOUS:None.    SOFT TISSUES: Unremarkable.  BONES: Unremarkable.      IMPRESSION:  HEAD CT: Mild volume loss, microvascular disease, no acute hemorrhage or midline shift.    Discussed with Dr. Barakat in the ED at 6:44 PM.          < from: CT Perfusion w/ Maps w/ IV Cont (04.13.21 @ 19:02) >  PARISON EXAMINATION: None.    FINDINGS:    CT RAPID PERFUSION:  CBF<30% volume: 0 ml  Tmax>6.0 s volume: 0 ml  Mismatch volume: 0 ml  Mismatch ratio: none    CORE INFARCT: None.  TISSUE AT RISK: None.  MISMATCH RATIO: None.      CTA [place]:  ANTERIOR CIRCULATION  ICA  CAVERNOUS, SUPRACLINOID, BIFURCATION SEGMENTS: Patent without flow limiting stenosis.    ANTERIOR CEREBRAL ARTERIES: Bilateral A1, anterior communicating and A2 anterior cerebral arteries are unremarkable in course and caliber without flow limiting stenosis.    MIDDLE CEREBRAL ARTERIES: Patent bilateral M1 branches without flow limiting stenosis.  There are severe focal stenoses bilaterally in the proximal M2 branches of the MCA.    POSTERIOR CIRCULATION:  VERTEBRAL ARTERIES: Patent without flow limiting stenosis.  BASILAR ARTERY: Patent no flow limiting stenosis.      POSTERIOR CEREBRAL ARTERIES: Patent without flow limiting stenosis. Severe focal stenosis in the mid right PCA and diffuse long segment luminal irregularity in the leftPCA.      CTA NECK:  GREAT VESSELS: Visualized segments are patent, no flow limiting stenosis.    COMMON CAROTID ARTERIES:  RIGHT CCA: Patent without flow limiting stenosis  LEFT CCA: Patent without flow limiting stenosis    CAROTID BULBS:  RIGHT CB:Patent without flow limiting stenosis  LEFT CB: Patent without flow limiting stenosis    INTERNAL CAROTID ARTERIES:  RIGHT ICA: Patent no evidence for any hemodynamically significant stenosis at the ICA origin by NASCET criteria.  LEFT ICA: Patent noevidence for any hemodynamically significant stenosis at the ICA origin by NASCET criteria.    VERTEBRAL ARTERIES:  RIGHT VA: Patent no evidence for any flow limiting stenosis.  LEFT VA: Patent no evidence for any flow limiting stenosis.      SOFT TISSUES: Unremarkable  BONES: Unremarkable        < from: MR Head No Cont (04.15.21 @ 13:39) >    COMPARISON: 7/23/2018 MRI brain, CT head dated/13/2020    TECHNIQUE: MRI brain: Multiplanar, multisequence MR imaging of thebrain are obtained without the administration of intravenous Gadavist contrast.    FINDINGS:  There is punctate focus of abnormal restricted diffusion in the left aspect of the mid to lower on image 8 of series 8.    Scattered periventricular and subcortical white matter T2 /FLAIR hyperintensities are seen without mass effect, nonspecific, likely representing minimal chronic microvascular changes.    Normal T2 flow-voids are seen within  the intracranial vasculature. The lateral ventricles and cortical sulci are age-appropriate in size and configuration. There is no mass, mass effect, or extra-axial fluid collection. There is no susceptibility artifact to suggest hemorrhage. Midline structures are normal.  The visualized paranasal sinuses, mastoid air cells and orbits are unremarkable.    Bilateral screening scalp nodular polypoid likely cutaneous lesions are redemonstrated. Direct visual inspection is recommended. Correlate clinically.    IMPRESSION: Acute tiny left lateral medullary infarct. Correlate clinically.                  IMPRESSION:    CT PERFUSION demonstrated: No core infarct. No active ischemia, despite multiple focal severe stenoses by CTA.  If symptoms persist consider follow up head CT or MRI, MRA  if no contraindication.    CTA COW:  Multiple bilateral severe focal stenoses noted in proximal M2 branches of both MCAs and bilaterally in the PCAs.    CTA NECK: Patent, ECAs, ICAs, no  hemodynamically significant stenosis at  ICA origins by NASCET criteria.  Bilateral vertebral arteries are patent without flow limiting stenosis.     Discussed with Dr. Barakat in the ED at 7:04 PM. MRI may be helpful for further evaluation, if clinically indicated.

## 2021-04-17 NOTE — PROGRESS NOTE ADULT - ASSESSMENT
Patient is a 65 year old woman admitted yesterday, 4/13/21 with right face, arm, and leg numbness and tingling. She states yesterday morning noted the numbness and tingling suddenly involving the right face, arm, and leg which has persisted. She also notes HA which is generalized. She also notes lightheadedness on standing. She denies other neurological complaints. She denies fever, chest pain, trauma. Neurological exam as above. Would be concerned with left cerebral ischemia secondary to small vessel disease.    1) CT Head as above no hemorrhage. Microvascular ischemic disease.  2) CTA Head and Neck  as above bilateral M1 branches without flow limiting stenoses. There are severe focal stenoses bilaterally proximal M2 branches MCA                                                 PCA without flow limiting stenoses, severe focal mid right PCA stenosis, and irregularity left PCA                                                 No significant carotid stenosis  3) CT Perfusion  no core infarct, no active ischemia.  4) With evidence of large vessel disease would place on ASA 81 mg and Plavix 75 mg for 3 weeks and then continue ASA 81 mg alone.  5) MRI Head acute left lateral medullary infarct.  6)  Echo .no thrombus.  7)  Cardiology Consult With evidence of multiple stenoses of large vessels as above would consider further evaluation with regard to possible contributing embolic disease including LON and extended cardiac monitoring.       Appreciate Cardiology consult. Consideration of LON and extended cardiac monitoring.

## 2021-04-17 NOTE — PROGRESS NOTE ADULT - ASSESSMENT
65 female non compliant t2dm, htn, hld, presented with right sided numbness uncontrolled htn and hyperglycemia.    # Acute Left Medullary Infarct  # Large Vessel Disease  CTH unremarkable  CTA revealed multiple B/L severe focal stenoses prox M2 branch and both MCAs B/L in PCA  MRI showed tiny acute left medullary infarct  lipid profile reviewed, echo reviewed  neuro consult appreciated, cards consult appreciated  continue high dose statin, ASA and Plavix for three weeks (started 4/14/21) then just ASA monotherapy  rule out cardiac source - embolic dz, may need long term monitoring, LON  PMR/PT/OT evaluations noted. acute rehab when pain is better controlled. started on joelle 300 mg HS, changed to BID today    # T2DM with hyperglycemia uncontrolled  A1c 13.9  increased lantus from 30 to 40 unit.  c/w admelog 15 unit TIDAC  changed ISS low to moderate  DM NP cx noted  consistent carb. no evening snack    # HTN  chronic condition  c/w lisinopril 20 mg daily   monitor BP carefully, continue to titrate as needed    # HLD  chronic condition  chol 221 tri 286 hdl 39 ldl 125  Continue Lipitor, Loveza    # Prophylactic Measure  protonix, lovenox  PT/OT/PMR consulted--> acute rehab once pain controlled    called patient's spouse Arnulfo fragoso [phone] no answer and no option to leave voice message

## 2021-04-18 LAB
GLUCOSE BLDC GLUCOMTR-MCNC: 149 MG/DL — HIGH (ref 70–99)
GLUCOSE BLDC GLUCOMTR-MCNC: 175 MG/DL — HIGH (ref 70–99)
GLUCOSE BLDC GLUCOMTR-MCNC: 221 MG/DL — HIGH (ref 70–99)
GLUCOSE BLDC GLUCOMTR-MCNC: 268 MG/DL — HIGH (ref 70–99)

## 2021-04-18 PROCEDURE — 99232 SBSQ HOSP IP/OBS MODERATE 35: CPT

## 2021-04-18 RX ORDER — LIDOCAINE 4 G/100G
1 CREAM TOPICAL
Qty: 0 | Refills: 0 | DISCHARGE
Start: 2021-04-18

## 2021-04-18 RX ORDER — INSULIN GLARGINE 100 [IU]/ML
45 INJECTION, SOLUTION SUBCUTANEOUS AT BEDTIME
Refills: 0 | Status: DISCONTINUED | OUTPATIENT
Start: 2021-04-18 | End: 2021-04-21

## 2021-04-18 RX ORDER — GABAPENTIN 400 MG/1
1 CAPSULE ORAL
Qty: 0 | Refills: 0 | DISCHARGE
Start: 2021-04-18

## 2021-04-18 RX ORDER — ONDANSETRON 8 MG/1
1 TABLET, FILM COATED ORAL
Qty: 0 | Refills: 0 | DISCHARGE
Start: 2021-04-18

## 2021-04-18 RX ORDER — GABAPENTIN 400 MG/1
300 CAPSULE ORAL THREE TIMES A DAY
Refills: 0 | Status: DISCONTINUED | OUTPATIENT
Start: 2021-04-18 | End: 2021-04-21

## 2021-04-18 RX ORDER — ONDANSETRON 8 MG/1
4 TABLET, FILM COATED ORAL EVERY 6 HOURS
Refills: 0 | Status: DISCONTINUED | OUTPATIENT
Start: 2021-04-18 | End: 2021-04-21

## 2021-04-18 RX ORDER — ENOXAPARIN SODIUM 100 MG/ML
45 INJECTION SUBCUTANEOUS
Qty: 15 | Refills: 0
Start: 2021-04-18

## 2021-04-18 RX ADMIN — Medication 2: at 17:19

## 2021-04-18 RX ADMIN — GABAPENTIN 300 MILLIGRAM(S): 400 CAPSULE ORAL at 21:56

## 2021-04-18 RX ADMIN — GABAPENTIN 300 MILLIGRAM(S): 400 CAPSULE ORAL at 06:38

## 2021-04-18 RX ADMIN — LIDOCAINE 1 PATCH: 4 CREAM TOPICAL at 17:21

## 2021-04-18 RX ADMIN — ENOXAPARIN SODIUM 40 MILLIGRAM(S): 100 INJECTION SUBCUTANEOUS at 12:05

## 2021-04-18 RX ADMIN — Medication 15 UNIT(S): at 17:20

## 2021-04-18 RX ADMIN — Medication 81 MILLIGRAM(S): at 12:28

## 2021-04-18 RX ADMIN — Medication 15 UNIT(S): at 12:09

## 2021-04-18 RX ADMIN — PANTOPRAZOLE SODIUM 40 MILLIGRAM(S): 20 TABLET, DELAYED RELEASE ORAL at 06:39

## 2021-04-18 RX ADMIN — ATORVASTATIN CALCIUM 80 MILLIGRAM(S): 80 TABLET, FILM COATED ORAL at 21:56

## 2021-04-18 RX ADMIN — LIDOCAINE 1 PATCH: 4 CREAM TOPICAL at 19:30

## 2021-04-18 RX ADMIN — LISINOPRIL 20 MILLIGRAM(S): 2.5 TABLET ORAL at 17:21

## 2021-04-18 RX ADMIN — CLOPIDOGREL BISULFATE 75 MILLIGRAM(S): 75 TABLET, FILM COATED ORAL at 12:05

## 2021-04-18 RX ADMIN — INSULIN GLARGINE 45 UNIT(S): 100 INJECTION, SOLUTION SUBCUTANEOUS at 22:37

## 2021-04-18 RX ADMIN — Medication 2 GRAM(S): at 06:39

## 2021-04-18 RX ADMIN — LISINOPRIL 20 MILLIGRAM(S): 2.5 TABLET ORAL at 06:39

## 2021-04-18 RX ADMIN — Medication 6: at 12:05

## 2021-04-18 RX ADMIN — Medication 2 GRAM(S): at 17:21

## 2021-04-18 RX ADMIN — Medication 4: at 06:37

## 2021-04-18 RX ADMIN — Medication 15 UNIT(S): at 09:04

## 2021-04-18 NOTE — PROGRESS NOTE ADULT - ASSESSMENT
65 female non compliant t2dm, htn, hld, presented with right sided numbness uncontrolled htn and hyperglycemia.    # Acute Left Medullary Infarct  # Large Vessel Disease  CTH unremarkable  CTA revealed multiple B/L severe focal stenoses prox M2 branch and both MCAs B/L in PCA  MRI showed tiny acute left medullary infarct  lipid profile reviewed, echo reviewed  neuro consult appreciated, cards consult appreciated  continue high dose statin, ASA and Plavix for three weeks (started 4/14/21) then just ASA monotherapy  rule out cardiac source - embolic dz, may need long term monitoring, LON  PMR/PT/OT evaluations noted. acute rehab when pain is better controlled. started on joelle 300 mg HS--> BID, changed to TID today    # T2DM with hyperglycemia uncontrolled  A1c 13.9  increased lantus from 30 to 40 unit.--> 45 unit today  c/w admelog 15 unit TIDAC  changed ISS low to moderate  DM NP cx noted  consistent carb. no evening snack  counselled about uncontrolled DM    # HTN  chronic condition  c/w lisinopril 20 mg daily   monitor BP carefully, continue to titrate as needed    # HLD  chronic condition  chol 221 tri 286 hdl 39 ldl 125  Continue Lipitor, Loveza    # Prophylactic Measure  protonix, lovenox  PT/OT/PMR consulted--> acute rehab once pain controlled    called patient's spouse Arnulfo fragoso [phone] no answer and no option to leave voice message

## 2021-04-18 NOTE — PROGRESS NOTE ADULT - ASSESSMENT
Patient is a 65 year old woman admitted yesterday, 4/13/21 with right face, arm, and leg numbness and tingling. She states yesterday morning noted the numbness and tingling suddenly involving the right face, arm, and leg which has persisted. She also notes HA which is generalized. She also notes lightheadedness on standing. She denies other neurological complaints. She denies fever, chest pain, trauma. Neurological exam as above. Would be concerned with left cerebral ischemia secondary to small vessel disease.    1) CT Head  no hemorrhage. Microvascular ischemic disease.  2) CTA Head and Neck   bilateral M1 branches without flow limiting stenoses. There are severe focal stenoses bilaterally proximal M2 branches MCA                                                 PCA without flow limiting stenoses, severe focal mid right PCA stenosis, and irregularity left PCA                                                 No significant carotid stenosis  3) CT Perfusion  no core infarct, no active ischemia.  4) With evidence of large vessel disease would place on ASA 81 mg and Plavix 75 mg for 3 weeks and then continue ASA 81 mg alone.  5) MRI Head acute left lateral medullary infarct.  6)  Echo .no thrombus.  7)  Cardiology Consult With evidence of multiple stenoses of large vessels as above would consider further evaluation with regard to possible contributing embolic disease including LON and extended cardiac monitoring.       Appreciate Cardiology consult. Consideration of LON and extended cardiac monitoring.

## 2021-04-18 NOTE — PROGRESS NOTE ADULT - SUBJECTIVE AND OBJECTIVE BOX
Patient is a 65 year old woman admitted yesterday 4/13/21 with right face, arm, and leg numbness and tingling. She states yesterday morning noted the numbness and tingling suddenly involving the right face, arm, and leg which has persisted. She also notes HA which is generalized. She also notes lightheadedness on standing. She denies other neurological complaints. She denies fever, chest pain, trauma. Today, Sunday, she states persists with right face, arm, and leg numbness and tingling.     PMH: DM- Dr. Marie Dunne          HTN    SH No allergy    Exam :Awake, alert, appropriate           Pupils 2.5mm, EOM intact, no nystagmus, VFF           CN II-XII intact           Motor tone and strength normal           Gait slow hesitant           Sensation decreased right face, arm, and leg                          13.0   5.32  )-----------( 233      ( 14 Apr 2021 05:45 )             39.0       04-14    136  |  100  |  9   ----------------------------<  366<H>  4.1   |  26  |  0.68    Ca    9.0      14 Apr 2021 05:45    TPro  7.4  /  Alb  3.3  /  TBili  0.3  /  DBili  x   /  AST  29  /  ALT  28  /  AlkPhos  95  04-14    Lipid Profile in AM (04.14.21 @ 05:45)    Cholesterol, Serum: 206 mg/dL    Triglycerides, Serum: 195 mg/dL    HDL Cholesterol, Serum: 37 mg/dL    Non HDL Cholesterol: 169: Patient’s Atherosclerotic Cardiovascular Disease (ASCVD) Risk  Optimal Level (mg/dL)  LDL Cholesterol (LDL-C)  All Patients                                < 100  ASCVD at Very High Risk1    < 70  Non-HDL Cholesterol (Non-HDL-C)  All Patients                        < 130  ASCVD at Very High Risk1   < 100  Non-HDL-Cholesterol (Non-HDL-C) is also a key target for cardiovascular  risk reduction.  Consider Familial Hypercholesterolemia when: LDL-C > 190 mg/dL or  Non-HDL-C > 220 mg/dL.  LDL-C calculation using the Friedewald equation is not provided when  triglycerides > 400 mg/dL, in which case we recommend repeating the test  after fasting, if it was not done before.  When triglycerides >150 mg/dL, calculated LDL-C is provided but may still  be inaccurate (particularly when LDL-C < 70 mg/dL). It can be  recalculated off-line using other equations (e.g. Nahun SS, Leilani SEXTON,  Irish YOUNG, et al.DEE 2013;310:2061 - 8).  1 Bridger Bernstein,et al. "2019 AHA/ACC. . . guideline on the  management of blood cholesterol: a report of the American College of  Cardiology/American Heart Association Task Force on Clinical Practice  Guidelines." Circulation;139:e1082 - e1143.  These values apply only to persons 20 years and older.  Lipid Panel updated with new test, reference ranges and interpretive  comments effective 10-. mg/dL    LDL Cholesterol Calculated: 130 mg/dL    < from: TTE Echo Complete w/o Contrast w/ Doppler (04.14.21 @ 08:52) >    Summary:   1. Left ventricular ejection fraction, by visual estimation, is 60 to 65%.   2. Normal global left ventricular systolic function.   3. Normal left ventricular internal cavity size.   4. Spectral Doppler shows impaired relaxation pattern ofleft ventricular myocardial filling (Grade I diastolic dysfunction).   5. There is mild asymmetric left ventricular hypertrophy.   6. Normal right ventricular size and function.   7. The left atrium is normal in size.   8. Thickening of the anterior and posterior mitral valve leaflets.   9. Trace mitral valve regurgitation.  10. Mild tricuspid regurgitation.  11. Sclerotic aortic valve with normal opening.  12. LA volume Index is 20.7 ml/m² ml/m              < from: CT Brain Stroke Protocol (04.13.21 @ 19:01) >  REBEKAHSON EXAMINATION: None.    FINDINGS:  HEAD CT:  VENTRICLES AND SULCI: Ventricles and sulci are unremarkable for patient age.  INTRA-AXIAL: No intracranial mass, acute hemorrhage, or midline shift is present.There is non-specific decreased attenuation in the white matter likely microvascular disease.  EXTRA-AXIAL: No extra-axial fluid collection is present.  INTRACRANIAL HEMORRHAGE: None.    VISUALIZED SINUSES: No air-fluid levels are identified.  VISUALIZED MASTOIDS:  Clear.  CALVARIUM:  Intact.  MISCELLANEOUS:None.    SOFT TISSUES: Unremarkable.  BONES: Unremarkable.      IMPRESSION:  HEAD CT: Mild volume loss, microvascular disease, no acute hemorrhage or midline shift.    Discussed with Dr. Barakat in the ED at 6:44 PM.          < from: CT Perfusion w/ Maps w/ IV Cont (04.13.21 @ 19:02) >  PARISON EXAMINATION: None.    FINDINGS:    CT RAPID PERFUSION:  CBF<30% volume: 0 ml  Tmax>6.0 s volume: 0 ml  Mismatch volume: 0 ml  Mismatch ratio: none    CORE INFARCT: None.  TISSUE AT RISK: None.  MISMATCH RATIO: None.      CTA [place] OF [place]:  ANTERIOR CIRCULATION  ICA  CAVERNOUS, SUPRACLINOID, BIFURCATION SEGMENTS: Patent without flow limiting stenosis.    ANTERIOR CEREBRAL ARTERIES: Bilateral A1, anterior communicating and A2 anterior cerebral arteries are unremarkable in course and caliber without flow limiting stenosis.    MIDDLE CEREBRAL ARTERIES: Patent bilateral M1 branches without flow limiting stenosis.  There are severe focal stenoses bilaterally in the proximal M2 branches of the MCA.    POSTERIOR CIRCULATION:  VERTEBRAL ARTERIES: Patent without flow limiting stenosis.  BASILAR ARTERY: Patent no flow limiting stenosis.      POSTERIOR CEREBRAL ARTERIES: Patent without flow limiting stenosis. Severe focal stenosis in the mid right PCA and diffuse long segment luminal irregularity in the leftPCA.      CTA NECK:  GREAT VESSELS: Visualized segments are patent, no flow limiting stenosis.    COMMON CAROTID ARTERIES:  RIGHT CCA: Patent without flow limiting stenosis  LEFT CCA: Patent without flow limiting stenosis    CAROTID BULBS:  RIGHT CB:Patent without flow limiting stenosis  LEFT CB: Patent without flow limiting stenosis    INTERNAL CAROTID ARTERIES:  RIGHT ICA: Patent no evidence for any hemodynamically significant stenosis at the ICA origin by NASCET criteria.  LEFT ICA: Patent noevidence for any hemodynamically significant stenosis at the ICA origin by NASCET criteria.    VERTEBRAL ARTERIES:  RIGHT VA: Patent no evidence for any flow limiting stenosis.  LEFT VA: Patent no evidence for any flow limiting stenosis.      SOFT TISSUES: Unremarkable  BONES: Unremarkable        < from: MR Head No Cont (04.15.21 @ 13:39) >    COMPARISON: 7/23/2018 MRI brain, CT head dated/13/2020    TECHNIQUE: MRI brain: Multiplanar, multisequence MR imaging of thebrain are obtained without the administration of intravenous Gadavist contrast.    FINDINGS:  There is punctate focus of abnormal restricted diffusion in the left aspect of the mid to lower on image 8 of series 8.    Scattered periventricular and subcortical white matter T2 /FLAIR hyperintensities are seen without mass effect, nonspecific, likely representing minimal chronic microvascular changes.    Normal T2 flow-voids are seen within  the intracranial vasculature. The lateral ventricles and cortical sulci are age-appropriate in size and configuration. There is no mass, mass effect, or extra-axial fluid collection. There is no susceptibility artifact to suggest hemorrhage. Midline structures are normal.  The visualized paranasal sinuses, mastoid air cells and orbits are unremarkable.    Bilateral screening scalp nodular polypoid likely cutaneous lesions are redemonstrated. Direct visual inspection is recommended. Correlate clinically.    IMPRESSION: Acute tiny left lateral medullary infarct. Correlate clinically.                  IMPRESSION:    CT PERFUSION demonstrated: No core infarct. No active ischemia, despite multiple focal severe stenoses by CTA.  If symptoms persist consider follow up head CT or MRI, MRA  if no contraindication.    CTA COW:  Multiple bilateral severe focal stenoses noted in proximal M2 branches of both MCAs and bilaterally in the PCAs.    CTA NECK: Patent, ECAs, ICAs, no  hemodynamically significant stenosis at  ICA origins by NASCET criteria.  Bilateral vertebral arteries are patent without flow limiting stenosis.     Discussed with Dr. Barakat in the ED at 7:04 PM. MRI may be helpful for further evaluation, if clinically indicated.

## 2021-04-18 NOTE — PROGRESS NOTE ADULT - SUBJECTIVE AND OBJECTIVE BOX
Medicine Progress Note    Patient is a 65y old  Female who presents with a chief complaint of R sided numbness (16 Apr 2021 13:48)      SUBJECTIVE / OVERNIGHT EVENTS:  seen and examined  Chart reviewed  No overnight events    still have RUE and RLE tingling and numbness and burning pain. weakness improving  BM+    ADDITIONAL REVIEW OF SYSTEMS:  no fever/chills/CP/sob/palpitation/dizziness/ abd pain/nausea/vomiting/diarrhea/constipation/headaches. all other ROS neg    MEDICATIONS  (STANDING):  aspirin enteric coated 81 milliGRAM(s) Oral daily  atorvastatin 80 milliGRAM(s) Oral at bedtime  clopidogrel Tablet 75 milliGRAM(s) Oral daily  dextrose 40% Gel 15 Gram(s) Oral once  dextrose 5%. 1000 milliLiter(s) (50 mL/Hr) IV Continuous <Continuous>  dextrose 5%. 1000 milliLiter(s) (100 mL/Hr) IV Continuous <Continuous>  dextrose 50% Injectable 25 Gram(s) IV Push once  dextrose 50% Injectable 12.5 Gram(s) IV Push once  dextrose 50% Injectable 25 Gram(s) IV Push once  enoxaparin Injectable 40 milliGRAM(s) SubCutaneous daily  gabapentin 300 milliGRAM(s) Oral two times a day  glucagon  Injectable 1 milliGRAM(s) IntraMuscular once  insulin glargine Injectable (LANTUS) 40 Unit(s) SubCutaneous at bedtime  insulin lispro (ADMELOG) corrective regimen sliding scale   SubCutaneous three times a day before meals  insulin lispro Injectable (ADMELOG) 15 Unit(s) SubCutaneous before breakfast  insulin lispro Injectable (ADMELOG) 15 Unit(s) SubCutaneous before lunch  insulin lispro Injectable (ADMELOG) 15 Unit(s) SubCutaneous before dinner  lidocaine   Patch 1 Patch Transdermal every 24 hours  lisinopril 20 milliGRAM(s) Oral two times a day  omega-3-Acid Ethyl Esters 2 Gram(s) Oral two times a day  pantoprazole    Tablet 40 milliGRAM(s) Oral before breakfast    MEDICATIONS  (PRN):  acetaminophen   Tablet .. 650 milliGRAM(s) Oral every 6 hours PRN Temp greater or equal to 38C (100.4F), Mild Pain (1 - 3)  ondansetron    Tablet 4 milliGRAM(s) Oral every 6 hours PRN Nausea and/or Vomiting      PHYSICAL EXAM:    Vital Signs Last 24 Hrs  T(C): 36.7 (18 Apr 2021 07:51), Max: 36.7 (18 Apr 2021 05:00)  T(F): 98 (18 Apr 2021 07:51), Max: 98.1 (18 Apr 2021 05:00)  HR: 82 (18 Apr 2021 07:51) (71 - 86)  BP: 136/74 (18 Apr 2021 10:12) (136/74 - 161/80)  BP(mean): --  RR: 16 (18 Apr 2021 07:51) (16 - 17)  SpO2: 99% (18 Apr 2021 07:51) (95% - 99%)    CAPILLARY BLOOD GLUCOSE      POCT Blood Glucose.: 268 mg/dL (18 Apr 2021 12:03)  POCT Blood Glucose.: 221 mg/dL (18 Apr 2021 06:25)  POCT Blood Glucose.: 143 mg/dL (17 Apr 2021 22:25)  POCT Blood Glucose.: 141 mg/dL (17 Apr 2021 17:11)      I&O's Detail    GENERAL: Not in distress. Alert    HEENT:  MMM  NECK: Supple.    CARDIOVASCULAR: RRR S1, S2. No murmur/rubs/gallop  LUNGS: BLAE+, no rales, no wheezing, no rhonchi.    ABDOMEN: ND. Soft,  NT, no guarding / rebound / rigidity. BS normoactive. No CVA tenderness.    BACK: No spine tenderness.  EXTREMITIES: no edema. no leg or calf TP.  SKIN: no rash. or skin break or ulcer on exposed skin. No cellulitis.  NEUROLOGIC: AAO*3.strength is symmetric, sensation reduced on RUE and RLE, speech fluent.    PSYCHIATRIC: Calm.  No agitation.    LABS:                                   13.2   5.11  )-----------( 211      ( 17 Apr 2021 05:30 )             40.7       04-17    138  |  103  |  20  ----------------------------<  314<H>  4.5   |  27  |  0.71    Ca    9.1      17 Apr 2021 05:30                    COVID-19 PCR: NotDetec (13 Apr 2021 19:45)      RADIOLOGY & ADDITIONAL TESTS:  Imaging from Last 24 Hours:    Electrocardiogram/QTc Interval:    COORDINATION OF CARE:  Care Discussed with Consultants/Other Providers:

## 2021-04-19 LAB
GLUCOSE BLDC GLUCOMTR-MCNC: 100 MG/DL — HIGH (ref 70–99)
GLUCOSE BLDC GLUCOMTR-MCNC: 126 MG/DL — HIGH (ref 70–99)
GLUCOSE BLDC GLUCOMTR-MCNC: 161 MG/DL — HIGH (ref 70–99)
GLUCOSE BLDC GLUCOMTR-MCNC: 179 MG/DL — HIGH (ref 70–99)
SARS-COV-2 RNA SPEC QL NAA+PROBE: SIGNIFICANT CHANGE UP

## 2021-04-19 PROCEDURE — 99232 SBSQ HOSP IP/OBS MODERATE 35: CPT

## 2021-04-19 PROCEDURE — 72131 CT LUMBAR SPINE W/O DYE: CPT | Mod: 26

## 2021-04-19 RX ORDER — PANTOPRAZOLE SODIUM 20 MG/1
1 TABLET, DELAYED RELEASE ORAL
Qty: 1 | Refills: 0
Start: 2021-04-19

## 2021-04-19 RX ORDER — TRAMADOL HYDROCHLORIDE 50 MG/1
50 TABLET ORAL EVERY 6 HOURS
Refills: 0 | Status: DISCONTINUED | OUTPATIENT
Start: 2021-04-19 | End: 2021-04-21

## 2021-04-19 RX ORDER — TRAMADOL HYDROCHLORIDE 50 MG/1
25 TABLET ORAL EVERY 6 HOURS
Refills: 0 | Status: DISCONTINUED | OUTPATIENT
Start: 2021-04-19 | End: 2021-04-21

## 2021-04-19 RX ORDER — INSULIN GLARGINE 100 [IU]/ML
30 INJECTION, SOLUTION SUBCUTANEOUS ONCE
Refills: 0 | Status: COMPLETED | OUTPATIENT
Start: 2021-04-19 | End: 2021-04-19

## 2021-04-19 RX ORDER — TRAMADOL HYDROCHLORIDE 50 MG/1
1 TABLET ORAL
Qty: 0 | Refills: 0 | DISCHARGE
Start: 2021-04-19

## 2021-04-19 RX ORDER — TRAMADOL HYDROCHLORIDE 50 MG/1
0.5 TABLET ORAL
Qty: 0 | Refills: 0 | DISCHARGE
Start: 2021-04-19

## 2021-04-19 RX ADMIN — ENOXAPARIN SODIUM 40 MILLIGRAM(S): 100 INJECTION SUBCUTANEOUS at 11:17

## 2021-04-19 RX ADMIN — LISINOPRIL 20 MILLIGRAM(S): 2.5 TABLET ORAL at 06:02

## 2021-04-19 RX ADMIN — LIDOCAINE 1 PATCH: 4 CREAM TOPICAL at 06:00

## 2021-04-19 RX ADMIN — Medication 2 GRAM(S): at 06:02

## 2021-04-19 RX ADMIN — Medication 81 MILLIGRAM(S): at 11:17

## 2021-04-19 RX ADMIN — LIDOCAINE 1 PATCH: 4 CREAM TOPICAL at 17:29

## 2021-04-19 RX ADMIN — GABAPENTIN 300 MILLIGRAM(S): 400 CAPSULE ORAL at 22:20

## 2021-04-19 RX ADMIN — Medication 15 UNIT(S): at 11:17

## 2021-04-19 RX ADMIN — Medication 15 UNIT(S): at 17:07

## 2021-04-19 RX ADMIN — LISINOPRIL 20 MILLIGRAM(S): 2.5 TABLET ORAL at 17:29

## 2021-04-19 RX ADMIN — LIDOCAINE 1 PATCH: 4 CREAM TOPICAL at 18:00

## 2021-04-19 RX ADMIN — ATORVASTATIN CALCIUM 80 MILLIGRAM(S): 80 TABLET, FILM COATED ORAL at 22:31

## 2021-04-19 RX ADMIN — Medication 2: at 08:04

## 2021-04-19 RX ADMIN — Medication 650 MILLIGRAM(S): at 06:14

## 2021-04-19 RX ADMIN — INSULIN GLARGINE 30 UNIT(S): 100 INJECTION, SOLUTION SUBCUTANEOUS at 22:30

## 2021-04-19 RX ADMIN — Medication 2: at 11:16

## 2021-04-19 RX ADMIN — GABAPENTIN 300 MILLIGRAM(S): 400 CAPSULE ORAL at 13:02

## 2021-04-19 RX ADMIN — Medication 15 UNIT(S): at 08:04

## 2021-04-19 RX ADMIN — GABAPENTIN 300 MILLIGRAM(S): 400 CAPSULE ORAL at 06:13

## 2021-04-19 RX ADMIN — PANTOPRAZOLE SODIUM 40 MILLIGRAM(S): 20 TABLET, DELAYED RELEASE ORAL at 06:02

## 2021-04-19 RX ADMIN — Medication 2 GRAM(S): at 17:29

## 2021-04-19 RX ADMIN — CLOPIDOGREL BISULFATE 75 MILLIGRAM(S): 75 TABLET, FILM COATED ORAL at 11:17

## 2021-04-19 NOTE — CHART NOTE - NSCHARTNOTEFT_GEN_A_CORE
Nutrition Follow Up Note  Hospital Course (Per Electronic Medical Record):   Source: Medical Record [X] Patient [X]  Nursing Staff [X]     Diet: Consistent Carbohydrate , DASH/TLC    Patient tolerating diet well appetite is noted excellent , patient calling nutrition office requesting additional menu items, Attempted numerous times  to visit patient to discuss her elevated A1c & to provide education however patient dismissed me due to speaking on telephone , requested that writer return , which writer did 3 times . A1c 13.9% (4/14) Previous RD instructed patient , however was reinforcing diet compliance     Current Weight: 195.3/88.6kg -weight stable since admission , patient could benefit from weight loss regimen     Pertinent Medications: MEDICATIONS  (STANDING):  aspirin enteric coated 81 milliGRAM(s) Oral daily  atorvastatin 80 milliGRAM(s) Oral at bedtime  clopidogrel Tablet 75 milliGRAM(s) Oral daily  dextrose 40% Gel 15 Gram(s) Oral once  dextrose 5%. 1000 milliLiter(s) (50 mL/Hr) IV Continuous <Continuous>  dextrose 5%. 1000 milliLiter(s) (100 mL/Hr) IV Continuous <Continuous>  dextrose 50% Injectable 25 Gram(s) IV Push once  dextrose 50% Injectable 12.5 Gram(s) IV Push once  dextrose 50% Injectable 25 Gram(s) IV Push once  enoxaparin Injectable 40 milliGRAM(s) SubCutaneous daily  gabapentin 300 milliGRAM(s) Oral three times a day  glucagon  Injectable 1 milliGRAM(s) IntraMuscular once  insulin glargine Injectable (LANTUS) 45 Unit(s) SubCutaneous at bedtime  insulin lispro (ADMELOG) corrective regimen sliding scale   SubCutaneous three times a day before meals  insulin lispro Injectable (ADMELOG) 15 Unit(s) SubCutaneous before breakfast  insulin lispro Injectable (ADMELOG) 15 Unit(s) SubCutaneous before lunch  insulin lispro Injectable (ADMELOG) 15 Unit(s) SubCutaneous before dinner  lidocaine   Patch 1 Patch Transdermal every 24 hours  lisinopril 20 milliGRAM(s) Oral two times a day  omega-3-Acid Ethyl Esters 2 Gram(s) Oral two times a day  pantoprazole    Tablet 40 milliGRAM(s) Oral before breakfast    MEDICATIONS  (PRN):  acetaminophen   Tablet .. 650 milliGRAM(s) Oral every 6 hours PRN Temp greater or equal to 38C (100.4F), Mild Pain (1 - 3)  ondansetron    Tablet 4 milliGRAM(s) Oral every 6 hours PRN Nausea and/or Vomiting  traMADol 25 milliGRAM(s) Oral every 6 hours PRN Moderate Pain (4 - 6)  traMADol 50 milliGRAM(s) Oral every 6 hours PRN Severe Pain (7 - 10)      Pertinent Labs:  04-17 Na138 mmol/L Glu 314 mg/dL<H> K+ 4.5 mmol/L Cr  0.71 mg/dL BUN 20 mg/dL 04-15 Alb 3.2 g/dL<L> 04-14 Chol 206 mg/dL<H> LDL --    HDL 37 mg/dL<L> Trig 195 mg/dL<H>  POCT 161,179,149,175      Skin: intact    Edema: none    Last BM: (4/18)     Estimated Needs:   [X] No Change since Previous Assessment     Previous Nutrition Diagnosis: Altered Nutrition related labs     Nutrition Diagnosis is [X] Ongoing         New Nutrition Diagnosis: [X] Not Applicable      Interventions:   1. Recommend continue current diet        Monitoring & Evaluation: will monitor:  [X] Weights   [X] PO Intake   [X] Follow Up (Per Protocol)  [X] Tolerance to Diet Prescription       RD to follow as per Nutrition protocol  Judy Min RDN

## 2021-04-19 NOTE — PROGRESS NOTE ADULT - ASSESSMENT
65 female non compliant t2dm, htn, hld, presented with right sided numbness uncontrolled htn and hyperglycemia.    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 plan for LON on Wednesday 4/21 by Marco A Grier and ILR   PMR/PT/OT evaluations noted. acute rehab when pain is better controlled. started on joelle 300 mg HS--> BID, changed to TID 4/18. tolerating well.   Tramadol, tylenol PRN  Ct LS spine      # T2DM with hyperglycemia uncontrolled  A1c 13.9  increased lantus from 30 to 40 unit.--> 45 unit 4/18  c/w admelog 15 unit TIDAC  changed ISS low to moderate 4/18  DM NP cx noted  consistent carb. no evening snack  counselled about uncontrolled DM    # HTN  chronic condition  c/w lisinopril 20 mg daily   monitor BP carefully, continue to titrate as needed    # HLD  chronic condition  chol 221 tri 286 hdl 39 ldl 125  Continue Lipitor, Loveza    # Prophylactic Measure  protonix, lovenox  PT/OT/PMR consulted--> acute rehab once pain controlled. pain is better controlled. medically stable for AR.covid PCR requested    called patient's spouse Arnulfo fragoso [phone] no answer and no option to leave voice message 65 female non compliant t2dm, htn, hld, presented with right sided numbness uncontrolled htn and hyperglycemia.    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 plan for LON on Wednesday 4/21 by Marco A Grier and ILR   PMR/PT/OT evaluations noted. acute rehab when pain is better controlled. started on joelle 300 mg HS--> BID, changed to TID 4/18. tolerating well.   Tramadol, tylenol PRN  Ct LS spine      # T2DM with hyperglycemia uncontrolled  A1c 13.9  increased lantus from 30 to 40 unit.--> 45 unit 4/18  c/w admelog 15 unit TIDAC  changed ISS low to moderate 4/18  DM NP cx noted  consistent carb. no evening snack  counselled about uncontrolled DM    # HTN  chronic condition  c/w lisinopril 20 mg daily   monitor BP carefully, continue to titrate as needed    # HLD  chronic condition  chol 221 tri 286 hdl 39 ldl 125  Continue Lipitor, Loveza    # Prophylactic Measure  protonix, lovenox  PT/OT/PMR consulted--> acute rehab once pain controlled. pain is better controlled. medically stable for AR.covid PCR requested    updated spouse Arnulfo fragoso [phone].

## 2021-04-19 NOTE — PROGRESS NOTE ADULT - SUBJECTIVE AND OBJECTIVE BOX
Medicine Progress Note    Patient is a 65y old  Female who presents with a chief complaint of R sided numbness (16 Apr 2021 13:48)      SUBJECTIVE / OVERNIGHT EVENTS:  seen and examined  Chart reviewed  No overnight events    RUE and RLE tingling and numbness and burning pain better with joelle. new shooting pain on left foot only. not on leg. no back pain. weakness improving  BM+  no other symptoms    ADDITIONAL REVIEW OF SYSTEMS:  no fever/chills/CP/sob/palpitation/dizziness/ abd pain/nausea/vomiting/diarrhea/constipation/headaches. all other ROS neg    MEDICATIONS  (STANDING):  aspirin enteric coated 81 milliGRAM(s) Oral daily  atorvastatin 80 milliGRAM(s) Oral at bedtime  clopidogrel Tablet 75 milliGRAM(s) Oral daily  dextrose 40% Gel 15 Gram(s) Oral once  dextrose 5%. 1000 milliLiter(s) (50 mL/Hr) IV Continuous <Continuous>  dextrose 5%. 1000 milliLiter(s) (100 mL/Hr) IV Continuous <Continuous>  dextrose 50% Injectable 25 Gram(s) IV Push once  dextrose 50% Injectable 12.5 Gram(s) IV Push once  dextrose 50% Injectable 25 Gram(s) IV Push once  enoxaparin Injectable 40 milliGRAM(s) SubCutaneous daily  gabapentin 300 milliGRAM(s) Oral three times a day  glucagon  Injectable 1 milliGRAM(s) IntraMuscular once  insulin glargine Injectable (LANTUS) 45 Unit(s) SubCutaneous at bedtime  insulin lispro (ADMELOG) corrective regimen sliding scale   SubCutaneous three times a day before meals  insulin lispro Injectable (ADMELOG) 15 Unit(s) SubCutaneous before breakfast  insulin lispro Injectable (ADMELOG) 15 Unit(s) SubCutaneous before lunch  insulin lispro Injectable (ADMELOG) 15 Unit(s) SubCutaneous before dinner  lidocaine   Patch 1 Patch Transdermal every 24 hours  lisinopril 20 milliGRAM(s) Oral two times a day  omega-3-Acid Ethyl Esters 2 Gram(s) Oral two times a day  pantoprazole    Tablet 40 milliGRAM(s) Oral before breakfast    MEDICATIONS  (PRN):  acetaminophen   Tablet .. 650 milliGRAM(s) Oral every 6 hours PRN Temp greater or equal to 38C (100.4F), Mild Pain (1 - 3)  ondansetron    Tablet 4 milliGRAM(s) Oral every 6 hours PRN Nausea and/or Vomiting        PHYSICAL EXAM:    Vital Signs Last 24 Hrs  T(C): 36.8 (19 Apr 2021 10:10), Max: 36.8 (19 Apr 2021 10:10)  T(F): 98.2 (19 Apr 2021 10:10), Max: 98.2 (19 Apr 2021 10:10)  HR: 76 (19 Apr 2021 10:10) (76 - 98)  BP: 152/78 (19 Apr 2021 10:10) (124/74 - 152/78)  BP(mean): --  RR: 16 (19 Apr 2021 10:10) (16 - 17)  SpO2: 97% (19 Apr 2021 10:10) (96% - 98%)    CAPILLARY BLOOD GLUCOSE      POCT Blood Glucose.: 161 mg/dL (19 Apr 2021 11:00)  POCT Blood Glucose.: 179 mg/dL (19 Apr 2021 07:57)  POCT Blood Glucose.: 149 mg/dL (18 Apr 2021 21:55)  POCT Blood Glucose.: 175 mg/dL (18 Apr 2021 17:14)  POCT Blood Glucose.: 268 mg/dL (18 Apr 2021 12:03)        I&O's Detail    GENERAL: Not in distress. Alert    HEENT:  MMM  NECK: Supple.    CARDIOVASCULAR: RRR S1, S2. No murmur/rubs/gallop  LUNGS: BLAE+, no rales, no wheezing, no rhonchi.    ABDOMEN: ND. Soft,  NT, no guarding / rebound / rigidity. BS normoactive. No CVA tenderness.    BACK: No spine tenderness.  EXTREMITIES: no edema. no leg or calf TP.  SKIN: no rash.   NEUROLOGIC: AAO*3.strength is symmetric, sensation reduced on RUE and RLE, speech fluent.    PSYCHIATRIC: Calm.  No agitation.    LABS:                                   13.2   5.11  )-----------( 211      ( 17 Apr 2021 05:30 )             40.7       04-17    138  |  103  |  20  ----------------------------<  314<H>  4.5   |  27  |  0.71    Ca    9.1      17 Apr 2021 05:30                    COVID-19 PCR: NotDetec (13 Apr 2021 19:45)      RADIOLOGY & ADDITIONAL TESTS:  Imaging from Last 24 Hours:    Electrocardiogram/QTc Interval:    COORDINATION OF CARE:  Care Discussed with Consultants/Other Providers:

## 2021-04-19 NOTE — PROGRESS NOTE ADULT - ASSESSMENT
Patient is a 65 year old woman admitted yesterday, 4/13/21 with right face, arm, and leg numbness and tingling. She states yesterday morning noted the numbness and tingling suddenly involving the right face, arm, and leg which has persisted. She also notes HA which is generalized. She also notes lightheadedness on standing. She denies other neurological complaints. She denies fever, chest pain, trauma. Neurological exam as above. Would be concerned with left cerebral ischemia secondary to small vessel disease. 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    1) CT Head  no hemorrhage. Microvascular ischemic disease.  2) CTA Head and Neck   bilateral M1 branches without flow limiting stenoses. There are severe focal stenoses bilaterally proximal M2 branches MCA                                                 PCA without flow limiting stenoses, severe focal mid right PCA stenosis, and irregularity left PCA                                                 No significant carotid stenosis  3) CT Perfusion  no core infarct, no active ischemia.  4) With evidence of large vessel disease would place on ASA 81 mg and Plavix 75 mg for 3 weeks and then continue ASA 81 mg alone.  5) MRI Head acute left lateral medullary infarct.  6)  Echo .no thrombus.  7)  Cardiology Consult With evidence of multiple stenoses of large vessels as above would consider further evaluation with regard to possible contributing embolic disease including LON and extended cardiac monitoring.       Appreciate Cardiology consult. Consideration of LON and extended cardiac monitoring.

## 2021-04-19 NOTE — PROGRESS NOTE ADULT - SUBJECTIVE AND OBJECTIVE BOX
Patient is a 65 year old woman admitted yesterday 4/13/21 with right face, arm, and leg numbness and tingling. She states yesterday morning noted the numbness and tingling suddenly involving the right face, arm, and leg which has persisted. She also notes HA which is generalized. She also notes lightheadedness on standing. She denies other neurological complaints. She denies fever, chest pain, trauma. Today, Monday,, she states persists with right face, arm, and leg numbness and tingling but less marked involving the right face and arm. She states has begun to note tingling of the tips of her left toes. She denies other neurological complaints. She denies left leg pain.    PMH: DM- Dr. Marie Dunne          HTN    SH No allergy    Exam :Awake, alert, appropriate           CN II-XII intact           Motor tone and strength normal           Gait slow hesitant            Sensation decreased right face, arm, and leg                       Lipid Profile in AM (04.14.21 @ 05:45)    Cholesterol, Serum: 206 mg/dL    Triglycerides, Serum: 195 mg/dL    HDL Cholesterol, Serum: 37 mg/dL    Non HDL Cholesterol: 169: Patient’s Atherosclerotic Cardiovascular Disease (ASCVD) Risk  Optimal Level (mg/dL)  LDL Cholesterol (LDL-C)  All Patients                                < 100  ASCVD at Very High Risk1    < 70  Non-HDL Cholesterol (Non-HDL-C)  All Patients                        < 130  ASCVD at Very High Risk1   < 100  Non-HDL-Cholesterol (Non-HDL-C) is also a key target for cardiovascular  risk reduction.  Consider Familial Hypercholesterolemia when: LDL-C > 190 mg/dL or  Non-HDL-C > 220 mg/dL.  LDL-C calculation using the Friedewald equation is not provided when  triglycerides > 400 mg/dL, in which case we recommend repeating the test  after fasting, if it was not done before.  When triglycerides >150 mg/dL, calculated LDL-C is provided but may still  be inaccurate (particularly when LDL-C < 70 mg/dL). It can be  recalculated off-line using other equations (e.g. Nahun SS, Leilani SEXTON,  Irish YOUNG, et al.DEE 2013;310:2061 - 8).  1 Bridger Bernstein.,et al. "2019 AHA/ACC. . . guideline on the  management of blood cholesterol: a report of the American College of  Cardiology/American Heart Association Task Force on Clinical Practice  Guidelines." Circulation;139:e1082 - e1143.  These values apply only to persons 20 years and older.  Lipid Panel updated with new test, reference ranges and interpretive  comments effective 10-. mg/dL    LDL Cholesterol Calculated: 130 mg/dL    < from: TTE Echo Complete w/o Contrast w/ Doppler (04.14.21 @ 08:52) >    Summary:   1. Left ventricular ejection fraction, by visual estimation, is 60 to 65%.   2. Normal global left ventricular systolic function.   3. Normal left ventricular internal cavity size.   4. Spectral Doppler shows impaired relaxation pattern ofleft ventricular myocardial filling (Grade I diastolic dysfunction).   5. There is mild asymmetric left ventricular hypertrophy.   6. Normal right ventricular size and function.   7. The left atrium is normal in size.   8. Thickening of the anterior and posterior mitral valve leaflets.   9. Trace mitral valve regurgitation.  10. Mild tricuspid regurgitation.  11. Sclerotic aortic valve with normal opening.  12. LA volume Index is 20.7 ml/m² ml/m              < from: CT Brain Stroke Protocol (04.13.21 @ 19:01) >  ZAHEER EXAMINATION: None.    FINDINGS:  HEAD CT:  VENTRICLES AND SULCI: Ventricles and sulci are unremarkable for patient age.  INTRA-AXIAL: No intracranial mass, acute hemorrhage, or midline shift is present.There is non-specific decreased attenuation in the white matter likely microvascular disease.  EXTRA-AXIAL: No extra-axial fluid collection is present.  INTRACRANIAL HEMORRHAGE: None.    VISUALIZED SINUSES: No air-fluid levels are identified.  VISUALIZED MASTOIDS:  Clear.  CALVARIUM:  Intact.  MISCELLANEOUS:None.    SOFT TISSUES: Unremarkable.  BONES: Unremarkable.      IMPRESSION:  HEAD CT: Mild volume loss, microvascular disease, no acute hemorrhage or midline shift.    Discussed with Dr. Barakat in the ED at 6:44 PM.          < from: CT Perfusion w/ Maps w/ IV Cont (04.13.21 @ 19:02) >  PARISON EXAMINATION: None.    FINDINGS:    CT RAPID PERFUSION:  CBF<30% volume: 0 ml  Tmax>6.0 s volume: 0 ml  Mismatch volume: 0 ml  Mismatch ratio: none    CORE INFARCT: None.  TISSUE AT RISK: None.  MISMATCH RATIO: None.      CTA [place]:  ANTERIOR CIRCULATION  ICA  CAVERNOUS, SUPRACLINOID, BIFURCATION SEGMENTS: Patent without flow limiting stenosis.    ANTERIOR CEREBRAL ARTERIES: Bilateral A1, anterior communicating and A2 anterior cerebral arteries are unremarkable in course and caliber without flow limiting stenosis.    MIDDLE CEREBRAL ARTERIES: Patent bilateral M1 branches without flow limiting stenosis.  There are severe focal stenoses bilaterally in the proximal M2 branches of the MCA.    POSTERIOR CIRCULATION:  VERTEBRAL ARTERIES: Patent without flow limiting stenosis.  BASILAR ARTERY: Patent no flow limiting stenosis.      POSTERIOR CEREBRAL ARTERIES: Patent without flow limiting stenosis. Severe focal stenosis in the mid right PCA and diffuse long segment luminal irregularity in the leftPCA.      CTA NECK:  GREAT VESSELS: Visualized segments are patent, no flow limiting stenosis.    COMMON CAROTID ARTERIES:  RIGHT CCA: Patent without flow limiting stenosis  LEFT CCA: Patent without flow limiting stenosis    CAROTID BULBS:  RIGHT CB:Patent without flow limiting stenosis  LEFT CB: Patent without flow limiting stenosis    INTERNAL CAROTID ARTERIES:  RIGHT ICA: Patent no evidence for any hemodynamically significant stenosis at the ICA origin by NASCET criteria.  LEFT ICA: Patent noevidence for any hemodynamically significant stenosis at the ICA origin by NASCET criteria.    VERTEBRAL ARTERIES:  RIGHT VA: Patent no evidence for any flow limiting stenosis.  LEFT VA: Patent no evidence for any flow limiting stenosis.      SOFT TISSUES: Unremarkable  BONES: Unremarkable        < from: MR Head No Cont (04.15.21 @ 13:39) >    COMPARISON: 7/23/2018 MRI brain, CT head dated/13/2020    TECHNIQUE: MRI brain: Multiplanar, multisequence MR imaging of thebrain are obtained without the administration of intravenous Gadavist contrast.    FINDINGS:  There is punctate focus of abnormal restricted diffusion in the left aspect of the mid to lower on image 8 of series 8.    Scattered periventricular and subcortical white matter T2 /FLAIR hyperintensities are seen without mass effect, nonspecific, likely representing minimal chronic microvascular changes.    Normal T2 flow-voids are seen within  the intracranial vasculature. The lateral ventricles and cortical sulci are age-appropriate in size and configuration. There is no mass, mass effect, or extra-axial fluid collection. There is no susceptibility artifact to suggest hemorrhage. Midline structures are normal.  The visualized paranasal sinuses, mastoid air cells and orbits are unremarkable.    Bilateral screening scalp nodular polypoid likely cutaneous lesions are redemonstrated. Direct visual inspection is recommended. Correlate clinically.    IMPRESSION: Acute tiny left lateral medullary infarct. Correlate clinically.                  IMPRESSION:    CT PERFUSION demonstrated: No core infarct. No active ischemia, despite multiple focal severe stenoses by CTA.  If symptoms persist consider follow up head CT or MRI, MRA  if no contraindication.    CTA COW:  Multiple bilateral severe focal stenoses noted in proximal M2 branches of both MCAs and bilaterally in the PCAs.    CTA NECK: Patent, ECAs, ICAs, no  hemodynamically significant stenosis at  ICA origins by NASCET criteria.  Bilateral vertebral arteries are patent without flow limiting stenosis.     Discussed with Dr. Barakat in the ED at 7:04 PM. MRI may be helpful for further evaluation, if clinically indicated.

## 2021-04-20 LAB
GLUCOSE BLDC GLUCOMTR-MCNC: 130 MG/DL — HIGH (ref 70–99)
GLUCOSE BLDC GLUCOMTR-MCNC: 158 MG/DL — HIGH (ref 70–99)
GLUCOSE BLDC GLUCOMTR-MCNC: 177 MG/DL — HIGH (ref 70–99)
GLUCOSE BLDC GLUCOMTR-MCNC: 184 MG/DL — HIGH (ref 70–99)

## 2021-04-20 PROCEDURE — 99232 SBSQ HOSP IP/OBS MODERATE 35: CPT

## 2021-04-20 RX ORDER — INSULIN GLARGINE 100 [IU]/ML
22 INJECTION, SOLUTION SUBCUTANEOUS ONCE
Refills: 0 | Status: COMPLETED | OUTPATIENT
Start: 2021-04-20 | End: 2021-04-20

## 2021-04-20 RX ADMIN — Medication 2 GRAM(S): at 05:52

## 2021-04-20 RX ADMIN — Medication 2: at 17:20

## 2021-04-20 RX ADMIN — Medication 15 UNIT(S): at 17:20

## 2021-04-20 RX ADMIN — ENOXAPARIN SODIUM 40 MILLIGRAM(S): 100 INJECTION SUBCUTANEOUS at 11:52

## 2021-04-20 RX ADMIN — GABAPENTIN 300 MILLIGRAM(S): 400 CAPSULE ORAL at 22:53

## 2021-04-20 RX ADMIN — CLOPIDOGREL BISULFATE 75 MILLIGRAM(S): 75 TABLET, FILM COATED ORAL at 11:52

## 2021-04-20 RX ADMIN — PANTOPRAZOLE SODIUM 40 MILLIGRAM(S): 20 TABLET, DELAYED RELEASE ORAL at 05:50

## 2021-04-20 RX ADMIN — GABAPENTIN 300 MILLIGRAM(S): 400 CAPSULE ORAL at 05:49

## 2021-04-20 RX ADMIN — LIDOCAINE 1 PATCH: 4 CREAM TOPICAL at 22:54

## 2021-04-20 RX ADMIN — LISINOPRIL 20 MILLIGRAM(S): 2.5 TABLET ORAL at 05:50

## 2021-04-20 RX ADMIN — GABAPENTIN 300 MILLIGRAM(S): 400 CAPSULE ORAL at 13:00

## 2021-04-20 RX ADMIN — Medication 650 MILLIGRAM(S): at 12:50

## 2021-04-20 RX ADMIN — Medication 2 GRAM(S): at 17:21

## 2021-04-20 RX ADMIN — LISINOPRIL 20 MILLIGRAM(S): 2.5 TABLET ORAL at 17:21

## 2021-04-20 RX ADMIN — Medication 15 UNIT(S): at 07:58

## 2021-04-20 RX ADMIN — Medication 15 UNIT(S): at 11:52

## 2021-04-20 RX ADMIN — LIDOCAINE 1 PATCH: 4 CREAM TOPICAL at 17:22

## 2021-04-20 RX ADMIN — Medication 81 MILLIGRAM(S): at 11:52

## 2021-04-20 RX ADMIN — Medication 650 MILLIGRAM(S): at 11:50

## 2021-04-20 RX ADMIN — ATORVASTATIN CALCIUM 80 MILLIGRAM(S): 80 TABLET, FILM COATED ORAL at 22:53

## 2021-04-20 RX ADMIN — Medication 2: at 07:59

## 2021-04-20 RX ADMIN — LIDOCAINE 1 PATCH: 4 CREAM TOPICAL at 05:57

## 2021-04-20 RX ADMIN — INSULIN GLARGINE 22 UNIT(S): 100 INJECTION, SOLUTION SUBCUTANEOUS at 23:25

## 2021-04-20 NOTE — PROGRESS NOTE ADULT - SUBJECTIVE AND OBJECTIVE BOX
Medicine Progress Note    Patient is a 65y old  Female who presents with a chief complaint of R sided numbness (16 Apr 2021 13:48)      SUBJECTIVE / OVERNIGHT EVENTS:  seen and examined  Chart reviewed  No overnight events    RUE and RLE tingling and numbness and burning pain improved significantly with joelle. left foot pain is better as well.   for LON tomorrow  BM+  no other symptoms    ADDITIONAL REVIEW OF SYSTEMS:  no fever/chills/CP/sob/palpitation/dizziness/ abd pain/nausea/vomiting/diarrhea/constipation/headaches. all other ROS neg    MEDICATIONS  (STANDING):  aspirin enteric coated 81 milliGRAM(s) Oral daily  atorvastatin 80 milliGRAM(s) Oral at bedtime  clopidogrel Tablet 75 milliGRAM(s) Oral daily  dextrose 40% Gel 15 Gram(s) Oral once  dextrose 5%. 1000 milliLiter(s) (50 mL/Hr) IV Continuous <Continuous>  dextrose 5%. 1000 milliLiter(s) (100 mL/Hr) IV Continuous <Continuous>  dextrose 50% Injectable 25 Gram(s) IV Push once  dextrose 50% Injectable 12.5 Gram(s) IV Push once  dextrose 50% Injectable 25 Gram(s) IV Push once  enoxaparin Injectable 40 milliGRAM(s) SubCutaneous daily  gabapentin 300 milliGRAM(s) Oral three times a day  glucagon  Injectable 1 milliGRAM(s) IntraMuscular once  insulin glargine Injectable (LANTUS) 45 Unit(s) SubCutaneous at bedtime  insulin lispro (ADMELOG) corrective regimen sliding scale   SubCutaneous three times a day before meals  insulin lispro Injectable (ADMELOG) 15 Unit(s) SubCutaneous before breakfast  insulin lispro Injectable (ADMELOG) 15 Unit(s) SubCutaneous before lunch  insulin lispro Injectable (ADMELOG) 15 Unit(s) SubCutaneous before dinner  lidocaine   Patch 1 Patch Transdermal every 24 hours  lisinopril 20 milliGRAM(s) Oral two times a day  omega-3-Acid Ethyl Esters 2 Gram(s) Oral two times a day  pantoprazole    Tablet 40 milliGRAM(s) Oral before breakfast    MEDICATIONS  (PRN):  acetaminophen   Tablet .. 650 milliGRAM(s) Oral every 6 hours PRN Temp greater or equal to 38C (100.4F), Mild Pain (1 - 3)  ondansetron    Tablet 4 milliGRAM(s) Oral every 6 hours PRN Nausea and/or Vomiting  traMADol 25 milliGRAM(s) Oral every 6 hours PRN Moderate Pain (4 - 6)  traMADol 50 milliGRAM(s) Oral every 6 hours PRN Severe Pain (7 - 10)        PHYSICAL EXAM:    Vital Signs Last 24 Hrs  T(C): 36.8 (20 Apr 2021 05:44), Max: 37.2 (19 Apr 2021 19:58)  T(F): 98.3 (20 Apr 2021 05:44), Max: 98.9 (19 Apr 2021 19:58)  HR: 83 (20 Apr 2021 05:44) (78 - 88)  BP: 145/74 (20 Apr 2021 05:44) (142/86 - 148/88)  BP(mean): --  RR: 16 (20 Apr 2021 05:44) (15 - 16)  SpO2: 97% (20 Apr 2021 05:44) (97% - 99%)    I&O's Detail    19 Apr 2021 07:01  -  20 Apr 2021 07:00  --------------------------------------------------------  IN:    Oral Fluid: 500 mL  Total IN: 500 mL    OUT:  Total OUT: 0 mL    Total NET: 500 mL      GENERAL: Not in distress. Alert    HEENT:  MMM  NECK: Supple.    CARDIOVASCULAR: RRR S1, S2. No murmur/rubs/gallop  LUNGS: BLAE+, no rales, no wheezing, no rhonchi.    ABDOMEN: ND. Soft,  NT, no guarding / rebound / rigidity. BS normoactive. No CVA tenderness.    MSK: no Spine TP  EXTREMITIES: no edema. no leg or calf TP.  SKIN: no rash.   NEUROLOGIC: AAO*3.strength is symmetric, sensation now reported normal except reduced sensation on right foot and left great toe, speech fluent.    PSYCHIATRIC: Calm.  No agitation.    LABS:                                   13.2   5.11  )-----------( 211      ( 17 Apr 2021 05:30 )             40.7       04-17    138  |  103  |  20  ----------------------------<  314<H>  4.5   |  27  |  0.71    Ca    9.1      17 Apr 2021 05:30      COVID-19 PCR: Deandra (13 Apr 2021 19:45)      RADIOLOGY & ADDITIONAL TESTS:  Imaging from Last 24 Hours:    Electrocardiogram/QTc Interval:    COORDINATION OF CARE:  Care Discussed with Consultants/Other Providers:

## 2021-04-20 NOTE — PROGRESS NOTE ADULT - ASSESSMENT
Patient is a 65 year old woman admitted yesterday, 4/13/21 with right face, arm, and leg numbness and tingling. She states yesterday morning noted the numbness and tingling suddenly involving the right face, arm, and leg which has persisted. She also notes HA which is generalized. She also notes lightheadedness on standing. She denies other neurological complaints. She denies fever, chest pain, trauma. Neurological exam as above. Would be concerned with left cerebral ischemia secondary to small vessel disease. 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    1) CT Head  no hemorrhage. Microvascular ischemic disease.  2) CTA Head and Neck   bilateral M1 branches without flow limiting stenoses. There are severe focal stenoses bilaterally proximal M2 branches MCA                                                 PCA without flow limiting stenoses, severe focal mid right PCA stenosis, and irregularity left PCA                                                 No significant carotid stenosis  3) CT Perfusion  no core infarct, no active ischemia.  4) With evidence of large vessel disease would place on ASA 81 mg and Plavix 75 mg for 3 weeks and then continue ASA 81 mg alone.  5) MRI Head acute left lateral medullary infarct.  6)  Echo .no thrombus.  7)  Cardiology Consult With evidence of multiple stenoses of large vessels as above would consider further evaluation with regard to possible contributing embolic disease including LON and extended cardiac monitoring.  8) For LON with Cardiology tomorrow, 4/21/21       Appreciate Cardiology consult. Consideration of LON and extended cardiac monitoring.

## 2021-04-20 NOTE — PROGRESS NOTE ADULT - ASSESSMENT
65 female non compliant t2dm, htn, hld, presented with right sided numbness uncontrolled htn and hyperglycemia.    # Acute Left Medullary Infarct  # Large Vessel Disease  # Neuropathic pain b/l LE, likely combination of CVA and DM neuropathy  CTH unremarkable  CTA revealed multiple B/L severe focal stenoses prox M2 branch and both MCAs B/L in PCA  MRI showed tiny acute left medullary infarct  lipid profile reviewed, echo reviewed  neuro consult appreciated, cards consult appreciated  continue high dose statin,   c/w ASA and Plavix for three weeks (started 4/14/21) then just ASA monotherapy  rule out cardiac source - embolic dz, arrythmia. plan for LON on Wednesday 4/21 by Marco A Grier and ILR   PMR/PT/OT evaluations noted. acute rehab when pain is better controlled. started on joelle 300 mg HS--> BID, changed to TID 4/18. tolerating well. pain improved  Tramadol, tylenol PRN  Ct LS spine- DJD. no significant stenosis  For LON tomorrow.  NPO after midnight      # T2DM with hyperglycemia uncontrolled  A1c 13.9  increased lantus from 30 to 40 unit.--> 45 unit 4/18. BS better controlled. will reduce lantus to 40 unit at HS  c/w admelog 15 unit TIDAC  changed ISS low to moderate 4/18  DM NP cx noted  consistent carb. no evening snack  counselled about uncontrolled DM    # HTN  chronic condition  c/w lisinopril 20 mg daily . cahnegd to 20 mg BID 4/17  monitor BP and titrate as needed    # HLD  chronic condition  chol 221 tri 286 hdl 39 ldl 125  Continue Lipitor, Loveza    # Prophylactic Measure  protonix, lovenox    Dispo: [facility] accepted for LON tomorrow. not on tele monitor      updated spouse Arnulfo fragoso [phone].

## 2021-04-20 NOTE — PROGRESS NOTE ADULT - SUBJECTIVE AND OBJECTIVE BOX
Patient is a 65 year old woman admitted yesterday 4/13/21 with right face, arm, and leg numbness and tingling. She states yesterday morning noted the numbness and tingling suddenly involving the right face, arm, and leg which has persisted. She also notes HA which is generalized. She also notes lightheadedness on standing. She denies other neurological complaints. She denies fever, chest pain, trauma. Today, Tuesday,, she states persists with right face, arm, and leg numbness and tingling but less marked involving the right face and arm. She states also less tingling of the tips of her left toes. She denies other neurological complaints. She denies left leg pain.    PMH: DM- Dr. Marie Dunne          HTN    SH No allergy    Exam :Awake, alert, appropriate           CN II-XII intact           Motor tone and strength normal           Gait slow hesitant            Sensation decreased right face, arm, and leg                       Lipid Profile in AM (04.14.21 @ 05:45)    Cholesterol, Serum: 206 mg/dL    Triglycerides, Serum: 195 mg/dL    HDL Cholesterol, Serum: 37 mg/dL    Non HDL Cholesterol: 169: Patient’s Atherosclerotic Cardiovascular Disease (ASCVD) Risk  Optimal Level (mg/dL)  LDL Cholesterol (LDL-C)  All Patients                                < 100  ASCVD at Very High Risk1    < 70  Non-HDL Cholesterol (Non-HDL-C)  All Patients                        < 130  ASCVD at Very High Risk1   < 100  Non-HDL-Cholesterol (Non-HDL-C) is also a key target for cardiovascular  risk reduction.  Consider Familial Hypercholesterolemia when: LDL-C > 190 mg/dL or  Non-HDL-C > 220 mg/dL.  LDL-C calculation using the Friedewald equation is not provided when  triglycerides > 400 mg/dL, in which case we recommend repeating the test  after fasting, if it was not done before.  When triglycerides >150 mg/dL, calculated LDL-C is provided but may still  be inaccurate (particularly when LDL-C < 70 mg/dL). It can be  recalculated off-line using other equations (e.g. Nahun SS, Leilani SEXTON,  Irish YOUNG, et al.DEE 2013;310:2061 - 8).  1 Bridger Bernstein.,et al. "2019 AHA/ACC. . . guideline on the  management of blood cholesterol: a report of the American College of  Cardiology/American Heart Association Task Force on Clinical Practice  Guidelines." Circulation;139:e1082 - e1143.  These values apply only to persons 20 years and older.  Lipid Panel updated with new test, reference ranges and interpretive  comments effective 10-. mg/dL    LDL Cholesterol Calculated: 130 mg/dL    < from: TTE Echo Complete w/o Contrast w/ Doppler (04.14.21 @ 08:52) >    Summary:   1. Left ventricular ejection fraction, by visual estimation, is 60 to 65%.   2. Normal global left ventricular systolic function.   3. Normal left ventricular internal cavity size.   4. Spectral Doppler shows impaired relaxation pattern ofleft ventricular myocardial filling (Grade I diastolic dysfunction).   5. There is mild asymmetric left ventricular hypertrophy.   6. Normal right ventricular size and function.   7. The left atrium is normal in size.   8. Thickening of the anterior and posterior mitral valve leaflets.   9. Trace mitral valve regurgitation.  10. Mild tricuspid regurgitation.  11. Sclerotic aortic valve with normal opening.  12. LA volume Index is 20.7 ml/m² ml/m              < from: CT Brain Stroke Protocol (04.13.21 @ 19:01) >  ZAHEER EXAMINATION: None.    FINDINGS:  HEAD CT:  VENTRICLES AND SULCI: Ventricles and sulci are unremarkable for patient age.  INTRA-AXIAL: No intracranial mass, acute hemorrhage, or midline shift is present.There is non-specific decreased attenuation in the white matter likely microvascular disease.  EXTRA-AXIAL: No extra-axial fluid collection is present.  INTRACRANIAL HEMORRHAGE: None.    VISUALIZED SINUSES: No air-fluid levels are identified.  VISUALIZED MASTOIDS:  Clear.  CALVARIUM:  Intact.  MISCELLANEOUS:None.    SOFT TISSUES: Unremarkable.  BONES: Unremarkable.      IMPRESSION:  HEAD CT: Mild volume loss, microvascular disease, no acute hemorrhage or midline shift.    Discussed with Dr. Barakat in the ED at 6:44 PM.          < from: CT Perfusion w/ Maps w/ IV Cont (04.13.21 @ 19:02) >  PARISON EXAMINATION: None.    FINDINGS:    CT RAPID PERFUSION:  CBF<30% volume: 0 ml  Tmax>6.0 s volume: 0 ml  Mismatch volume: 0 ml  Mismatch ratio: none    CORE INFARCT: None.  TISSUE AT RISK: None.  MISMATCH RATIO: None.      CTA [place] OF [place]:  ANTERIOR CIRCULATION  ICA  CAVERNOUS, SUPRACLINOID, BIFURCATION SEGMENTS: Patent without flow limiting stenosis.    ANTERIOR CEREBRAL ARTERIES: Bilateral A1, anterior communicating and A2 anterior cerebral arteries are unremarkable in course and caliber without flow limiting stenosis.    MIDDLE CEREBRAL ARTERIES: Patent bilateral M1 branches without flow limiting stenosis.  There are severe focal stenoses bilaterally in the proximal M2 branches of the MCA.    POSTERIOR CIRCULATION:  VERTEBRAL ARTERIES: Patent without flow limiting stenosis.  BASILAR ARTERY: Patent no flow limiting stenosis.      POSTERIOR CEREBRAL ARTERIES: Patent without flow limiting stenosis. Severe focal stenosis in the mid right PCA and diffuse long segment luminal irregularity in the leftPCA.      CTA NECK:  GREAT VESSELS: Visualized segments are patent, no flow limiting stenosis.    COMMON CAROTID ARTERIES:  RIGHT CCA: Patent without flow limiting stenosis  LEFT CCA: Patent without flow limiting stenosis    CAROTID BULBS:  RIGHT CB:Patent without flow limiting stenosis  LEFT CB: Patent without flow limiting stenosis    INTERNAL CAROTID ARTERIES:  RIGHT ICA: Patent no evidence for any hemodynamically significant stenosis at the ICA origin by NASCET criteria.  LEFT ICA: Patent noevidence for any hemodynamically significant stenosis at the ICA origin by NASCET criteria.    VERTEBRAL ARTERIES:  RIGHT VA: Patent no evidence for any flow limiting stenosis.  LEFT VA: Patent no evidence for any flow limiting stenosis.      SOFT TISSUES: Unremarkable  BONES: Unremarkable        < from: MR Head No Cont (04.15.21 @ 13:39) >    COMPARISON: 7/23/2018 MRI brain, CT head dated/13/2020    TECHNIQUE: MRI brain: Multiplanar, multisequence MR imaging of thebrain are obtained without the administration of intravenous Gadavist contrast.    FINDINGS:  There is punctate focus of abnormal restricted diffusion in the left aspect of the mid to lower on image 8 of series 8.    Scattered periventricular and subcortical white matter T2 /FLAIR hyperintensities are seen without mass effect, nonspecific, likely representing minimal chronic microvascular changes.    Normal T2 flow-voids are seen within  the intracranial vasculature. The lateral ventricles and cortical sulci are age-appropriate in size and configuration. There is no mass, mass effect, or extra-axial fluid collection. There is no susceptibility artifact to suggest hemorrhage. Midline structures are normal.  The visualized paranasal sinuses, mastoid air cells and orbits are unremarkable.    Bilateral screening scalp nodular polypoid likely cutaneous lesions are redemonstrated. Direct visual inspection is recommended. Correlate clinically.    IMPRESSION: Acute tiny left lateral medullary infarct. Correlate clinically.                  IMPRESSION:    CT PERFUSION demonstrated: No core infarct. No active ischemia, despite multiple focal severe stenoses by CTA.  If symptoms persist consider follow up head CT or MRI, MRA  if no contraindication.    CTA COW:  Multiple bilateral severe focal stenoses noted in proximal M2 branches of both MCAs and bilaterally in the PCAs.    CTA NECK: Patent, ECAs, ICAs, no  hemodynamically significant stenosis at  ICA origins by NASCET criteria.  Bilateral vertebral arteries are patent without flow limiting stenosis.     Discussed with Dr. Barakat in the ED at 7:04 PM. MRI may be helpful for further evaluation, if clinically indicated.

## 2021-04-21 ENCOUNTER — INPATIENT (INPATIENT)
Facility: HOSPITAL | Age: 66
LOS: 9 days | Discharge: HOME | DRG: 950 | End: 2021-05-01
Attending: STUDENT IN AN ORGANIZED HEALTH CARE EDUCATION/TRAINING PROGRAM | Admitting: STUDENT IN AN ORGANIZED HEALTH CARE EDUCATION/TRAINING PROGRAM
Payer: COMMERCIAL

## 2021-04-21 ENCOUNTER — TRANSCRIPTION ENCOUNTER (OUTPATIENT)
Age: 66
End: 2021-04-21

## 2021-04-21 VITALS
DIASTOLIC BLOOD PRESSURE: 83 MMHG | TEMPERATURE: 99 F | SYSTOLIC BLOOD PRESSURE: 114 MMHG | HEART RATE: 87 BPM | OXYGEN SATURATION: 99 % | RESPIRATION RATE: 15 BRPM

## 2021-04-21 VITALS
HEART RATE: 78 BPM | DIASTOLIC BLOOD PRESSURE: 81 MMHG | TEMPERATURE: 98 F | OXYGEN SATURATION: 100 % | HEIGHT: 60.7 IN | WEIGHT: 174.61 LBS | RESPIRATION RATE: 16 BRPM | SYSTOLIC BLOOD PRESSURE: 152 MMHG

## 2021-04-21 DIAGNOSIS — Z51.89 ENCOUNTER FOR OTHER SPECIFIED AFTERCARE: ICD-10-CM

## 2021-04-21 DIAGNOSIS — I63.9 CEREBRAL INFARCTION, UNSPECIFIED: ICD-10-CM

## 2021-04-21 DIAGNOSIS — R20.2 PARESTHESIA OF SKIN: ICD-10-CM

## 2021-04-21 DIAGNOSIS — E11.649 TYPE 2 DIABETES MELLITUS WITH HYPOGLYCEMIA WITHOUT COMA: ICD-10-CM

## 2021-04-21 DIAGNOSIS — R11.0 NAUSEA: ICD-10-CM

## 2021-04-21 DIAGNOSIS — R42 DIZZINESS AND GIDDINESS: ICD-10-CM

## 2021-04-21 DIAGNOSIS — Z98.890 OTHER SPECIFIED POSTPROCEDURAL STATES: Chronic | ICD-10-CM

## 2021-04-21 DIAGNOSIS — I69.391 DYSPHAGIA FOLLOWING CEREBRAL INFARCTION: ICD-10-CM

## 2021-04-21 DIAGNOSIS — H55.81 DEFICIENT SACCADIC EYE MOVEMENTS: ICD-10-CM

## 2021-04-21 DIAGNOSIS — I69.392 FACIAL WEAKNESS FOLLOWING CEREBRAL INFARCTION: ICD-10-CM

## 2021-04-21 DIAGNOSIS — I63.59 CEREBRAL INFARCTION DUE TO UNSPECIFIED OCCLUSION OR STENOSIS OF OTHER CEREBRAL ARTERY: ICD-10-CM

## 2021-04-21 DIAGNOSIS — I69.322 DYSARTHRIA FOLLOWING CEREBRAL INFARCTION: ICD-10-CM

## 2021-04-21 DIAGNOSIS — I10 ESSENTIAL (PRIMARY) HYPERTENSION: ICD-10-CM

## 2021-04-21 DIAGNOSIS — R26.9 UNSPECIFIED ABNORMALITIES OF GAIT AND MOBILITY: ICD-10-CM

## 2021-04-21 DIAGNOSIS — M79.2 NEURALGIA AND NEURITIS, UNSPECIFIED: ICD-10-CM

## 2021-04-21 DIAGNOSIS — R51.9 HEADACHE, UNSPECIFIED: ICD-10-CM

## 2021-04-21 DIAGNOSIS — E78.5 HYPERLIPIDEMIA, UNSPECIFIED: ICD-10-CM

## 2021-04-21 DIAGNOSIS — R79.89 OTHER SPECIFIED ABNORMAL FINDINGS OF BLOOD CHEMISTRY: ICD-10-CM

## 2021-04-21 LAB
ANION GAP SERPL CALC-SCNC: 7 MMOL/L — SIGNIFICANT CHANGE UP (ref 5–17)
BUN SERPL-MCNC: 19 MG/DL — SIGNIFICANT CHANGE UP (ref 7–23)
CALCIUM SERPL-MCNC: 9.5 MG/DL — SIGNIFICANT CHANGE UP (ref 8.4–10.5)
CHLORIDE SERPL-SCNC: 104 MMOL/L — SIGNIFICANT CHANGE UP (ref 96–108)
CO2 SERPL-SCNC: 28 MMOL/L — SIGNIFICANT CHANGE UP (ref 22–31)
CREAT SERPL-MCNC: 0.72 MG/DL — SIGNIFICANT CHANGE UP (ref 0.5–1.3)
FOLATE SERPL-MCNC: 7.7 NG/ML — SIGNIFICANT CHANGE UP
GLUCOSE BLDC GLUCOMTR-MCNC: 165 MG/DL — HIGH (ref 70–99)
GLUCOSE BLDC GLUCOMTR-MCNC: 171 MG/DL — HIGH (ref 70–99)
GLUCOSE BLDC GLUCOMTR-MCNC: 190 MG/DL — HIGH (ref 70–99)
GLUCOSE BLDC GLUCOMTR-MCNC: 85 MG/DL — SIGNIFICANT CHANGE UP (ref 70–99)
GLUCOSE BLDC GLUCOMTR-MCNC: 95 MG/DL — SIGNIFICANT CHANGE UP (ref 70–99)
GLUCOSE BLDC GLUCOMTR-MCNC: 99 MG/DL — SIGNIFICANT CHANGE UP (ref 70–99)
GLUCOSE BLDC GLUCOMTR-MCNC: 99 MG/DL — SIGNIFICANT CHANGE UP (ref 70–99)
GLUCOSE SERPL-MCNC: 217 MG/DL — HIGH (ref 70–99)
HCT VFR BLD CALC: 40.1 % — SIGNIFICANT CHANGE UP (ref 34.5–45)
HGB BLD-MCNC: 12.9 G/DL — SIGNIFICANT CHANGE UP (ref 11.5–15.5)
INR BLD: 1.15 RATIO — SIGNIFICANT CHANGE UP (ref 0.88–1.16)
MCHC RBC-ENTMCNC: 28 PG — SIGNIFICANT CHANGE UP (ref 27–34)
MCHC RBC-ENTMCNC: 32.2 GM/DL — SIGNIFICANT CHANGE UP (ref 32–36)
MCV RBC AUTO: 87 FL — SIGNIFICANT CHANGE UP (ref 80–100)
NRBC # BLD: 0 /100 WBCS — SIGNIFICANT CHANGE UP (ref 0–0)
PLATELET # BLD AUTO: 203 K/UL — SIGNIFICANT CHANGE UP (ref 150–400)
POTASSIUM SERPL-MCNC: 4.5 MMOL/L — SIGNIFICANT CHANGE UP (ref 3.5–5.3)
POTASSIUM SERPL-SCNC: 4.5 MMOL/L — SIGNIFICANT CHANGE UP (ref 3.5–5.3)
PROTHROM AB SERPL-ACNC: 13.8 SEC — HIGH (ref 10.6–13.6)
RBC # BLD: 4.61 M/UL — SIGNIFICANT CHANGE UP (ref 3.8–5.2)
RBC # FLD: 12.9 % — SIGNIFICANT CHANGE UP (ref 10.3–14.5)
SODIUM SERPL-SCNC: 139 MMOL/L — SIGNIFICANT CHANGE UP (ref 135–145)
VIT B12 SERPL-MCNC: 272 PG/ML — SIGNIFICANT CHANGE UP (ref 232–1245)
WBC # BLD: 5.9 K/UL — SIGNIFICANT CHANGE UP (ref 3.8–10.5)
WBC # FLD AUTO: 5.9 K/UL — SIGNIFICANT CHANGE UP (ref 3.8–10.5)

## 2021-04-21 PROCEDURE — 71045 X-RAY EXAM CHEST 1 VIEW: CPT

## 2021-04-21 PROCEDURE — 85730 THROMBOPLASTIN TIME PARTIAL: CPT

## 2021-04-21 PROCEDURE — 97535 SELF CARE MNGMENT TRAINING: CPT

## 2021-04-21 PROCEDURE — 99223 1ST HOSP IP/OBS HIGH 75: CPT

## 2021-04-21 PROCEDURE — 93320 DOPPLER ECHO COMPLETE: CPT | Mod: 26

## 2021-04-21 PROCEDURE — 82607 VITAMIN B-12: CPT

## 2021-04-21 PROCEDURE — 70450 CT HEAD/BRAIN W/O DYE: CPT

## 2021-04-21 PROCEDURE — 97110 THERAPEUTIC EXERCISES: CPT

## 2021-04-21 PROCEDURE — 86769 SARS-COV-2 COVID-19 ANTIBODY: CPT

## 2021-04-21 PROCEDURE — 86803 HEPATITIS C AB TEST: CPT

## 2021-04-21 PROCEDURE — 85027 COMPLETE CBC AUTOMATED: CPT

## 2021-04-21 PROCEDURE — 85025 COMPLETE CBC W/AUTO DIFF WBC: CPT

## 2021-04-21 PROCEDURE — 99239 HOSP IP/OBS DSCHRG MGMT >30: CPT

## 2021-04-21 PROCEDURE — 80053 COMPREHEN METABOLIC PANEL: CPT

## 2021-04-21 PROCEDURE — C8925: CPT

## 2021-04-21 PROCEDURE — 82746 ASSAY OF FOLIC ACID SERUM: CPT

## 2021-04-21 PROCEDURE — 84443 ASSAY THYROID STIM HORMONE: CPT

## 2021-04-21 PROCEDURE — 82803 BLOOD GASES ANY COMBINATION: CPT

## 2021-04-21 PROCEDURE — 99285 EMERGENCY DEPT VISIT HI MDM: CPT | Mod: 25

## 2021-04-21 PROCEDURE — 93312 ECHO TRANSESOPHAGEAL: CPT | Mod: 26

## 2021-04-21 PROCEDURE — 97166 OT EVAL MOD COMPLEX 45 MIN: CPT

## 2021-04-21 PROCEDURE — 70496 CT ANGIOGRAPHY HEAD: CPT

## 2021-04-21 PROCEDURE — 80048 BASIC METABOLIC PNL TOTAL CA: CPT

## 2021-04-21 PROCEDURE — 84484 ASSAY OF TROPONIN QUANT: CPT

## 2021-04-21 PROCEDURE — 36415 COLL VENOUS BLD VENIPUNCTURE: CPT

## 2021-04-21 PROCEDURE — 85610 PROTHROMBIN TIME: CPT

## 2021-04-21 PROCEDURE — 0042T: CPT

## 2021-04-21 PROCEDURE — 93325 DOPPLER ECHO COLOR FLOW MAPG: CPT | Mod: 26

## 2021-04-21 PROCEDURE — 87635 SARS-COV-2 COVID-19 AMP PRB: CPT

## 2021-04-21 PROCEDURE — 36000 PLACE NEEDLE IN VEIN: CPT

## 2021-04-21 PROCEDURE — 70498 CT ANGIOGRAPHY NECK: CPT

## 2021-04-21 PROCEDURE — 93306 TTE W/DOPPLER COMPLETE: CPT

## 2021-04-21 PROCEDURE — 93005 ELECTROCARDIOGRAM TRACING: CPT

## 2021-04-21 PROCEDURE — 82962 GLUCOSE BLOOD TEST: CPT

## 2021-04-21 PROCEDURE — 80061 LIPID PANEL: CPT

## 2021-04-21 PROCEDURE — 70551 MRI BRAIN STEM W/O DYE: CPT

## 2021-04-21 PROCEDURE — 83036 HEMOGLOBIN GLYCOSYLATED A1C: CPT

## 2021-04-21 PROCEDURE — 72131 CT LUMBAR SPINE W/O DYE: CPT

## 2021-04-21 RX ORDER — ENOXAPARIN SODIUM 100 MG/ML
40 INJECTION SUBCUTANEOUS DAILY
Refills: 0 | Status: DISCONTINUED | OUTPATIENT
Start: 2021-04-21 | End: 2021-05-01

## 2021-04-21 RX ORDER — GABAPENTIN 400 MG/1
300 CAPSULE ORAL THREE TIMES A DAY
Refills: 0 | Status: DISCONTINUED | OUTPATIENT
Start: 2021-04-21 | End: 2021-04-23

## 2021-04-21 RX ORDER — DEXTROSE 50 % IN WATER 50 %
25 SYRINGE (ML) INTRAVENOUS ONCE
Refills: 0 | Status: DISCONTINUED | OUTPATIENT
Start: 2021-04-21 | End: 2021-05-01

## 2021-04-21 RX ORDER — TRAMADOL HYDROCHLORIDE 50 MG/1
25 TABLET ORAL EVERY 6 HOURS
Refills: 0 | Status: DISCONTINUED | OUTPATIENT
Start: 2021-04-21 | End: 2021-04-26

## 2021-04-21 RX ORDER — INSULIN GLARGINE 100 [IU]/ML
35 INJECTION, SOLUTION SUBCUTANEOUS AT BEDTIME
Refills: 0 | Status: DISCONTINUED | OUTPATIENT
Start: 2021-04-21 | End: 2021-04-28

## 2021-04-21 RX ORDER — INSULIN LISPRO 100/ML
VIAL (ML) SUBCUTANEOUS
Refills: 0 | Status: DISCONTINUED | OUTPATIENT
Start: 2021-04-21 | End: 2021-05-01

## 2021-04-21 RX ORDER — INSULIN LISPRO 100/ML
15 VIAL (ML) SUBCUTANEOUS
Refills: 0 | Status: DISCONTINUED | OUTPATIENT
Start: 2021-04-21 | End: 2021-04-23

## 2021-04-21 RX ORDER — ONDANSETRON 8 MG/1
4 TABLET, FILM COATED ORAL EVERY 6 HOURS
Refills: 0 | Status: DISCONTINUED | OUTPATIENT
Start: 2021-04-21 | End: 2021-05-01

## 2021-04-21 RX ORDER — PANTOPRAZOLE SODIUM 20 MG/1
40 TABLET, DELAYED RELEASE ORAL
Refills: 0 | Status: DISCONTINUED | OUTPATIENT
Start: 2021-04-21 | End: 2021-05-01

## 2021-04-21 RX ORDER — ENOXAPARIN SODIUM 100 MG/ML
40 INJECTION SUBCUTANEOUS
Qty: 15 | Refills: 0
Start: 2021-04-21

## 2021-04-21 RX ORDER — SODIUM CHLORIDE 9 MG/ML
1000 INJECTION, SOLUTION INTRAVENOUS
Refills: 0 | Status: DISCONTINUED | OUTPATIENT
Start: 2021-04-21 | End: 2021-05-01

## 2021-04-21 RX ORDER — LISINOPRIL 2.5 MG/1
20 TABLET ORAL
Refills: 0 | Status: DISCONTINUED | OUTPATIENT
Start: 2021-04-21 | End: 2021-05-01

## 2021-04-21 RX ORDER — ACETAMINOPHEN 500 MG
650 TABLET ORAL EVERY 6 HOURS
Refills: 0 | Status: DISCONTINUED | OUTPATIENT
Start: 2021-04-21 | End: 2021-05-01

## 2021-04-21 RX ORDER — CLOPIDOGREL BISULFATE 75 MG/1
75 TABLET, FILM COATED ORAL DAILY
Refills: 0 | Status: DISCONTINUED | OUTPATIENT
Start: 2021-04-21 | End: 2021-05-01

## 2021-04-21 RX ORDER — OMEGA-3 ACID ETHYL ESTERS 1 G
2 CAPSULE ORAL
Refills: 0 | Status: DISCONTINUED | OUTPATIENT
Start: 2021-04-21 | End: 2021-05-01

## 2021-04-21 RX ORDER — INSULIN LISPRO 100/ML
VIAL (ML) SUBCUTANEOUS AT BEDTIME
Refills: 0 | Status: DISCONTINUED | OUTPATIENT
Start: 2021-04-21 | End: 2021-05-01

## 2021-04-21 RX ORDER — INSULIN GLARGINE 100 [IU]/ML
45 INJECTION, SOLUTION SUBCUTANEOUS AT BEDTIME
Refills: 0 | Status: DISCONTINUED | OUTPATIENT
Start: 2021-04-21 | End: 2021-04-21

## 2021-04-21 RX ORDER — TRAMADOL HYDROCHLORIDE 50 MG/1
50 TABLET ORAL EVERY 6 HOURS
Refills: 0 | Status: DISCONTINUED | OUTPATIENT
Start: 2021-04-21 | End: 2021-04-26

## 2021-04-21 RX ORDER — DEXTROSE 50 % IN WATER 50 %
15 SYRINGE (ML) INTRAVENOUS ONCE
Refills: 0 | Status: DISCONTINUED | OUTPATIENT
Start: 2021-04-21 | End: 2021-05-01

## 2021-04-21 RX ORDER — LIDOCAINE 4 G/100G
1 CREAM TOPICAL EVERY 24 HOURS
Refills: 0 | Status: DISCONTINUED | OUTPATIENT
Start: 2021-04-21 | End: 2021-05-01

## 2021-04-21 RX ORDER — SENNA PLUS 8.6 MG/1
2 TABLET ORAL AT BEDTIME
Refills: 0 | Status: DISCONTINUED | OUTPATIENT
Start: 2021-04-21 | End: 2021-05-01

## 2021-04-21 RX ORDER — ASPIRIN/CALCIUM CARB/MAGNESIUM 324 MG
81 TABLET ORAL DAILY
Refills: 0 | Status: DISCONTINUED | OUTPATIENT
Start: 2021-04-21 | End: 2021-05-01

## 2021-04-21 RX ORDER — INSULIN GLARGINE 100 [IU]/ML
35 INJECTION, SOLUTION SUBCUTANEOUS ONCE
Refills: 0 | Status: COMPLETED | OUTPATIENT
Start: 2021-04-21 | End: 2021-04-21

## 2021-04-21 RX ORDER — ATORVASTATIN CALCIUM 80 MG/1
80 TABLET, FILM COATED ORAL AT BEDTIME
Refills: 0 | Status: DISCONTINUED | OUTPATIENT
Start: 2021-04-21 | End: 2021-05-01

## 2021-04-21 RX ORDER — GLUCAGON INJECTION, SOLUTION 0.5 MG/.1ML
1 INJECTION, SOLUTION SUBCUTANEOUS ONCE
Refills: 0 | Status: DISCONTINUED | OUTPATIENT
Start: 2021-04-21 | End: 2021-05-01

## 2021-04-21 RX ORDER — DEXTROSE 50 % IN WATER 50 %
12.5 SYRINGE (ML) INTRAVENOUS ONCE
Refills: 0 | Status: DISCONTINUED | OUTPATIENT
Start: 2021-04-21 | End: 2021-05-01

## 2021-04-21 RX ORDER — INSULIN LISPRO 100/ML
12 VIAL (ML) SUBCUTANEOUS
Qty: 15 | Refills: 0
Start: 2021-04-21

## 2021-04-21 RX ADMIN — ENOXAPARIN SODIUM 40 MILLIGRAM(S): 100 INJECTION SUBCUTANEOUS at 14:54

## 2021-04-21 RX ADMIN — LIDOCAINE 1 PATCH: 4 CREAM TOPICAL at 06:25

## 2021-04-21 RX ADMIN — PANTOPRAZOLE SODIUM 40 MILLIGRAM(S): 20 TABLET, DELAYED RELEASE ORAL at 05:25

## 2021-04-21 RX ADMIN — Medication 2 GRAM(S): at 05:25

## 2021-04-21 RX ADMIN — Medication 2 GRAM(S): at 17:31

## 2021-04-21 RX ADMIN — Medication 2: at 08:22

## 2021-04-21 RX ADMIN — CLOPIDOGREL BISULFATE 75 MILLIGRAM(S): 75 TABLET, FILM COATED ORAL at 14:54

## 2021-04-21 RX ADMIN — INSULIN GLARGINE 35 UNIT(S): 100 INJECTION, SOLUTION SUBCUTANEOUS at 23:41

## 2021-04-21 RX ADMIN — Medication 81 MILLIGRAM(S): at 14:54

## 2021-04-21 RX ADMIN — GABAPENTIN 300 MILLIGRAM(S): 400 CAPSULE ORAL at 14:54

## 2021-04-21 RX ADMIN — Medication 2: at 17:11

## 2021-04-21 RX ADMIN — GABAPENTIN 300 MILLIGRAM(S): 400 CAPSULE ORAL at 22:15

## 2021-04-21 RX ADMIN — GABAPENTIN 300 MILLIGRAM(S): 400 CAPSULE ORAL at 05:25

## 2021-04-21 RX ADMIN — LISINOPRIL 20 MILLIGRAM(S): 2.5 TABLET ORAL at 05:25

## 2021-04-21 RX ADMIN — LISINOPRIL 20 MILLIGRAM(S): 2.5 TABLET ORAL at 17:31

## 2021-04-21 RX ADMIN — LIDOCAINE 1 PATCH: 4 CREAM TOPICAL at 17:31

## 2021-04-21 RX ADMIN — ATORVASTATIN CALCIUM 80 MILLIGRAM(S): 80 TABLET, FILM COATED ORAL at 22:15

## 2021-04-21 RX ADMIN — Medication 15 UNIT(S): at 17:11

## 2021-04-21 NOTE — H&P ADULT - HISTORY OF PRESENT ILLNESS
65F hx of HTN, HLD, T2DM off meds since 2018 and has not followed up with PMD since then presenting with R sided numbness. Pt related she has not felt well which started about 4 days ago and felt her balance was off and occasional dizziness. Today after waking up, she took a shower at 11AM and at the time noticed exquisite painful sensitivity in the R lower extremity and throughout the day noted numbness in the RLE and RUE and R side of face and even on the R torso. She went to get a pedicure at around 2 PM and noticed again exquisite painful sensitivity in the R foot when placed in water. She did not have slurring of speech, visual issues, dysphagia, or focal weakness. She went to see PMD and advised ED visit to r/o CVA. In ED, out of window for tPA. Afebrile BP: 187/104 P: 74 sat 100% on RA. CT head and CT angio neg for acute infarct but with focal stenosis in bl M2 MCA and bl PCA. Blood glucose was 511. S/p IV Labetalol 20mg in ED and 6U regular insulin. Pt feels well otherwise - wanted to sign out AMA because she didn't want to be moved upstairs where her mother had passed.     CTH unremarkable. 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 CTA revealed multiple B/L severe focal stenoses prox M2 branch and both MCAs B/L in PCA. As per neuro, continue high dose statin, ASA and Plavix for three weeks (started 4/14/21) then just ASA monotherapy. Cardio rec LON, done 4/21/21 which showed no cardiac source of embolism.    Patient was medically stable for discharge to [place] for acute rehab 4/22/21.   65F hx of HTN, HLD, T2DM off meds since 2018 and has not followed up with PMD since then presenting with R sided numbness. Pt related she has not felt well which started about 4 days ago and felt her balance was off and occasional dizziness. Today after waking up, she took a shower at 11AM and at the time noticed exquisite painful sensitivity in the R lower extremity and throughout the day noted numbness in the RLE and RUE and R side of face and even on the R torso. She went to get a pedicure at around 2 PM and noticed again exquisite painful sensitivity in the R foot when placed in water. She did not have slurring of speech, visual issues, dysphagia, or focal weakness. She went to see PMD and advised ED visit to r/o CVA. In ED, out of window for tPA. Afebrile BP: 187/104 P: 74 sat 100% on RA. CT head and CT angio neg for acute infarct but with focal stenosis in bl M2 MCA and bl PCA. Blood glucose was 511. S/p IV Labetalol 20mg in ED and 6U regular insulin. Pt feels well otherwise - wanted to sign out AMA because she didn't want to be moved upstairs where her mother had passed.     CTH unremarkable. 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 CTA revealed multiple B/L severe focal stenoses prox M2 branch and both MCAs B/L in PCA. As per neuro, continue high dose statin, ASA and Plavix for three weeks (started 4/14/21) then just ASA monotherapy. Cardio rec LON, done 4/21/21 which showed no cardiac source of embolism.    Patient was medically stable for discharge to [place] for acute rehab 4/21/21.

## 2021-04-21 NOTE — H&P ADULT - NSHPSOCIALHISTORY_GEN_ALL_CORE
SOCIAL HISTORY  Smoking - Denied  EtOH - Denied   Drugs - Denied    FUNCTIONAL HISTORY  As per pt, pt lives in a private split ranch house with 10 steps with 1 HR to front door and 8 steps up/down with 1 handrail inside. Pt reports having a tub shower at home. Pt reports having no DME and was independent in all ADLs/IADLs prior to hospitalization. Pt reports family is not home during the day to assist in ADLs    CURRENT FUNCTIONAL STATUS  - Bed Mobility: supervision  - Transfers: CG  - Gait: CG 25' RW  - ADLs: supervision SOCIAL HISTORY  Smoking - Denied  EtOH - Denied   Drugs - Denied    FUNCTIONAL HISTORY  As per pt, pt lives in a private split ranch house with daughter in [place] with 10 steps with 1 HR to front door and 8 steps up/down with 1 handrail inside. Pt reports having a tub shower at home. Pt reports having no DME and was independent in all ADLs/IADLs prior to hospitalization. 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

## 2021-04-21 NOTE — PROGRESS NOTE ADULT - ASSESSMENT
65 female non compliant t2dm, htn, hld, presented with right sided numbness uncontrolled htn and hyperglycemia.    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 plan for LON on Wednesday 4/21 by Marco A Grier and ILR   PMR/PT/OT evaluations noted. acute rehab when pain is better controlled. started on joelle 300 mg HS--> BID, changed to TID 4/18. tolerating well. pain improved  Tramadol, tylenol PRN  Ct LS spine- DJD. no significant stenosis  Awaiting LON Dr. Strickland  NPO after midnight    # T2DM with hyperglycemia uncontrolled  A1c 13.9 4/14  increased lantus from 30 to 40 unit.--> 45 unit 4/18. BS better controlled. will reduce lantus to 40 unit at HS  c/w admelog 15 unit TIDAC  changed ISS low to moderate 4/18  DM NP cx noted  consistent carb. no evening snack  counselled about uncontrolled DM    # HTN  chronic condition  c/w lisinopril 20 mg daily . cahnegd to 20 mg BID 4/17  monitor BP and titrate as needed    # HLD  chronic condition  chol 221 tri 286 hdl 39 ldl 125: 4/14  Continue Lipitor, Loveza    # Prophylactic Measure  protonix, lovenox    Dispo: Awaiting LON Dr. Strickland.  will update spouse Arnulfo fragoso [phone].  65 female non compliant t2dm, htn, hld, presented with right sided numbness uncontrolled htn and hyperglycemia.    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 plan for OLN on Wednesday 4/21 by Marco A Grier and ILR   PMR/PT/OT evaluations noted. acute rehab when pain is better controlled. started on joelle 300 mg HS--> BID, changed to TID 4/18. tolerating well. pain improved  Tramadol, tylenol PRN  Ct LS spine- DJD. no significant stenosis  Awaiting LON Dr. Strickland  NPO after midnight    # T2DM with hyperglycemia uncontrolled  A1c 13.9 4/14  increased lantus from 30 to 40 unit.--> 45 unit 4/18. BS better controlled. will reduce lantus to 40 unit at HS  c/w admelog 15 unit TIDAC  changed ISS low to moderate 4/18  DM NP cx noted  consistent carb. no evening snack  counselled about uncontrolled DM    # HTN  chronic condition  c/w lisinopril 20 mg daily . cahnegd to 20 mg BID 4/17  monitor BP and titrate as needed    # HLD  chronic condition  chol 221 tri 286 hdl 39 ldl 125: 4/14  Continue Lipitor, Loveza    #DVT Prophylactic Measure  - lovenox    - protonix,    Dispo: Awaiting LON Dr. Strickland.  will update spouse Arnulfo fragoso [phone].  65 female non compliant t2dm, htn, hld, presented with right sided numbness uncontrolled htn and hyperglycemia.    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 plan for LON on Wednesday 4/21 by Marco A Grier and ILR   PMR/PT/OT evaluations noted. acute rehab when pain is better controlled. started on joelle 300 mg HS--> BID, changed to TID 4/18. tolerating well. pain improved  Tramadol, tylenol PRN  Ct LS spine- DJD. no significant stenosis   LON Dr. Strickland: no evidence of PFO or ASD, no intracardiac thrombus, no vegetations visualized   NPO after midnight    # T2DM with hyperglycemia uncontrolled  A1c 13.9 4/14  increased lantus from 30 to 40 unit.--> 45 unit 4/18. BS better controlled. will reduce lantus to 40 unit at HS  c/w admelog 15 unit TIDAC  changed ISS low to moderate 4/18  DM NP cx noted  consistent carb. no evening snack  counselled about uncontrolled DM    # HTN  chronic condition  c/w lisinopril 20 mg daily . cahnegd to 20 mg BID 4/17  monitor BP and titrate as needed    # HLD  chronic condition  chol 221 tri 286 hdl 39 ldl 125: 4/14  Continue Lipitor, Loveza    #DVT Prophylactic Measure  - lovenox    - protonix,    Dispo: D/C to acute rehab   will update spouse Arnulfo fragoso [phone].  65 female non compliant t2dm, htn, hld, presented with right sided numbness uncontrolled htn and hyperglycemia.    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 plan for LON on Wednesday 4/21 by Marco A Grier and ILR   PMR/PT/OT evaluations noted. acute rehab when pain is better controlled. started on joelle 300 mg HS--> BID, changed to TID 4/18. tolerating well. pain improved  Tramadol, tylenol PRN  Ct LS spine- DJD. no significant stenosis   LON Dr. Strickland: no evidence of PFO or ASD, no intracardiac thrombus, no vegetations visualized   NPO after midnight    # T2DM with hyperglycemia uncontrolled  A1c 13.9 4/14  increased lantus from 30 to 40 unit.--> 45 unit 4/18. BS better controlled. will reduce lantus to 40 unit at HS  c/w admelog 15 unit TIDAC  changed ISS low to moderate 4/18  DM NP cx noted  consistent carb. no evening snack  counselled about uncontrolled DM    # HTN  chronic condition  c/w lisinopril 20 mg daily . cahnegd to 20 mg BID 4/17  monitor BP and titrate as needed    # HLD  chronic condition  chol 221 tri 286 hdl 39 ldl 125: 4/14  Continue Lipitor, Loveza    #DVT Prophylactic Measure  - lovenox    - protonix,    Dispo: D/C to acute rehab   updated  Arnulfo fragoso [phone].

## 2021-04-21 NOTE — H&P ADULT - ATTENDING COMMENTS
Pt. seen with resident.  Agree with documentation above as per resident with amendments made as appropriate. Patient medically stable. appropriate for acute rehabilitation.    65 y.o. 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

## 2021-04-21 NOTE — H&P ADULT - NSHPREVIEWOFSYSTEMS_GEN_ALL_CORE
REVIEW OF SYSTEMS  Constitutional: No fever, No Chills, No fatigue  HEENT: No eye pain, No visual disturbances, No difficulty hearing, No Dysphagia   Pulm: No cough,  No shortness of breath  Cardio: No chest pain, No palpitations  GI:  No abdominal pain, No nausea, No vomiting, No diarrhea, No constipation, No incontinence  : No dysuria, No frequency, No hematuria, No incontinence  Neuro: No headaches, No memory loss, +loss of strength, No numbness, No tremors  Skin: No itching, No rashes, No lesions   Endo: No temperature intolerance  MSK: +shock like pain  Psych:  No depression, No anxiety REVIEW OF SYSTEMS  Constitutional: No fever, No Chills, No fatigue  HEENT: No eye pain, No visual disturbances, No difficulty hearing, No Dysphagia   Pulm: No cough,  No shortness of breath  Cardio: No chest pain, No palpitations  GI:  No abdominal pain, No nausea, No vomiting, No diarrhea, No constipation, No incontinence  : No dysuria, No frequency, No hematuria, No incontinence  Neuro: No headaches, No memory loss, +loss of strength, No numbness, No tremors  Skin: No itching, No rashes, No lesions   Endo: No temperature intolerance  MSK: +shock like pain in right leg  Psych:  No depression, No anxiety REVIEW OF SYSTEMS  Constitutional: No fever, No Chills, No fatigue  HEENT: No eye pain, No visual disturbances, No difficulty hearing, No Dysphagia   Pulm: No cough,  No shortness of breath  Cardio: No chest pain, No palpitations  GI:  No abdominal pain, No nausea, No vomiting, No diarrhea, No constipation, No incontinence  : No dysuria, No frequency, No hematuria, No incontinence  Neuro: + headaches, No memory loss, +loss of strength, No numbness, No tremors  Skin: No itching, No rashes, No lesions   Endo: No temperature intolerance  MSK: +shock like pain in right leg  Psych:  No depression, No anxiety

## 2021-04-21 NOTE — H&P ADULT - NSHPLABSRESULTS_GEN_ALL_CORE
RECENT LABS                        12.9   5.90  )-----------( 203      ( 21 Apr 2021 07:30 )             40.1     04-21    139  |  104  |  19  ----------------------------<  217<H>  4.5   |  28  |  0.72    Ca    9.5      21 Apr 2021 07:30          CAPILLARY BLOOD GLUCOSE      POCT Blood Glucose.: 165 mg/dL (21 Apr 2021 11:03)  POCT Blood Glucose.: 190 mg/dL (21 Apr 2021 07:30)  POCT Blood Glucose.: 184 mg/dL (20 Apr 2021 22:26)  POCT Blood Glucose.: 177 mg/dL (20 Apr 2021 17:18)      IMAGING    < from: MR Head No Cont (04.15.21 @ 13:39) >    IMPRESSION: Acute tiny left lateral medullary infarct. Correlate clinically.    < end of copied text >    < from: Xray Chest 1 View AP/PA (04.13.21 @ 19:06) >    IMPRESSION: Heart enlargement.    < end of copied text >    < from: CT Angio Neck w/ IV Cont (04.13.21 @ 19:02) >    IMPRESSION:    CT PERFUSION demonstrated: No core infarct. No active ischemia, despite multiple focal severe stenoses by CTA.  If symptoms persist consider follow up head CT or MRI, MRA  if no contraindication.    CTA COW:  Multiple bilateral severe focal stenoses noted in proximal M2 branches of both MCAs and bilaterally in the PCAs.    CTA NECK: Patent, ECAs, ICAs, no  hemodynamically significant stenosis at  ICA origins by NASCET criteria.  Bilateral vertebral arteries are patent without flow limiting stenosis.     Discussed with Dr. Barakat in the ED at 7:04 PM. MRI may be helpful for further evaluation, if clinically indicated.    < end of copied text >    < from: CT Brain Stroke Protocol (04.13.21 @ 19:01) >      IMPRESSION:  HEAD CT: Mild volume loss, microvascular disease, no acute hemorrhage or midline shift.    < end of copied text >

## 2021-04-21 NOTE — PROGRESS NOTE ADULT - SUBJECTIVE AND OBJECTIVE BOX
Patient is a 65y old  Female who presents with a chief complaint of R sided numbness (20 Apr 2021 10:36)  Patient seen and examined at bedside. No overnight events reported. Pt reports numbness/tingling R leg. pt Denies any other symptoms.     ALLERGIES:  No Known Allergies    MEDICATIONS  (STANDING):  aspirin enteric coated 81 milliGRAM(s) Oral daily  atorvastatin 80 milliGRAM(s) Oral at bedtime  clopidogrel Tablet 75 milliGRAM(s) Oral daily  dextrose 40% Gel 15 Gram(s) Oral once  dextrose 5%. 1000 milliLiter(s) (50 mL/Hr) IV Continuous <Continuous>  dextrose 5%. 1000 milliLiter(s) (100 mL/Hr) IV Continuous <Continuous>  dextrose 50% Injectable 25 Gram(s) IV Push once  dextrose 50% Injectable 12.5 Gram(s) IV Push once  dextrose 50% Injectable 25 Gram(s) IV Push once  enoxaparin Injectable 40 milliGRAM(s) SubCutaneous daily  gabapentin 300 milliGRAM(s) Oral three times a day  glucagon  Injectable 1 milliGRAM(s) IntraMuscular once  insulin glargine Injectable (LANTUS) 45 Unit(s) SubCutaneous at bedtime  insulin lispro (ADMELOG) corrective regimen sliding scale   SubCutaneous three times a day before meals  insulin lispro Injectable (ADMELOG) 15 Unit(s) SubCutaneous before breakfast  insulin lispro Injectable (ADMELOG) 15 Unit(s) SubCutaneous before lunch  insulin lispro Injectable (ADMELOG) 15 Unit(s) SubCutaneous before dinner  lidocaine   Patch 1 Patch Transdermal every 24 hours  lisinopril 20 milliGRAM(s) Oral two times a day  omega-3-Acid Ethyl Esters 2 Gram(s) Oral two times a day  pantoprazole    Tablet 40 milliGRAM(s) Oral before breakfast    MEDICATIONS  (PRN):  acetaminophen   Tablet .. 650 milliGRAM(s) Oral every 6 hours PRN Temp greater or equal to 38C (100.4F), Mild Pain (1 - 3)  ondansetron    Tablet 4 milliGRAM(s) Oral every 6 hours PRN Nausea and/or Vomiting  traMADol 25 milliGRAM(s) Oral every 6 hours PRN Moderate Pain (4 - 6)  traMADol 50 milliGRAM(s) Oral every 6 hours PRN Severe Pain (7 - 10)    Vital Signs Last 24 Hrs  T(F): 97.9 (21 Apr 2021 11:01), Max: 98.3 (20 Apr 2021 16:53)  HR: 88 (21 Apr 2021 11:01) (78 - 90)  BP: 162/84 (21 Apr 2021 11:01) (124/63 - 165/95)  RR: 15 (21 Apr 2021 11:01) (15 - 17)  SpO2: 98% (21 Apr 2021 11:01) (95% - 100%)  I&O's Summary    20 Apr 2021 07:01  -  21 Apr 2021 07:00  --------------------------------------------------------  IN: 780 mL / OUT: 0 mL / NET: 780 mL    21 Apr 2021 07:01  -  21 Apr 2021 11:20  --------------------------------------------------------  IN: 0 mL / OUT: 0 mL / NET: 0 mL      PHYSICAL EXAM:  General: NAD, A/O x 3  ENT: Moist mucous membranes, no thrush  Neck: Supple, No JVD  Lungs: Clear to auscultation bilaterally, good air entry, non-labored breathing  Cardio: RRR, S1/S2, No murmur  Abdomen: Soft, Nontender, Nondistended; Bowel sounds present  Extremities: No calf tenderness, No pitting edema    LABS:                        12.9   5.90  )-----------( 203      ( 21 Apr 2021 07:30 )             40.1     04-21    139  |  104  |  19  ----------------------------<  217  4.5   |  28  |  0.72    Ca    9.5      21 Apr 2021 07:30    eGFR if Non African American: 89 mL/min/1.73M2 (04-21-21 @ 07:30)  eGFR if : 104 mL/min/1.73M2 (04-21-21 @ 07:30)    PT/INR - ( 21 Apr 2021 07:30 )   PT: 13.8 sec;   INR: 1.15 ratio    04-14 Chol 206 mg/dL LDL -- HDL 37 mg/dL Trig 195 mg/dL    POCT Blood Glucose.: 165 mg/dL (21 Apr 2021 11:03)  POCT Blood Glucose.: 190 mg/dL (21 Apr 2021 07:30)  POCT Blood Glucose.: 184 mg/dL (20 Apr 2021 22:26)  POCT Blood Glucose.: 177 mg/dL (20 Apr 2021 17:18)  POCT Blood Glucose.: 130 mg/dL (20 Apr 2021 11:48)    RADIOLOGY & ADDITIONAL TESTS: No acute fracture or traumatic subluxation. Vertebral body height and alignment maintained. Multilevel degenerative changes as detailed in the body the report      Care Discussed with Consultants/Other Providers:    Patient is a 65y old  Female who presents with a chief complaint of R sided numbness (20 Apr 2021 10:36)  Patient seen and examined at bedside. No overnight events reported. Pt reports numbness/tingling R leg. pt Denies any other symptoms.     ALLERGIES:  No Known Allergies    MEDICATIONS  (STANDING):  aspirin enteric coated 81 milliGRAM(s) Oral daily  atorvastatin 80 milliGRAM(s) Oral at bedtime  clopidogrel Tablet 75 milliGRAM(s) Oral daily  dextrose 40% Gel 15 Gram(s) Oral once  dextrose 5%. 1000 milliLiter(s) (50 mL/Hr) IV Continuous <Continuous>  dextrose 5%. 1000 milliLiter(s) (100 mL/Hr) IV Continuous <Continuous>  dextrose 50% Injectable 25 Gram(s) IV Push once  dextrose 50% Injectable 12.5 Gram(s) IV Push once  dextrose 50% Injectable 25 Gram(s) IV Push once  enoxaparin Injectable 40 milliGRAM(s) SubCutaneous daily  gabapentin 300 milliGRAM(s) Oral three times a day  glucagon  Injectable 1 milliGRAM(s) IntraMuscular once  insulin glargine Injectable (LANTUS) 45 Unit(s) SubCutaneous at bedtime  insulin lispro (ADMELOG) corrective regimen sliding scale   SubCutaneous three times a day before meals  insulin lispro Injectable (ADMELOG) 15 Unit(s) SubCutaneous before breakfast  insulin lispro Injectable (ADMELOG) 15 Unit(s) SubCutaneous before lunch  insulin lispro Injectable (ADMELOG) 15 Unit(s) SubCutaneous before dinner  lidocaine   Patch 1 Patch Transdermal every 24 hours  lisinopril 20 milliGRAM(s) Oral two times a day  omega-3-Acid Ethyl Esters 2 Gram(s) Oral two times a day  pantoprazole    Tablet 40 milliGRAM(s) Oral before breakfast    MEDICATIONS  (PRN):  acetaminophen   Tablet .. 650 milliGRAM(s) Oral every 6 hours PRN Temp greater or equal to 38C (100.4F), Mild Pain (1 - 3)  ondansetron    Tablet 4 milliGRAM(s) Oral every 6 hours PRN Nausea and/or Vomiting  traMADol 25 milliGRAM(s) Oral every 6 hours PRN Moderate Pain (4 - 6)  traMADol 50 milliGRAM(s) Oral every 6 hours PRN Severe Pain (7 - 10)    Vital Signs Last 24 Hrs  T(F): 97.9 (21 Apr 2021 11:01), Max: 98.3 (20 Apr 2021 16:53)  HR: 88 (21 Apr 2021 11:01) (78 - 90)  BP: 162/84 (21 Apr 2021 11:01) (124/63 - 165/95)  RR: 15 (21 Apr 2021 11:01) (15 - 17)  SpO2: 98% (21 Apr 2021 11:01) (95% - 100%)  I&O's Summary    20 Apr 2021 07:01  -  21 Apr 2021 07:00  --------------------------------------------------------  IN: 780 mL / OUT: 0 mL / NET: 780 mL    21 Apr 2021 07:01  -  21 Apr 2021 11:20  --------------------------------------------------------  IN: 0 mL / OUT: 0 mL / NET: 0 mL      PHYSICAL EXAM:  General: NAD, A/O x 3  ENT: Moist mucous membranes, no thrush  Neck: Supple, No JVD  Lungs: Clear to auscultation bilaterally, good air entry, non-labored breathing  Cardio: RRR, S1/S2, No murmur  Abdomen: Soft, Nontender, Nondistended; Bowel sounds present  Extremities: No calf tenderness, No pitting edema  Neuro:  A/O x 3. Strength is symmetric, sensation now reported normal except reduced sensation on right foot and left great toe, speech fluent.      LABS:                        12.9   5.90  )-----------( 203      ( 21 Apr 2021 07:30 )             40.1     04-21    139  |  104  |  19  ----------------------------<  217  4.5   |  28  |  0.72    Ca    9.5      21 Apr 2021 07:30    eGFR if Non African American: 89 mL/min/1.73M2 (04-21-21 @ 07:30)  eGFR if : 104 mL/min/1.73M2 (04-21-21 @ 07:30)    PT/INR - ( 21 Apr 2021 07:30 )   PT: 13.8 sec;   INR: 1.15 ratio    04-14 Chol 206 mg/dL LDL -- HDL 37 mg/dL Trig 195 mg/dL    POCT Blood Glucose.: 165 mg/dL (21 Apr 2021 11:03)  POCT Blood Glucose.: 190 mg/dL (21 Apr 2021 07:30)  POCT Blood Glucose.: 184 mg/dL (20 Apr 2021 22:26)  POCT Blood Glucose.: 177 mg/dL (20 Apr 2021 17:18)  POCT Blood Glucose.: 130 mg/dL (20 Apr 2021 11:48)    RADIOLOGY & ADDITIONAL TESTS: No acute fracture or traumatic subluxation. Vertebral body height and alignment maintained. Multilevel degenerative changes as detailed in the body the report      Care Discussed with Consultants/Other Providers:    Patient is a 65y old  Female who presents with a chief complaint of R sided numbness (20 Apr 2021 10:36)  Patient seen and examined at bedside. No overnight events reported. Pt reports numbness/tingling R leg. pt Denies any other symptoms.     ALLERGIES:  No Known Allergies    MEDICATIONS  (STANDING):  aspirin enteric coated 81 milliGRAM(s) Oral daily  atorvastatin 80 milliGRAM(s) Oral at bedtime  clopidogrel Tablet 75 milliGRAM(s) Oral daily  dextrose 40% Gel 15 Gram(s) Oral once  dextrose 5%. 1000 milliLiter(s) (50 mL/Hr) IV Continuous <Continuous>  dextrose 5%. 1000 milliLiter(s) (100 mL/Hr) IV Continuous <Continuous>  dextrose 50% Injectable 25 Gram(s) IV Push once  dextrose 50% Injectable 12.5 Gram(s) IV Push once  dextrose 50% Injectable 25 Gram(s) IV Push once  enoxaparin Injectable 40 milliGRAM(s) SubCutaneous daily  gabapentin 300 milliGRAM(s) Oral three times a day  glucagon  Injectable 1 milliGRAM(s) IntraMuscular once  insulin glargine Injectable (LANTUS) 45 Unit(s) SubCutaneous at bedtime  insulin lispro (ADMELOG) corrective regimen sliding scale   SubCutaneous three times a day before meals  insulin lispro Injectable (ADMELOG) 15 Unit(s) SubCutaneous before breakfast  insulin lispro Injectable (ADMELOG) 15 Unit(s) SubCutaneous before lunch  insulin lispro Injectable (ADMELOG) 15 Unit(s) SubCutaneous before dinner  lidocaine   Patch 1 Patch Transdermal every 24 hours  lisinopril 20 milliGRAM(s) Oral two times a day  omega-3-Acid Ethyl Esters 2 Gram(s) Oral two times a day  pantoprazole    Tablet 40 milliGRAM(s) Oral before breakfast    MEDICATIONS  (PRN):  acetaminophen   Tablet .. 650 milliGRAM(s) Oral every 6 hours PRN Temp greater or equal to 38C (100.4F), Mild Pain (1 - 3)  ondansetron    Tablet 4 milliGRAM(s) Oral every 6 hours PRN Nausea and/or Vomiting  traMADol 25 milliGRAM(s) Oral every 6 hours PRN Moderate Pain (4 - 6)  traMADol 50 milliGRAM(s) Oral every 6 hours PRN Severe Pain (7 - 10)    Vital Signs Last 24 Hrs  T(F): 97.9 (21 Apr 2021 11:01), Max: 98.3 (20 Apr 2021 16:53)  HR: 88 (21 Apr 2021 11:01) (78 - 90)  BP: 162/84 (21 Apr 2021 11:01) (124/63 - 165/95)  RR: 15 (21 Apr 2021 11:01) (15 - 17)  SpO2: 98% (21 Apr 2021 11:01) (95% - 100%)  I&O's Summary    20 Apr 2021 07:01  -  21 Apr 2021 07:00  --------------------------------------------------------  IN: 780 mL / OUT: 0 mL / NET: 780 mL    21 Apr 2021 07:01  -  21 Apr 2021 11:20  --------------------------------------------------------  IN: 0 mL / OUT: 0 mL / NET: 0 mL      PHYSICAL EXAM:  General: NAD, A/O x 3  ENT: Moist mucous membranes, no thrush  Neck: Supple, No JVD  Lungs: Clear to auscultation bilaterally, good air entry, non-labored breathing  Cardio: RRR, S1/S2, No murmur  Abdomen: Soft, Nontender, Nondistended; Bowel sounds present  Extremities: No calf tenderness, No pitting edema  Neuro:  A/O x 3. Strength is symmetric, sensation now reported normal except reduced sensation on right foot and left great toe, speech fluent.      LABS:                        12.9   5.90  )-----------( 203      ( 21 Apr 2021 07:30 )             40.1     04-21    139  |  104  |  19  ----------------------------<  217  4.5   |  28  |  0.72    Ca    9.5      21 Apr 2021 07:30    eGFR if Non African American: 89 mL/min/1.73M2 (04-21-21 @ 07:30)  eGFR if : 104 mL/min/1.73M2 (04-21-21 @ 07:30)    PT/INR - ( 21 Apr 2021 07:30 )   PT: 13.8 sec;   INR: 1.15 ratio    04-14 Chol 206 mg/dL LDL -- HDL 37 mg/dL Trig 195 mg/dL    POCT Blood Glucose.: 165 mg/dL (21 Apr 2021 11:03)  POCT Blood Glucose.: 190 mg/dL (21 Apr 2021 07:30)  POCT Blood Glucose.: 184 mg/dL (20 Apr 2021 22:26)  POCT Blood Glucose.: 177 mg/dL (20 Apr 2021 17:18)  POCT Blood Glucose.: 130 mg/dL (20 Apr 2021 11:48)    RADIOLOGY & ADDITIONAL TESTS: No acute fracture or traumatic subluxation. Vertebral body height and alignment maintained. Multilevel degenerative changes as detailed in the body the report      Care Discussed with Consultants/Other Providers: updated the PCP Uzair Salazar

## 2021-04-21 NOTE — H&P ADULT - NSHPPHYSICALEXAM_GEN_ALL_CORE
Vital Signs  T(C): 36.7 (04-21-21 @ 14:56), Max: 36.8 (04-20-21 @ 16:53)  HR: 80 (04-21-21 @ 14:56) (78 - 90)  BP: 155/85 (04-21-21 @ 14:56) (124/63 - 165/95)  RR: 15 (04-21-21 @ 14:56) (15 - 17)  SpO2: 100% (04-21-21 @ 14:56) (95% - 100%)    Gen - NAD, Comfortable  HEENT - NCAT, EOMI, MMM  Neck - Supple, No limited ROM  Pulm - CTAB, No wheeze, No rhonchi, No crackles  Cardiovascular - RRR, S1S2, No m/r/g  Abdomen - Soft, NT/ND, +BS  Extremities - No C/C/E, No calf tenderness  Neuro-     Cognitive - AAOx3     Communication - Fluent, No dysarthria     Attention: Intact      Judgement: Good evidence of judgement     Memory: Recall 3 objects immediate and 3 min later         Cranial Nerves - CN 2-12 intact     Motor -                    LEFT    UE - ShAB 5/5, EF 5/5, EE 5/5, WE 5/5,  5/5                    RIGHT UE - ShAB 5/5, EF 5/5, EE 5/5, WE 5/5,  5/5                    LEFT    LE - HF 5/5, KE 5/5, DF 5/5, PF 5/5                    RIGHT LE - HF 5/5, KE 5/5, DF 5/5, PF 5/5        Sensory - Intact to LT     Reflexes - DTR Intact, No primitive reflex     Coordination - FTN intact     Tone - normal  Psychiatric - Mood stable, Affect WNL  Skin: Intact  Wounds: None Present Vital Signs  T(C): 36.7 (04-21-21 @ 14:56), Max: 36.8 (04-20-21 @ 16:53)  HR: 80 (04-21-21 @ 14:56) (78 - 90)  BP: 155/85 (04-21-21 @ 14:56) (124/63 - 165/95)  RR: 15 (04-21-21 @ 14:56) (15 - 17)  SpO2: 100% (04-21-21 @ 14:56) (95% - 100%)    Gen - NAD, Comfortable  HEENT - NCAT, EOMI, MMM  Neck - Supple, No limited ROM  Pulm - CTAB, No wheeze, No rhonchi, No crackles  Cardiovascular - RRR, S1S2, No m/r/g  Abdomen - Soft, NT/ND, +BS  Extremities - No C/C/E, No calf tenderness  Neuro-     Cognitive - AAOx3     Communication - Fluent, mild dysarthria     Attention: Intact      Judgement: Good evidence of judgement     Memory: Recall 3 objects immediate and 3 min later with cueing x1        Cranial Nerves - mild left facial droop otherwise CN 2-12 intact     Motor -                    LEFT    UE - ShAB 5/5, EF 5/5, EE 5/5, WE 5/5,  5/5                    RIGHT UE - ShAB 5/5, EF 5/5, EE 5/5, WE 5/5,  5/5                    LEFT    LE - HF 5/5, KE 5/5, DF 5/5, PF 5/5                    RIGHT LE - HF 5/5, KE 5/5, DF 5/5, PF 5/5        Sensory - increased sensitivity to right lower leg and right low back     Coordination - FTN intact     Tone - normal  Psychiatric - Mood stable, Affect WNL  Skin: Intact Vital Signs  T(C): 36.7 (04-21-21 @ 14:56), Max: 36.8 (04-20-21 @ 16:53)  HR: 80 (04-21-21 @ 14:56) (78 - 90)  BP: 155/85 (04-21-21 @ 14:56) (124/63 - 165/95)  RR: 15 (04-21-21 @ 14:56) (15 - 17)  SpO2: 100% (04-21-21 @ 14:56) (95% - 100%)    Gen - NAD, Comfortable  HEENT - NCAT, EOMI, MMM  Neck - Supple, No limited ROM  Pulm - CTAB, No wheeze, No rhonchi, No crackles  Cardiovascular - RRR, S1S2, No m/r/g  Abdomen - Soft, NT/ND, +BS  Extremities - No C/C/E, No calf tenderness  Neuro-     Cognitive - AAOx3     Communication - Fluent, mild dysarthria     Attention: Intact         Memory: Recall 3 objects immediate and 3 min later with cueing x1        Cranial Nerves - mild left facial droop, sensory impairment, +dysphagia, dysarthria     Motor -                    LEFT    UE - ShAB 5/5, EF 5/5, EE 5/5, WE 5/5,  5/5                    RIGHT UE - ShAB 5/5, EF 5/5, EE 5/5, WE 5/5,  5/5                    LEFT    LE - HF 5/5, KE 5/5, DF 5/5, PF 5/5                    RIGHT LE - HF 5/5, KE 5/5, DF 5/5, PF 5/5        Sensory - increased sensitivity to right lower leg and right low back     Coordination - FTN intact     Tone - normal  Psychiatric - Mood stable, Affect WNL  Skin: Intact

## 2021-04-21 NOTE — CHART NOTE - NSCHARTNOTEFT_GEN_A_CORE
`Brief procedural summary:  Transesophageal echocardiogram (LON)    Type of procedure:  Transesophageal echocardiogram (LON)  Licensed independent practitioner:  Camilo Strickland MD  Indication for LON: s/p CVA, rule out cardiac source of embolism   Brief summary of findings: no evidence of PFO or ASD, no intracardiac thrombus, no vegetations visualized   Complications:  None  Estimated blood loss:  None  Anesthesia type:  Moderate anesthesia (administered by Anesthesia - see separate Anesthesia note)    Full report to follow.    Camilo Strickland MD

## 2021-04-21 NOTE — PROGRESS NOTE ADULT - REASON FOR ADMISSION
R sided numbness

## 2021-04-21 NOTE — H&P ADULT - ASSESSMENT
Assessment/Plan:  RUFINO MARTINEZ is a 65 y.o. 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 Cardio rec LON, done 4/21/21 which showed no cardiac source of embolism.      Comprehensive Multidisciplinary Rehab Program:  - Start comprehensive rehab program, PT/OT/SLP 3 hours a day, 5 days a week.  - Precautions: falls    #L Lateral Medullary CVA  - Continue ASA and Plavix. Last dose of Plavix 5/5/21  - Continue Lipitor 80mg QHS, Fish oil  - LON 4/21/21: no cardiac source of embolism  - F/u neuro outpatient, Dr. Owen    #HTN  - Lisinopril 20mg daily    #DM  - Continue Lantus 45U QHS  - Continue Admelog 15U TID AC  - FS and ISS  - Monitor    Sleep:   - Maintain quiet hours and low stim environment.  - Melatonin PRN to maximize participation in therapy during the day.   - Monitor sleep logs    Pain Management:  - Tylenol PRN, Gabapentin, Tramadol, Lidocaine patch  - Avoid sedating medications that may interfere with cognitive recovery    GI/Bowel:  - At risk for constipation due to neurologic diagnosis, immobility and/or medication use  - Senna QHS, Miralax PRN Daily  - GI ppx: Protonix    /Bladder:   - At risk for incontinence and retention due to neurologic diagnosis and limited mobility  - Currently patient voids independently  - Continue catheter/bladder nursing protocol with bladder scans per routine with straight cath for >400cc.  - Encourage timed voids every 4 hours while awake for independence and to promote continence during therapy.    Skin/Pressure Injury:   - At risk for pressure injury due to neurologic diagnosis and relative immobility.  - Skin assessment on admission: ***  - Turn every 2 hours while in bed, air mattress  - Soft heel protectors  - Skin barrier cream as needed  - Nursing to monitor skin Qshift    DVT ppx:  - Lovenox  - SCDs  --------------  Goals: Safe discharge to home  Estimated Length of Stay: 10-14 days  Rehab Potential: Good  Medical Prognosis: Good  Estimated Disposition: Home with Home Care  ---------------    PRESCREEN COMPARISON:  I have reviewed the prescreen information and I have found no relevant changes between the preadmission screening and my post admission evaluation.    RATIONALE FOR INPATIENT ADMISSION: Patient demonstrates the following:  [X] Medically appropriate for rehabilitation admission  [X] Has attainable rehab goals with an appropriate initial discharge plan  [X]Has rehabilitation potential (expected to make a significant improvement within a reasonable period of time)  [X] Requires close medical management by a rehab physician, rehab nursing care, Hospitalist and comprehensive interdisciplinary team (including PT, OT and/or SLP, Prosthetics and Orthotics)       Assessment/Plan:  RUFINO MARTINEZ is a 65 y.o. 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 Cardio rec LON, done 4/21/21 which showed no cardiac source of embolism.      Comprehensive Multidisciplinary Rehab Program:  - Start comprehensive rehab program, PT/OT/SLP 3 hours a day, 5 days a week.  - Precautions: falls    #L Lateral Medullary CVA  - Continue ASA and Plavix. Last dose of Plavix 5/5/21  - Continue Lipitor 80mg QHS, Fish oil  - LON 4/21/21: no cardiac source of embolism  - F/u neuro outpatient, Dr. Owen    #HTN  - Lisinopril 20mg daily    #DM  - Continue Lantus 45U QHS  - Continue Admelog 15U TID AC  - FS and ISS  - Monitor    Sleep:   - Maintain quiet hours and low stim environment.  - Melatonin PRN to maximize participation in therapy during the day.   - Monitor sleep logs    Pain Management:  - Tylenol PRN, Gabapentin, Tramadol, Lidocaine patch  - Avoid sedating medications that may interfere with cognitive recovery    GI/Bowel:  - At risk for constipation due to neurologic diagnosis, immobility and/or medication use  - Senna QHS, Miralax PRN Daily  - GI ppx: Protonix    /Bladder:   - At risk for incontinence and retention due to neurologic diagnosis and limited mobility  - Currently patient voids independently  - Continue catheter/bladder nursing protocol with bladder scans per routine with straight cath for >400cc.  - Encourage timed voids every 4 hours while awake for independence and to promote continence during therapy.    DVT ppx:  - Lovenox  - SCDs  --------------  Goals: Safe discharge to home  Estimated Length of Stay: 10-14 days  Rehab Potential: Good  Medical Prognosis: Good  Estimated Disposition: Home with Home Care  ---------------    PRESCREEN COMPARISON:  I have reviewed the prescreen information and I have found no relevant changes between the preadmission screening and my post admission evaluation.    RATIONALE FOR INPATIENT ADMISSION: Patient demonstrates the following:  [X] Medically appropriate for rehabilitation admission  [X] Has attainable rehab goals with an appropriate initial discharge plan  [X]Has rehabilitation potential (expected to make a significant improvement within a reasonable period of time)  [X] Requires close medical management by a rehab physician, rehab nursing care, Hospitalist and comprehensive interdisciplinary team (including PT, OT and/or SLP, Prosthetics and Orthotics)       Assessment/Plan:  RUFINO MARTINEZ is a 65 y.o. 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        Comprehensive Multidisciplinary Rehab Program:  - Start comprehensive rehab program, PT/OT/SLP 3 hours a day, 5 days a week.  - Precautions: falls    #L Lateral Medullary CVA  - Continue ASA and Plavix. Last dose of Plavix 5/5/21  - Continue Lipitor 80mg QHS, Fish oil  - LON 4/21/21: no cardiac source of embolism  - F/u neuro outpatient, Dr. Owen    #HTN  - Lisinopril 20mg daily    #DM  - Continue Lantus 45U QHS  - Continue Admelog 15U TID AC  - FS and ISS  - Monitor    Sleep:   - Maintain quiet hours and low stim environment.  - Melatonin PRN to maximize participation in therapy during the day.   - Monitor sleep logs    Pain Management:  - Tylenol PRN, Gabapentin, Tramadol, Lidocaine patch  - Avoid sedating medications that may interfere with cognitive recovery    GI/Bowel:  - At risk for constipation due to neurologic diagnosis, immobility and/or medication use  - Senna QHS, Miralax PRN Daily  - GI ppx: Protonix    /Bladder:   - At risk for incontinence and retention due to neurologic diagnosis and limited mobility  - Currently patient voids independently  - Continue catheter/bladder nursing protocol with bladder scans per routine with straight cath for >400cc.  - Encourage timed voids every 4 hours while awake for independence and to promote continence during therapy.    DVT ppx:  - Lovenox  - SCDs  --------------  Goals: Safe discharge to home  Estimated Length of Stay: 10-14 days  Rehab Potential: Good  Medical Prognosis: Good  Estimated Disposition: Home with Home Care  ---------------    PRESCREEN COMPARISON:  I have reviewed the prescreen information and I have found no relevant changes between the preadmission screening and my post admission evaluation.    RATIONALE FOR INPATIENT ADMISSION: Patient demonstrates the following:  [X] Medically appropriate for rehabilitation admission  [X] Has attainable rehab goals with an appropriate initial discharge plan  [X]Has rehabilitation potential (expected to make a significant improvement within a reasonable period of time)  [X] Requires close medical management by a rehab physician, rehab nursing care, Hospitalist and comprehensive interdisciplinary team (including PT, OT and/or SLP, Prosthetics and Orthotics)

## 2021-04-21 NOTE — PROGRESS NOTE ADULT - ATTENDING COMMENTS
Seen and examined. Chart reviewed.   patient is improving clinically.   right sided numbness, no  muscle weakness.   accepted to acute rehab. DC when bed available  BS high. insulin dose adjusted.  for LON on Wednesday by   Agree with above a/p  Edited where ever is necessary
65F with PMH Type 2 DM, HTN, HLD presents with right sided numbness admitted for CVA. Uncontrolled Type 2 DM - insulin started. Uncontrolled HTN - Lisinopril increased. Neuro and cardio on board. ASA + Plavix x 3 weeks then ASA monotherapy. MRI ordered.
65F with PMH Type 2 DM, HTN, HLD presents with right sided numbness admitted for CVA. Uncontrolled Type 2 DM - insulin adjusted. Blood glucose goal 100-180 in hospital setting. Uncontrolled HTN - Lisinopril increased, plan to start Amlodipine if remains elevated. Neuro and cardio on board. MRI ordered. PT/OT/PMR consulted
numbness and pain better with joelle. LON neg. feeling better. for DC to [facility] today  updated  at bedside

## 2021-04-21 NOTE — PROGRESS NOTE ADULT - ASSESSMENT
Patient is a 65 year old woman admitted yesterday, 4/13/21 with right face, arm, and leg numbness and tingling. She states yesterday morning noted the numbness and tingling suddenly involving the right face, arm, and leg which has persisted. She also notes HA which is generalized. She also notes lightheadedness on standing. She denies other neurological complaints. She denies fever, chest pain, trauma. Neurological exam as above. Would be concerned with left cerebral ischemia secondary to small vessel disease. 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    1) CT Head  no hemorrhage. Microvascular ischemic disease.  2) CTA Head and Neck   bilateral M1 branches without flow limiting stenoses. There are severe focal stenoses bilaterally proximal M2 branches MCA                                                 PCA without flow limiting stenoses, severe focal mid right PCA stenosis, and irregularity left PCA                                                 No significant carotid stenosis  3) CT Perfusion  no core infarct, no active ischemia.  4) With evidence of large vessel disease would place on ASA 81 mg and Plavix 75 mg for 3 weeks and then continue ASA 81 mg alone.  5) MRI Head acute left lateral medullary infarct.  6)  Echo .no thrombus.  7)  Cardiology Consult With evidence of multiple stenoses of large vessels as above would consider further evaluation with regard to possible contributing embolic disease including LON and extended cardiac monitoring.  8) For LON with Cardiology tomorrow, 4/21/21       Appreciate Cardiology consult. Consideration of LON and extended cardiac monitoring with loop recorder.  9) LON preliminary report without cardiac source of embolism. No thrombus.

## 2021-04-21 NOTE — PROGRESS NOTE ADULT - PROVIDER SPECIALTY LIST ADULT
Hospitalist
Neurology
Cardiology
Hospitalist
Neurology
Neurology
Hospitalist
Cardiology

## 2021-04-21 NOTE — PROGRESS NOTE ADULT - SUBJECTIVE AND OBJECTIVE BOX
Patient is a 65 year old woman admitted yesterday 4/13/21 with right face, arm, and leg numbness and tingling. She states yesterday morning noted the numbness and tingling suddenly involving the right face, arm, and leg which has persisted. She also notes HA which is generalized. She also notes lightheadedness on standing. She denies other neurological complaints. She denies fever, chest pain, trauma. Today, Wednesday, she states persists with right face, arm, and leg numbness and tingling but less marked involving the right face and arm. She states also less tingling of the tips of her left toes. She denies other neurological complaints. She denies left leg pain.    PMH: DM- Dr. Marie Dunne          HTN    SH No allergy    Exam :Awake, alert, appropriate           CN II-XII intact           Motor tone and strength normal           Gait slow hesitant            Sensation decreased right face, arm, and leg                       Lipid Profile in AM (04.14.21 @ 05:45)    Cholesterol, Serum: 206 mg/dL    Triglycerides, Serum: 195 mg/dL    HDL Cholesterol, Serum: 37 mg/dL    Non HDL Cholesterol: 169: Patient’s Atherosclerotic Cardiovascular Disease (ASCVD) Risk  Optimal Level (mg/dL)  LDL Cholesterol (LDL-C)  All Patients                                < 100  ASCVD at Very High Risk1    < 70  Non-HDL Cholesterol (Non-HDL-C)  All Patients                        < 130  ASCVD at Very High Risk1   < 100  Non-HDL-Cholesterol (Non-HDL-C) is also a key target for cardiovascular  risk reduction.  Consider Familial Hypercholesterolemia when: LDL-C > 190 mg/dL or  Non-HDL-C > 220 mg/dL.  LDL-C calculation using the Friedewald equation is not provided when  triglycerides > 400 mg/dL, in which case we recommend repeating the test  after fasting, if it was not done before.  When triglycerides >150 mg/dL, calculated LDL-C is provided but may still  be inaccurate (particularly when LDL-C < 70 mg/dL). It can be  recalculated off-line using other equations (e.g. Nahun SS, Leilani SEXTON,  Irish YOUNG, et al.DEE 2013;310:2061 - 8).  1 Bridger Bernstein.,et al. "2019 AHA/ACC. . . guideline on the  management of blood cholesterol: a report of the American College of  Cardiology/American Heart Association Task Force on Clinical Practice  Guidelines." Circulation;139:e1082 - e1143.  These values apply only to persons 20 years and older.  Lipid Panel updated with new test, reference ranges and interpretive  comments effective 10-. mg/dL    LDL Cholesterol Calculated: 130 mg/dL    < from: TTE Echo Complete w/o Contrast w/ Doppler (04.14.21 @ 08:52) >    Summary:   1. Left ventricular ejection fraction, by visual estimation, is 60 to 65%.   2. Normal global left ventricular systolic function.   3. Normal left ventricular internal cavity size.   4. Spectral Doppler shows impaired relaxation pattern ofleft ventricular myocardial filling (Grade I diastolic dysfunction).   5. There is mild asymmetric left ventricular hypertrophy.   6. Normal right ventricular size and function.   7. The left atrium is normal in size.   8. Thickening of the anterior and posterior mitral valve leaflets.   9. Trace mitral valve regurgitation.  10. Mild tricuspid regurgitation.  11. Sclerotic aortic valve with normal opening.  12. LA volume Index is 20.7 ml/m² ml/m              < from: CT Brain Stroke Protocol (04.13.21 @ 19:01) >  [facility]RACHEL EXAMINATION: None.    FINDINGS:  HEAD CT:  VENTRICLES AND SULCI: Ventricles and sulci are unremarkable for patient age.  INTRA-AXIAL: No intracranial mass, acute hemorrhage, or midline shift is present.There is non-specific decreased attenuation in the white matter likely microvascular disease.  EXTRA-AXIAL: No extra-axial fluid collection is present.  INTRACRANIAL HEMORRHAGE: None.    VISUALIZED SINUSES: No air-fluid levels are identified.  VISUALIZED MASTOIDS:  Clear.  CALVARIUM:  Intact.  MISCELLANEOUS:None.    SOFT TISSUES: Unremarkable.  BONES: Unremarkable.      IMPRESSION:  HEAD CT: Mild volume loss, microvascular disease, no acute hemorrhage or midline shift.    Discussed with Dr. Barakat in the ED at 6:44 PM.          < from: CT Perfusion w/ Maps w/ IV Cont (04.13.21 @ 19:02) >  PARISON EXAMINATION: None.    FINDINGS:    CT RAPID PERFUSION:  CBF<30% volume: 0 ml  Tmax>6.0 s volume: 0 ml  Mismatch volume: 0 ml  Mismatch ratio: none    CORE INFARCT: None.  TISSUE AT RISK: None.  MISMATCH RATIO: None.      CTA [place] OF [place]:  ANTERIOR CIRCULATION  ICA  CAVERNOUS, SUPRACLINOID, BIFURCATION SEGMENTS: Patent without flow limiting stenosis.    ANTERIOR CEREBRAL ARTERIES: Bilateral A1, anterior communicating and A2 anterior cerebral arteries are unremarkable in course and caliber without flow limiting stenosis.    MIDDLE CEREBRAL ARTERIES: Patent bilateral M1 branches without flow limiting stenosis.  There are severe focal stenoses bilaterally in the proximal M2 branches of the MCA.    POSTERIOR CIRCULATION:  VERTEBRAL ARTERIES: Patent without flow limiting stenosis.  BASILAR ARTERY: Patent no flow limiting stenosis.      POSTERIOR CEREBRAL ARTERIES: Patent without flow limiting stenosis. Severe focal stenosis in the mid right PCA and diffuse long segment luminal irregularity in the leftPCA.      CTA NECK:  GREAT VESSELS: Visualized segments are patent, no flow limiting stenosis.    COMMON CAROTID ARTERIES:  RIGHT CCA: Patent without flow limiting stenosis  LEFT CCA: Patent without flow limiting stenosis    CAROTID BULBS:  RIGHT CB:Patent without flow limiting stenosis  LEFT CB: Patent without flow limiting stenosis    INTERNAL CAROTID ARTERIES:  RIGHT ICA: Patent no evidence for any hemodynamically significant stenosis at the ICA origin by NASCET criteria.  LEFT ICA: Patent noevidence for any hemodynamically significant stenosis at the ICA origin by NASCET criteria.    VERTEBRAL ARTERIES:  RIGHT VA: Patent no evidence for any flow limiting stenosis.  LEFT VA: Patent no evidence for any flow limiting stenosis.      SOFT TISSUES: Unremarkable  BONES: Unremarkable        < from: MR Head No Cont (04.15.21 @ 13:39) >    COMPARISON: 7/23/2018 MRI brain, CT head dated/13/2020    TECHNIQUE: MRI brain: Multiplanar, multisequence MR imaging of thebrain are obtained without the administration of intravenous Gadavist contrast.    FINDINGS:  There is punctate focus of abnormal restricted diffusion in the left aspect of the mid to lower on image 8 of series 8.    Scattered periventricular and subcortical white matter T2 /FLAIR hyperintensities are seen without mass effect, nonspecific, likely representing minimal chronic microvascular changes.    Normal T2 flow-voids are seen within  the intracranial vasculature. The lateral ventricles and cortical sulci are age-appropriate in size and configuration. There is no mass, mass effect, or extra-axial fluid collection. There is no susceptibility artifact to suggest hemorrhage. Midline structures are normal.  The visualized paranasal sinuses, mastoid air cells and orbits are unremarkable.    Bilateral screening scalp nodular polypoid likely cutaneous lesions are redemonstrated. Direct visual inspection is recommended. Correlate clinically.    IMPRESSION: Acute tiny left lateral medullary infarct. Correlate clinically.                  IMPRESSION:    CT PERFUSION demonstrated: No core infarct. No active ischemia, despite multiple focal severe stenoses by CTA.  If symptoms persist consider follow up head CT or MRI, MRA  if no contraindication.    CTA COW:  Multiple bilateral severe focal stenoses noted in proximal M2 branches of both MCAs and bilaterally in the PCAs.    CTA NECK: Patent, ECAs, ICAs, no  hemodynamically significant stenosis at  ICA origins by NASCET criteria.  Bilateral vertebral arteries are patent without flow limiting stenosis.     Discussed with Dr. Barakat in the ED at 7:04 PM. MRI may be helpful for further evaluation, if clinically indicated.

## 2021-04-21 NOTE — PROGRESS NOTE ADULT - NSICDXPILOT_GEN_ALL_CORE
[place]

## 2021-04-21 NOTE — DISCHARGE NOTE NURSING/CASE MANAGEMENT/SOCIAL WORK - PATIENT PORTAL LINK FT
You can access the FollowMyHealth Patient Portal offered by [facility] by registering at the following website: [email or web address]. By joining [facility]’s FollowMyHealth portal, you will also be able to view your health information using other applications (apps) compatible with our system.

## 2021-04-22 LAB
ALBUMIN SERPL ELPH-MCNC: 3.4 G/DL — SIGNIFICANT CHANGE UP (ref 3.3–5)
ALP SERPL-CCNC: 80 U/L — SIGNIFICANT CHANGE UP (ref 40–120)
ALT FLD-CCNC: 82 U/L — HIGH (ref 10–45)
ANION GAP SERPL CALC-SCNC: 8 MMOL/L — SIGNIFICANT CHANGE UP (ref 5–17)
AST SERPL-CCNC: 89 U/L — HIGH (ref 10–40)
BASOPHILS # BLD AUTO: 0.04 K/UL — SIGNIFICANT CHANGE UP (ref 0–0.2)
BASOPHILS NFR BLD AUTO: 0.8 % — SIGNIFICANT CHANGE UP (ref 0–2)
BILIRUB SERPL-MCNC: 0.7 MG/DL — SIGNIFICANT CHANGE UP (ref 0.2–1.2)
BUN SERPL-MCNC: 18 MG/DL — SIGNIFICANT CHANGE UP (ref 7–23)
CALCIUM SERPL-MCNC: 9.7 MG/DL — SIGNIFICANT CHANGE UP (ref 8.4–10.5)
CHLORIDE SERPL-SCNC: 103 MMOL/L — SIGNIFICANT CHANGE UP (ref 96–108)
CO2 SERPL-SCNC: 27 MMOL/L — SIGNIFICANT CHANGE UP (ref 22–31)
CREAT SERPL-MCNC: 0.7 MG/DL — SIGNIFICANT CHANGE UP (ref 0.5–1.3)
EOSINOPHIL # BLD AUTO: 0.22 K/UL — SIGNIFICANT CHANGE UP (ref 0–0.5)
EOSINOPHIL NFR BLD AUTO: 4.3 % — SIGNIFICANT CHANGE UP (ref 0–6)
GLUCOSE BLDC GLUCOMTR-MCNC: 133 MG/DL — HIGH (ref 70–99)
GLUCOSE BLDC GLUCOMTR-MCNC: 152 MG/DL — HIGH (ref 70–99)
GLUCOSE BLDC GLUCOMTR-MCNC: 197 MG/DL — HIGH (ref 70–99)
GLUCOSE BLDC GLUCOMTR-MCNC: 86 MG/DL — SIGNIFICANT CHANGE UP (ref 70–99)
GLUCOSE SERPL-MCNC: 166 MG/DL — HIGH (ref 70–99)
HCT VFR BLD CALC: 40.6 % — SIGNIFICANT CHANGE UP (ref 34.5–45)
HGB BLD-MCNC: 13.3 G/DL — SIGNIFICANT CHANGE UP (ref 11.5–15.5)
IMM GRANULOCYTES NFR BLD AUTO: 0.2 % — SIGNIFICANT CHANGE UP (ref 0–1.5)
LYMPHOCYTES # BLD AUTO: 1.55 K/UL — SIGNIFICANT CHANGE UP (ref 1–3.3)
LYMPHOCYTES # BLD AUTO: 30.6 % — SIGNIFICANT CHANGE UP (ref 13–44)
MCHC RBC-ENTMCNC: 28.2 PG — SIGNIFICANT CHANGE UP (ref 27–34)
MCHC RBC-ENTMCNC: 32.8 GM/DL — SIGNIFICANT CHANGE UP (ref 32–36)
MCV RBC AUTO: 86 FL — SIGNIFICANT CHANGE UP (ref 80–100)
MONOCYTES # BLD AUTO: 0.52 K/UL — SIGNIFICANT CHANGE UP (ref 0–0.9)
MONOCYTES NFR BLD AUTO: 10.3 % — SIGNIFICANT CHANGE UP (ref 2–14)
NEUTROPHILS # BLD AUTO: 2.72 K/UL — SIGNIFICANT CHANGE UP (ref 1.8–7.4)
NEUTROPHILS NFR BLD AUTO: 53.8 % — SIGNIFICANT CHANGE UP (ref 43–77)
NRBC # BLD: 0 /100 WBCS — SIGNIFICANT CHANGE UP (ref 0–0)
PLATELET # BLD AUTO: 209 K/UL — SIGNIFICANT CHANGE UP (ref 150–400)
POTASSIUM SERPL-MCNC: 4 MMOL/L — SIGNIFICANT CHANGE UP (ref 3.5–5.3)
POTASSIUM SERPL-SCNC: 4 MMOL/L — SIGNIFICANT CHANGE UP (ref 3.5–5.3)
PROT SERPL-MCNC: 8 G/DL — SIGNIFICANT CHANGE UP (ref 6–8.3)
RBC # BLD: 4.72 M/UL — SIGNIFICANT CHANGE UP (ref 3.8–5.2)
RBC # FLD: 12.5 % — SIGNIFICANT CHANGE UP (ref 10.3–14.5)
SARS-COV-2 RNA SPEC QL NAA+PROBE: SIGNIFICANT CHANGE UP
SODIUM SERPL-SCNC: 138 MMOL/L — SIGNIFICANT CHANGE UP (ref 135–145)
WBC # BLD: 5.06 K/UL — SIGNIFICANT CHANGE UP (ref 3.8–10.5)
WBC # FLD AUTO: 5.06 K/UL — SIGNIFICANT CHANGE UP (ref 3.8–10.5)

## 2021-04-22 PROCEDURE — 99255 IP/OBS CONSLTJ NEW/EST HI 80: CPT

## 2021-04-22 PROCEDURE — 99232 SBSQ HOSP IP/OBS MODERATE 35: CPT

## 2021-04-22 RX ADMIN — Medication 15 UNIT(S): at 17:05

## 2021-04-22 RX ADMIN — Medication 2: at 17:06

## 2021-04-22 RX ADMIN — GABAPENTIN 300 MILLIGRAM(S): 400 CAPSULE ORAL at 22:00

## 2021-04-22 RX ADMIN — Medication 2 GRAM(S): at 18:24

## 2021-04-22 RX ADMIN — ENOXAPARIN SODIUM 40 MILLIGRAM(S): 100 INJECTION SUBCUTANEOUS at 12:09

## 2021-04-22 RX ADMIN — INSULIN GLARGINE 35 UNIT(S): 100 INJECTION, SOLUTION SUBCUTANEOUS at 22:00

## 2021-04-22 RX ADMIN — LISINOPRIL 20 MILLIGRAM(S): 2.5 TABLET ORAL at 18:24

## 2021-04-22 RX ADMIN — LIDOCAINE 1 PATCH: 4 CREAM TOPICAL at 18:25

## 2021-04-22 RX ADMIN — LISINOPRIL 20 MILLIGRAM(S): 2.5 TABLET ORAL at 06:30

## 2021-04-22 RX ADMIN — Medication 650 MILLIGRAM(S): at 04:09

## 2021-04-22 RX ADMIN — Medication 2: at 07:54

## 2021-04-22 RX ADMIN — PANTOPRAZOLE SODIUM 40 MILLIGRAM(S): 20 TABLET, DELAYED RELEASE ORAL at 06:30

## 2021-04-22 RX ADMIN — GABAPENTIN 300 MILLIGRAM(S): 400 CAPSULE ORAL at 13:00

## 2021-04-22 RX ADMIN — CLOPIDOGREL BISULFATE 75 MILLIGRAM(S): 75 TABLET, FILM COATED ORAL at 12:10

## 2021-04-22 RX ADMIN — Medication 2 GRAM(S): at 06:31

## 2021-04-22 RX ADMIN — Medication 15 UNIT(S): at 07:55

## 2021-04-22 RX ADMIN — Medication 81 MILLIGRAM(S): at 12:09

## 2021-04-22 RX ADMIN — Medication 650 MILLIGRAM(S): at 03:09

## 2021-04-22 RX ADMIN — LIDOCAINE 1 PATCH: 4 CREAM TOPICAL at 12:11

## 2021-04-22 RX ADMIN — Medication 15 UNIT(S): at 12:10

## 2021-04-22 RX ADMIN — ATORVASTATIN CALCIUM 80 MILLIGRAM(S): 80 TABLET, FILM COATED ORAL at 22:00

## 2021-04-22 RX ADMIN — GABAPENTIN 300 MILLIGRAM(S): 400 CAPSULE ORAL at 06:30

## 2021-04-22 NOTE — CONSULT NOTE ADULT - ASSESSMENT
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    #L Lateral Medullary CVA  -Start comprehensive rehab program -PT/OT/SLP per rehab team  -Pain management, bowel regimen per rehab   -Continue ASA and Plavix. Last dose of Plavix 5/5/21  -Continue Lipitor 80mg QHS, lovaza  -LON 4/21/21: no cardiac source of embolism  -F/u neuro outpatient, Dr. 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    #L Lateral Medullary CVA  -Start comprehensive rehab program -PT/OT/SLP per rehab team  -Pain management, bowel regimen per rehab   -Continue ASA and Plavix. Last dose of Plavix 5/5/21  -Continue Lipitor 80mg QHS, lovaza  -Noted elevated LFTs - if persistently increasing, decrease lipitor to 40mg   -LON 4/21/21: no cardiac source of embolism  -F/u neuro outpatient, Dr. Owen    #HTN  -Lisinopril 20mg BID     #T2DM, uncontrolled - HbA1c 13.9  -Lantus changed from 45un to 40un on telemetry, however was resumed at 45un during rehab admission   -Noted hypoglycemia overnight, overnight staff decreased lantus to 35un qhs - continue to monitor for now   -Continue Admelog 15U TID AC  -FS and ISS  -Gabapentin 300mg TID     DVT ppx - Lovenox, SCDs  GI ppx - Protonix

## 2021-04-22 NOTE — PROGRESS NOTE ADULT - SUBJECTIVE AND OBJECTIVE BOX
Patient is a 65y old  Female who presents with a chief complaint of L lateral medullary CVA  1.4 (22 Apr 2021 07:46)      HPI:  65F hx of HTN, HLD, T2DM off meds since 2018 and has not followed up with PMD since then presenting with R sided numbness. Pt related she has not felt well which started about 4 days ago and felt her balance was off and occasional dizziness. Today after waking up, she took a shower at 11AM and at the time noticed exquisite painful sensitivity in the R lower extremity and throughout the day noted numbness in the RLE and RUE and R side of face and even on the R torso. She went to get a pedicure at around 2 PM and noticed again exquisite painful sensitivity in the R foot when placed in water. She did not have slurring of speech, visual issues, dysphagia, or focal weakness. She went to see PMD and advised ED visit to r/o CVA. In ED, out of window for tPA. Afebrile BP: 187/104 P: 74 sat 100% on RA. CT head and CT angio neg for acute infarct but with focal stenosis in bl M2 MCA and bl PCA. Blood glucose was 511. S/p IV Labetalol 20mg in ED and 6U regular insulin. Pt feels well otherwise - wanted to sign out AMA because she didn't want to be moved upstairs where her mother had passed.     CTH unremarkable. 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 CTA revealed multiple B/L severe focal stenoses prox M2 branch and both MCAs B/L in PCA. As per neuro, continue high dose statin, ASA and Plavix for three weeks (started 4/14/21) then just ASA monotherapy. Cardio rec LON, done 4/21/21 which showed no cardiac source of embolism.    Patient was medically stable for discharge to [facility] for acute rehab 4/21/21.   (21 Apr 2021 15:46)      PAST MEDICAL & SURGICAL HISTORY:  HTN (hypertension)    HLD (hyperlipidemia)    Diabetes mellitus    History of ankle surgery        Allergies    No Known Allergies    Intolerances      SUBJECTIVE: Slept well overnight. She continues to have headache (stable) and hypersensitivity in right hand and right lower leg.       Vital Signs Last 24 Hrs  T(C): 36.4 (21 Apr 2021 22:41), Max: 37.2 (21 Apr 2021 16:29)  T(F): 97.5 (21 Apr 2021 22:41), Max: 99 (21 Apr 2021 16:29)  HR: 86 (22 Apr 2021 07:51) (78 - 87)  BP: 142/87 (22 Apr 2021 07:51) (114/83 - 155/85)  BP(mean): --  RR: 16 (22 Apr 2021 07:51) (15 - 16)  SpO2: 98% (22 Apr 2021 07:51) (98% - 100%)      RECENT LABS:                          13.3   5.06  )-----------( 209      ( 22 Apr 2021 05:00 )             40.6     04-22    138  |  103  |  18  ----------------------------<  166<H>  4.0   |  27  |  0.70    Ca    9.7      22 Apr 2021 05:00    TPro  8.0  /  Alb  3.4  /  TBili  0.7  /  DBili  x   /  AST  89<H>  /  ALT  82<H>  /  AlkPhos  80  04-22    LIVER FUNCTIONS - ( 22 Apr 2021 05:00 )  Alb: 3.4 g/dL / Pro: 8.0 g/dL / ALK PHOS: 80 U/L / ALT: 82 U/L / AST: 89 U/L / GGT: x           PT/INR - ( 21 Apr 2021 07:30 )   PT: 13.8 sec;   INR: 1.15 ratio                 CAPILLARY BLOOD GLUCOSE      POCT Blood Glucose.: 152 mg/dL (22 Apr 2021 07:54)  POCT Blood Glucose.: 99 mg/dL (21 Apr 2021 23:22)  POCT Blood Glucose.: 85 mg/dL (21 Apr 2021 22:53)  POCT Blood Glucose.: 95 mg/dL (21 Apr 2021 22:49)  POCT Blood Glucose.: 99 mg/dL (21 Apr 2021 22:10)  POCT Blood Glucose.: 171 mg/dL (21 Apr 2021 17:08)      MEDICATIONS  (STANDING):  aspirin enteric coated 81 milliGRAM(s) Oral daily  atorvastatin 80 milliGRAM(s) Oral at bedtime  clopidogrel Tablet 75 milliGRAM(s) Oral daily  dextrose 40% Gel 15 Gram(s) Oral once  dextrose 5%. 1000 milliLiter(s) (50 mL/Hr) IV Continuous <Continuous>  dextrose 5%. 1000 milliLiter(s) (100 mL/Hr) IV Continuous <Continuous>  dextrose 50% Injectable 25 Gram(s) IV Push once  dextrose 50% Injectable 12.5 Gram(s) IV Push once  dextrose 50% Injectable 25 Gram(s) IV Push once  enoxaparin Injectable 40 milliGRAM(s) SubCutaneous daily  gabapentin 300 milliGRAM(s) Oral three times a day  glucagon  Injectable 1 milliGRAM(s) IntraMuscular once  insulin glargine Injectable (LANTUS) 35 Unit(s) SubCutaneous at bedtime  insulin lispro (ADMELOG) corrective regimen sliding scale   SubCutaneous three times a day before meals  insulin lispro (ADMELOG) corrective regimen sliding scale   SubCutaneous at bedtime  insulin lispro Injectable (ADMELOG) 15 Unit(s) SubCutaneous three times a day before meals  lidocaine   Patch 1 Patch Transdermal every 24 hours  lisinopril 20 milliGRAM(s) Oral two times a day  omega-3-Acid Ethyl Esters 2 Gram(s) Oral two times a day  pantoprazole    Tablet 40 milliGRAM(s) Oral before breakfast    MEDICATIONS  (PRN):  acetaminophen   Tablet .. 650 milliGRAM(s) Oral every 6 hours PRN Temp greater or equal to 38C (100.4F), Mild Pain (1 - 3)  ondansetron    Tablet 4 milliGRAM(s) Oral every 6 hours PRN Nausea and/or Vomiting  senna 2 Tablet(s) Oral at bedtime PRN Constipation  traMADol 25 milliGRAM(s) Oral every 6 hours PRN Moderate Pain (4 - 6)  traMADol 50 milliGRAM(s) Oral every 6 hours PRN Severe Pain (7 - 10)           Patient is a 65y old  Female who presents with a chief complaint of L lateral medullary CVA  1.4 (22 Apr 2021 07:46)      HPI:  65F hx of HTN, HLD, T2DM off meds since 2018 and has not followed up with PMD since then presenting with R sided numbness. Pt related she has not felt well which started about 4 days ago and felt her balance was off and occasional dizziness. Today after waking up, she took a shower at 11AM and at the time noticed exquisite painful sensitivity in the R lower extremity and throughout the day noted numbness in the RLE and RUE and R side of face and even on the R torso. She went to get a pedicure at around 2 PM and noticed again exquisite painful sensitivity in the R foot when placed in water. She did not have slurring of speech, visual issues, dysphagia, or focal weakness. She went to see PMD and advised ED visit to r/o CVA. In ED, out of window for tPA. Afebrile BP: 187/104 P: 74 sat 100% on RA. CT head and CT angio neg for acute infarct but with focal stenosis in bl M2 MCA and bl PCA. Blood glucose was 511. S/p IV Labetalol 20mg in ED and 6U regular insulin. Pt feels well otherwise - wanted to sign out AMA because she didn't want to be moved upstairs where her mother had passed.     CTH unremarkable. 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 CTA revealed multiple B/L severe focal stenoses prox M2 branch and both MCAs B/L in PCA. As per neuro, continue high dose statin, ASA and Plavix for three weeks (started 4/14/21) then just ASA monotherapy. Cardio rec LON, done 4/21/21 which showed no cardiac source of embolism.    Patient was medically stable for discharge to [facility] for acute rehab 4/21/21.   (21 Apr 2021 15:46)      PAST MEDICAL & SURGICAL HISTORY:  HTN (hypertension)    HLD (hyperlipidemia)    Diabetes mellitus    History of ankle surgery        Allergies    No Known Allergies    Intolerances      SUBJECTIVE: Slept well overnight. She continues to have headache (stable) and hypersensitivity in right hand and right lower leg.       Vital Signs Last 24 Hrs  T(C): 36.4 (21 Apr 2021 22:41), Max: 37.2 (21 Apr 2021 16:29)  T(F): 97.5 (21 Apr 2021 22:41), Max: 99 (21 Apr 2021 16:29)  HR: 86 (22 Apr 2021 07:51) (78 - 87)  BP: 142/87 (22 Apr 2021 07:51) (114/83 - 155/85)  BP(mean): --  RR: 16 (22 Apr 2021 07:51) (15 - 16)  SpO2: 98% (22 Apr 2021 07:51) (98% - 100%)      Gen - NAD, Comfortable  HEENT - NCAT, EOMI, MMM  Neck - Supple, No limited ROM  Pulm - CTAB, No wheeze, No rhonchi, No crackles  Cardiovascular - RRR, S1S2, No m/r/g  Abdomen - Soft, NT/ND, +BS  Extremities - No C/C/E, No calf tenderness  Neuro-     Cognitive - AAOx3     Communication - Fluent, mild dysarthria     Attention: Intact         Memory: Recall 3 objects immediate and 3 min later with cueing x1        Cranial Nerves - mild left facial droop, sensory impairment, +dysphagia, dysarthria     Motor -                    LEFT    UE - ShAB 5/5, EF 5/5, EE 5/5, WE 5/5,  5/5                    RIGHT UE - ShAB 5/5, EF 5/5, EE 5/5, WE 5/5,  5/5                    LEFT    LE - HF 5/5, KE 5/5, DF 5/5, PF 5/5                    RIGHT LE - HF 5/5, KE 5/5, DF 5/5, PF 5/5        Sensory - increased sensitivity to right lower leg and right low back     Coordination - FTN intact     Tone - normal  Psychiatric - Mood stable, Affect WNL  Skin: Intact      RECENT LABS:                          13.3   5.06  )-----------( 209      ( 22 Apr 2021 05:00 )             40.6     04-22    138  |  103  |  18  ----------------------------<  166<H>  4.0   |  27  |  0.70    Ca    9.7      22 Apr 2021 05:00    TPro  8.0  /  Alb  3.4  /  TBili  0.7  /  DBili  x   /  AST  89<H>  /  ALT  82<H>  /  AlkPhos  80  04-22    LIVER FUNCTIONS - ( 22 Apr 2021 05:00 )  Alb: 3.4 g/dL / Pro: 8.0 g/dL / ALK PHOS: 80 U/L / ALT: 82 U/L / AST: 89 U/L / GGT: x           PT/INR - ( 21 Apr 2021 07:30 )   PT: 13.8 sec;   INR: 1.15 ratio                 CAPILLARY BLOOD GLUCOSE      POCT Blood Glucose.: 152 mg/dL (22 Apr 2021 07:54)  POCT Blood Glucose.: 99 mg/dL (21 Apr 2021 23:22)  POCT Blood Glucose.: 85 mg/dL (21 Apr 2021 22:53)  POCT Blood Glucose.: 95 mg/dL (21 Apr 2021 22:49)  POCT Blood Glucose.: 99 mg/dL (21 Apr 2021 22:10)  POCT Blood Glucose.: 171 mg/dL (21 Apr 2021 17:08)      MEDICATIONS  (STANDING):  aspirin enteric coated 81 milliGRAM(s) Oral daily  atorvastatin 80 milliGRAM(s) Oral at bedtime  clopidogrel Tablet 75 milliGRAM(s) Oral daily  dextrose 40% Gel 15 Gram(s) Oral once  dextrose 5%. 1000 milliLiter(s) (50 mL/Hr) IV Continuous <Continuous>  dextrose 5%. 1000 milliLiter(s) (100 mL/Hr) IV Continuous <Continuous>  dextrose 50% Injectable 25 Gram(s) IV Push once  dextrose 50% Injectable 12.5 Gram(s) IV Push once  dextrose 50% Injectable 25 Gram(s) IV Push once  enoxaparin Injectable 40 milliGRAM(s) SubCutaneous daily  gabapentin 300 milliGRAM(s) Oral three times a day  glucagon  Injectable 1 milliGRAM(s) IntraMuscular once  insulin glargine Injectable (LANTUS) 35 Unit(s) SubCutaneous at bedtime  insulin lispro (ADMELOG) corrective regimen sliding scale   SubCutaneous three times a day before meals  insulin lispro (ADMELOG) corrective regimen sliding scale   SubCutaneous at bedtime  insulin lispro Injectable (ADMELOG) 15 Unit(s) SubCutaneous three times a day before meals  lidocaine   Patch 1 Patch Transdermal every 24 hours  lisinopril 20 milliGRAM(s) Oral two times a day  omega-3-Acid Ethyl Esters 2 Gram(s) Oral two times a day  pantoprazole    Tablet 40 milliGRAM(s) Oral before breakfast    MEDICATIONS  (PRN):  acetaminophen   Tablet .. 650 milliGRAM(s) Oral every 6 hours PRN Temp greater or equal to 38C (100.4F), Mild Pain (1 - 3)  ondansetron    Tablet 4 milliGRAM(s) Oral every 6 hours PRN Nausea and/or Vomiting  senna 2 Tablet(s) Oral at bedtime PRN Constipation  traMADol 25 milliGRAM(s) Oral every 6 hours PRN Moderate Pain (4 - 6)  traMADol 50 milliGRAM(s) Oral every 6 hours PRN Severe Pain (7 - 10)           Patient is a 65y old  Female who presents with a chief complaint of L lateral medullary CVA  1.4 (22 Apr 2021 07:46)      HPI:  65F hx of HTN, HLD, T2DM off meds since 2018 and has not followed up with PMD since then presenting with R sided numbness. Pt related she has not felt well which started about 4 days ago and felt her balance was off and occasional dizziness. Today after waking up, she took a shower at 11AM and at the time noticed exquisite painful sensitivity in the R lower extremity and throughout the day noted numbness in the RLE and RUE and R side of face and even on the R torso. She went to get a pedicure at around 2 PM and noticed again exquisite painful sensitivity in the R foot when placed in water. She did not have slurring of speech, visual issues, dysphagia, or focal weakness. She went to see PMD and advised ED visit to r/o CVA. In ED, out of window for tPA. Afebrile BP: 187/104 P: 74 sat 100% on RA. CT head and CT angio neg for acute infarct but with focal stenosis in bl M2 MCA and bl PCA. Blood glucose was 511. S/p IV Labetalol 20mg in ED and 6U regular insulin. Pt feels well otherwise - wanted to sign out AMA because she didn't want to be moved upstairs where her mother had passed.     CTH unremarkable. 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 CTA revealed multiple B/L severe focal stenoses prox M2 branch and both MCAs B/L in PCA. As per neuro, continue high dose statin, ASA and Plavix for three weeks (started 4/14/21) then just ASA monotherapy. Cardio rec LON, done 4/21/21 which showed no cardiac source of embolism.    Patient was medically stable for discharge to [facility] for acute rehab 4/21/21.   (21 Apr 2021 15:46)      PAST MEDICAL & SURGICAL HISTORY:  HTN (hypertension)    HLD (hyperlipidemia)    Diabetes mellitus    History of ankle surgery        Allergies    No Known Allergies    Intolerances      SUBJECTIVE: Slept well overnight. She continues to have headache (stable) and hypersensitivity in right hand and right lower leg. Pt. voiding-- PVR =2      Vital Signs Last 24 Hrs  T(C): 36.4 (21 Apr 2021 22:41), Max: 37.2 (21 Apr 2021 16:29)  T(F): 97.5 (21 Apr 2021 22:41), Max: 99 (21 Apr 2021 16:29)  HR: 86 (22 Apr 2021 07:51) (78 - 87)  BP: 142/87 (22 Apr 2021 07:51) (114/83 - 155/85)  BP(mean): --  RR: 16 (22 Apr 2021 07:51) (15 - 16)  SpO2: 98% (22 Apr 2021 07:51) (98% - 100%)      Gen - NAD, Comfortable  HEENT - NCAT, EOMI, MMM  Neck - Supple, No limited ROM  Pulm - CTAB, No wheeze, No rhonchi, No crackles  Cardiovascular - RRR, S1S2, No m/r/g  Abdomen - Soft, NT/ND, +BS  Extremities - No C/C/E, No calf tenderness  Neuro-     Cognitive - AAOx3     Communication - Fluent, mild dysarthria     Attention: Intact         Memory: Recall 3 objects immediate and 3 min later with cueing x1        Cranial Nerves - mild left facial droop, sensory impairment, +dysphagia, dysarthria     Motor -                    LEFT    UE - ShAB 5/5, EF 5/5, EE 5/5, WE 5/5,  5/5                    RIGHT UE - ShAB 5/5, EF 5/5, EE 5/5, WE 5/5,  5/5                    LEFT    LE - HF 5/5, KE 5/5, DF 5/5, PF 5/5                    RIGHT LE - HF 5/5, KE 5/5, DF 5/5, PF 5/5        Sensory - increased sensitivity to right upper and lower  extremity and right low back     Coordination - FTN intact     Tone - normal  Psychiatric - Mood stable, Affect WNL  Skin: Intact      RECENT LABS:                          13.3   5.06  )-----------( 209      ( 22 Apr 2021 05:00 )             40.6     04-22    138  |  103  |  18  ----------------------------<  166<H>  4.0   |  27  |  0.70    Ca    9.7      22 Apr 2021 05:00    TPro  8.0  /  Alb  3.4  /  TBili  0.7  /  DBili  x   /  AST  89<H>  /  ALT  82<H>  /  AlkPhos  80  04-22    LIVER FUNCTIONS - ( 22 Apr 2021 05:00 )  Alb: 3.4 g/dL / Pro: 8.0 g/dL / ALK PHOS: 80 U/L / ALT: 82 U/L / AST: 89 U/L / GGT: x           PT/INR - ( 21 Apr 2021 07:30 )   PT: 13.8 sec;   INR: 1.15 ratio                 CAPILLARY BLOOD GLUCOSE      POCT Blood Glucose.: 152 mg/dL (22 Apr 2021 07:54)  POCT Blood Glucose.: 99 mg/dL (21 Apr 2021 23:22)  POCT Blood Glucose.: 85 mg/dL (21 Apr 2021 22:53)  POCT Blood Glucose.: 95 mg/dL (21 Apr 2021 22:49)  POCT Blood Glucose.: 99 mg/dL (21 Apr 2021 22:10)  POCT Blood Glucose.: 171 mg/dL (21 Apr 2021 17:08)      MEDICATIONS  (STANDING):  aspirin enteric coated 81 milliGRAM(s) Oral daily  atorvastatin 80 milliGRAM(s) Oral at bedtime  clopidogrel Tablet 75 milliGRAM(s) Oral daily  dextrose 40% Gel 15 Gram(s) Oral once  dextrose 5%. 1000 milliLiter(s) (50 mL/Hr) IV Continuous <Continuous>  dextrose 5%. 1000 milliLiter(s) (100 mL/Hr) IV Continuous <Continuous>  dextrose 50% Injectable 25 Gram(s) IV Push once  dextrose 50% Injectable 12.5 Gram(s) IV Push once  dextrose 50% Injectable 25 Gram(s) IV Push once  enoxaparin Injectable 40 milliGRAM(s) SubCutaneous daily  gabapentin 300 milliGRAM(s) Oral three times a day  glucagon  Injectable 1 milliGRAM(s) IntraMuscular once  insulin glargine Injectable (LANTUS) 35 Unit(s) SubCutaneous at bedtime  insulin lispro (ADMELOG) corrective regimen sliding scale   SubCutaneous three times a day before meals  insulin lispro (ADMELOG) corrective regimen sliding scale   SubCutaneous at bedtime  insulin lispro Injectable (ADMELOG) 15 Unit(s) SubCutaneous three times a day before meals  lidocaine   Patch 1 Patch Transdermal every 24 hours  lisinopril 20 milliGRAM(s) Oral two times a day  omega-3-Acid Ethyl Esters 2 Gram(s) Oral two times a day  pantoprazole    Tablet 40 milliGRAM(s) Oral before breakfast    MEDICATIONS  (PRN):  acetaminophen   Tablet .. 650 milliGRAM(s) Oral every 6 hours PRN Temp greater or equal to 38C (100.4F), Mild Pain (1 - 3)  ondansetron    Tablet 4 milliGRAM(s) Oral every 6 hours PRN Nausea and/or Vomiting  senna 2 Tablet(s) Oral at bedtime PRN Constipation  traMADol 25 milliGRAM(s) Oral every 6 hours PRN Moderate Pain (4 - 6)  traMADol 50 milliGRAM(s) Oral every 6 hours PRN Severe Pain (7 - 10)

## 2021-04-22 NOTE — DIETITIAN INITIAL EVALUATION ADULT. - PERTINENT LABORATORY DATA
Refilled per protocol standing orders. CAPILLARY BLOOD GLUCOSE  POCT Blood Glucose.: 133 mg/dL (22 Apr 2021 12:08)  POCT Blood Glucose.: 152 mg/dL (22 Apr 2021 07:54)  POCT Blood Glucose.: 99 mg/dL (21 Apr 2021 23:22)  POCT Blood Glucose.: 85 mg/dL (21 Apr 2021 22:53)  POCT Blood Glucose.: 95 mg/dL (21 Apr 2021 22:49)  POCT Blood Glucose.: 99 mg/dL (21 Apr 2021 22:10)  POCT Blood Glucose.: 171 mg/dL (21 Apr 2021 17:08)

## 2021-04-22 NOTE — CONSULT NOTE ADULT - SUBJECTIVE AND OBJECTIVE BOX
Patient is a 65y old  Female who presents with a chief complaint of L lateral medullary CVA  1.4 (21 Apr 2021 15:46)    HPI:  65F hx of HTN, HLD, T2DM off meds since 2018 and has not followed up with PMD since then presenting with R sided numbness. Pt related she has not felt well which started about 4 days ago and felt her balance was off and occasional dizziness. Today after waking up, she took a shower at 11AM and at the time noticed exquisite painful sensitivity in the R lower extremity and throughout the day noted numbness in the RLE and RUE and R side of face and even on the R torso. She went to get a pedicure at around 2 PM and noticed again exquisite painful sensitivity in the R foot when placed in water. She did not have slurring of speech, visual issues, dysphagia, or focal weakness. She went to see PMD and advised ED visit to r/o CVA. In ED, out of window for tPA. Afebrile BP: 187/104 P: 74 sat 100% on RA. CT head and CT angio neg for acute infarct but with focal stenosis in bl M2 MCA and bl PCA. Blood glucose was 511. S/p IV Labetalol 20mg in ED and 6U regular insulin. Pt feels well otherwise - wanted to sign out AMA because she didn't want to be moved upstairs where her mother had passed.     CTH unremarkable. 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 CTA revealed multiple B/L severe focal stenoses prox M2 branch and both MCAs B/L in PCA. As per neuro, continue high dose statin, ASA and Plavix for three weeks (started 4/14/21) then just ASA monotherapy. Cardio rec LON, done 4/21/21 which showed no cardiac source of embolism.    Patient was medically stable for discharge to [place] for acute rehab 4/21/21.  (21 Apr 2021 15:46)    PAST MEDICAL & SURGICAL HISTORY:  HTN (hypertension)  HLD (hyperlipidemia)  Diabetes mellitus  History of ankle surgery    SOCIAL HISTORY: Denies tobacco, EtOH, or illicit drug use. lives in home with daughter. previously independent with ADLs, IADLs, and ambulation. previously employed as LPN  FAMILY HISTORY: family hx of CVA    ALLERGIES:  No Known Allergies    MEDICATIONS  (STANDING):  aspirin enteric coated 81 milliGRAM(s) Oral daily  atorvastatin 80 milliGRAM(s) Oral at bedtime  clopidogrel Tablet 75 milliGRAM(s) Oral daily  dextrose 40% Gel 15 Gram(s) Oral once  dextrose 5%. 1000 milliLiter(s) (50 mL/Hr) IV Continuous <Continuous>  dextrose 5%. 1000 milliLiter(s) (100 mL/Hr) IV Continuous <Continuous>  dextrose 50% Injectable 25 Gram(s) IV Push once  dextrose 50% Injectable 12.5 Gram(s) IV Push once  dextrose 50% Injectable 25 Gram(s) IV Push once  enoxaparin Injectable 40 milliGRAM(s) SubCutaneous daily  gabapentin 300 milliGRAM(s) Oral three times a day  glucagon  Injectable 1 milliGRAM(s) IntraMuscular once  insulin glargine Injectable (LANTUS) 35 Unit(s) SubCutaneous at bedtime  insulin lispro (ADMELOG) corrective regimen sliding scale   SubCutaneous three times a day before meals  insulin lispro (ADMELOG) corrective regimen sliding scale   SubCutaneous at bedtime  insulin lispro Injectable (ADMELOG) 15 Unit(s) SubCutaneous three times a day before meals  lidocaine   Patch 1 Patch Transdermal every 24 hours  lisinopril 20 milliGRAM(s) Oral two times a day  omega-3-Acid Ethyl Esters 2 Gram(s) Oral two times a day  pantoprazole    Tablet 40 milliGRAM(s) Oral before breakfast    MEDICATIONS  (PRN):  acetaminophen   Tablet .. 650 milliGRAM(s) Oral every 6 hours PRN Temp greater or equal to 38C (100.4F), Mild Pain (1 - 3)  ondansetron    Tablet 4 milliGRAM(s) Oral every 6 hours PRN Nausea and/or Vomiting  senna 2 Tablet(s) Oral at bedtime PRN Constipation  traMADol 25 milliGRAM(s) Oral every 6 hours PRN Moderate Pain (4 - 6)  traMADol 50 milliGRAM(s) Oral every 6 hours PRN Severe Pain (7 - 10)    Review of Systems: Refer to HPI for pertinent positives and negatives. All other ROS reviewed and negative except as otherwise stated above.    Vital Signs Last 24 Hrs  T(F): 97.5 (21 Apr 2021 22:41), Max: 99 (21 Apr 2021 16:29)  HR: 83 (22 Apr 2021 06:30) (78 - 88)  BP: 155/84 (22 Apr 2021 06:30) (114/83 - 165/95)  RR: 16 (21 Apr 2021 22:41) (15 - 16)  SpO2: 99% (21 Apr 2021 22:41) (97% - 100%)  I&O's Summary    PHYSICAL EXAM:  GENERAL: NAD  HEAD:  Atraumatic, Normocephalic  EYES: EOMI, PERRL, conjunctiva and sclera clear  ENMT: Moist mucous membranes, Good dentition  NECK: Supple, No JVD  CHEST/LUNG: Clear to auscultation bilaterally, non-labored breathing, good air entry  HEART: RRR; S1/S2, No murmur  ABDOMEN: Soft, Nontender, Nondistended; Bowel sounds present  VASCULAR: Normal pulses, Normal capillary refill  EXTREMITIES: No cyanosis, No edema LE b/l  LYMPH: No lymphadenopathy noted  SKIN: Warm, Intact  PSYCH: Normal mood and affect  NERVOUS SYSTEM:  A/O x3, Good concentration; moves extremities x4  LABS:                        12.9   5.90  )-----------( 203      ( 21 Apr 2021 07:30 )             40.1     04-21    139  |  104  |  19  ----------------------------<  217  4.5   |  28  |  0.72    Ca    9.5      21 Apr 2021 07:30      eGFR if Non African American: 89 mL/min/1.73M2 (04-21-21 @ 07:30)  eGFR if : 104 mL/min/1.73M2 (04-21-21 @ 07:30)    PT/INR - ( 21 Apr 2021 07:30 )   PT: 13.8 sec;   INR: 1.15 ratio      POCT Blood Glucose.: 99 mg/dL (21 Apr 2021 23:22)  POCT Blood Glucose.: 85 mg/dL (21 Apr 2021 22:53)  POCT Blood Glucose.: 95 mg/dL (21 Apr 2021 22:49)  POCT Blood Glucose.: 99 mg/dL (21 Apr 2021 22:10)  POCT Blood Glucose.: 171 mg/dL (21 Apr 2021 17:08)  POCT Blood Glucose.: 165 mg/dL (21 Apr 2021 11:03)    RADIOLOGY & ADDITIONAL TESTS: reviewed  < from: CT Lumbar Spine No Cont (04.19.21 @ 13:22) >  IMPRESSION:    No acute fracture or traumatic subluxation.  Vertebral body height and alignment maintained.  Multilevel degenerative changes as detailed in the body the report    < end of copied text >  < from: MR Head No Cont (04.15.21 @ 13:39) >  FINDINGS:  There is punctate focus of abnormal restricted diffusion in the left aspect of the mid to lower on image 8 of series 8.    Scattered periventricular and subcortical white matter T2 /FLAIR hyperintensities are seen without mass effect, nonspecific, likely representing minimal chronic microvascular changes.    Normal T2 flow-voids are seen within  the intracranial vasculature. The lateral ventricles and cortical sulci are age-appropriate in size and configuration. There is no mass, mass effect, or extra-axial fluid collection. There is no susceptibility artifact to suggest hemorrhage. Midline structures are normal.  The visualized paranasal sinuses, mastoid air cells and orbits are unremarkable.    Bilateral screening scalp nodular polypoid likely cutaneous lesions are redemonstrated. Direct visual inspection is recommended. Correlate clinically.    IMPRESSION: Acute tiny left lateral medullary infarct. Correlate clinically.    < end of copied text >  < from: Xray Chest 1 View AP/PA (04.13.21 @ 19:06) >  IMPRESSION: Heart enlargement.    < end of copied text >  < from: CT Angio Neck w/ IV Cont (04.13.21 @ 19:02) >  IMPRESSION:    CT PERFUSION demonstrated: No core infarct. No active ischemia, despite multiple focal severe stenoses by CTA.  If symptoms persist consider follow up head CT or MRI, MRA  if no contraindication.    CTA COW:  Multiple bilateral severe focal stenoses noted in proximal M2 branches of both MCAs and bilaterally in the PCAs.    CTA NECK: Patent, ECAs, ICAs, no  hemodynamically significant stenosis at  ICA origins by NASCET criteria.  Bilateral vertebral arteries are patent without flow limiting stenosis.    < end of copied text >    Care Discussed with Consultants/Other Providers: yes - rehab Patient is a 65y old  Female who presents with a chief complaint of L lateral medullary CVA  1.4 (2021 15:46)    HPI:  65F hx of HTN, HLD, T2DM off meds since 2018 and has not followed up with PMD since then presenting with R sided numbness. Pt related she has not felt well which started about 4 days ago and felt her balance was off and occasional dizziness. Today after waking up, she took a shower at 11AM and at the time noticed exquisite painful sensitivity in the R lower extremity and throughout the day noted numbness in the RLE and RUE and R side of face and even on the R torso. She went to get a pedicure at around 2 PM and noticed again exquisite painful sensitivity in the R foot when placed in water. She did not have slurring of speech, visual issues, dysphagia, or focal weakness. She went to see PMD and advised ED visit to r/o CVA. In ED, out of window for tPA. Afebrile BP: 187/104 P: 74 sat 100% on RA. CT head and CT angio neg for acute infarct but with focal stenosis in bl M2 MCA and bl PCA. Blood glucose was 511. S/p IV Labetalol 20mg in ED and 6U regular insulin. Pt feels well otherwise - wanted to sign out AMA because she didn't want to be moved upstairs where her mother had passed.     CTH unremarkable. 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 CTA revealed multiple B/L severe focal stenoses prox M2 branch and both MCAs B/L in PCA. As per neuro, continue high dose statin, ASA and Plavix for three weeks (started 21) then just ASA monotherapy. Cardio rec LON, done 21 which showed no cardiac source of embolism.    Patient was medically stable for discharge to [place] for acute rehab 21.  (2021 15:46)    Pt seen and examined at bedside, pleasant, stable, NAD. endorse mild chronic headache, unchanged associated with minimal nausea. denies new weakness, fevers, chills, cp, sob, abd pain, diarrhea, constipation, or dysuria.     PAST MEDICAL & SURGICAL HISTORY:  HTN (hypertension)  HLD (hyperlipidemia)  Diabetes mellitus  History of ankle surgery    SOCIAL HISTORY: Denies tobacco, EtOH, or illicit drug use. lives in home with daughter. previously independent with ADLs, IADLs, and ambulation. previously employed as LPN    FAMILY HISTORY: father hx of CHF -  age 80s, mother hx of CVA -  age 80s    ALLERGIES:  No Known Allergies    MEDICATIONS  (STANDING):  aspirin enteric coated 81 milliGRAM(s) Oral daily  atorvastatin 80 milliGRAM(s) Oral at bedtime  clopidogrel Tablet 75 milliGRAM(s) Oral daily  dextrose 40% Gel 15 Gram(s) Oral once  dextrose 5%. 1000 milliLiter(s) (50 mL/Hr) IV Continuous <Continuous>  dextrose 5%. 1000 milliLiter(s) (100 mL/Hr) IV Continuous <Continuous>  dextrose 50% Injectable 25 Gram(s) IV Push once  dextrose 50% Injectable 12.5 Gram(s) IV Push once  dextrose 50% Injectable 25 Gram(s) IV Push once  enoxaparin Injectable 40 milliGRAM(s) SubCutaneous daily  gabapentin 300 milliGRAM(s) Oral three times a day  glucagon  Injectable 1 milliGRAM(s) IntraMuscular once  insulin glargine Injectable (LANTUS) 35 Unit(s) SubCutaneous at bedtime  insulin lispro (ADMELOG) corrective regimen sliding scale   SubCutaneous three times a day before meals  insulin lispro (ADMELOG) corrective regimen sliding scale   SubCutaneous at bedtime  insulin lispro Injectable (ADMELOG) 15 Unit(s) SubCutaneous three times a day before meals  lidocaine   Patch 1 Patch Transdermal every 24 hours  lisinopril 20 milliGRAM(s) Oral two times a day  omega-3-Acid Ethyl Esters 2 Gram(s) Oral two times a day  pantoprazole    Tablet 40 milliGRAM(s) Oral before breakfast    MEDICATIONS  (PRN):  acetaminophen   Tablet .. 650 milliGRAM(s) Oral every 6 hours PRN Temp greater or equal to 38C (100.4F), Mild Pain (1 - 3)  ondansetron    Tablet 4 milliGRAM(s) Oral every 6 hours PRN Nausea and/or Vomiting  senna 2 Tablet(s) Oral at bedtime PRN Constipation  traMADol 25 milliGRAM(s) Oral every 6 hours PRN Moderate Pain (4 - 6)  traMADol 50 milliGRAM(s) Oral every 6 hours PRN Severe Pain (7 - 10)    Review of Systems: Refer to HPI for pertinent positives and negatives. All other ROS reviewed and negative except as otherwise stated above.    Vital Signs Last 24 Hrs  T(F): 97.5 (2021 22:41), Max: 99 (2021 16:29)  HR: 83 (2021 06:30) (78 - 88)  BP: 155/84 (2021 06:30) (114/83 - 165/95)  RR: 16 (2021 22:41) (15 - 16)  SpO2: 99% (2021 22:41) (97% - 100%)  I&O's Summary    PHYSICAL EXAM:  GENERAL: NAD, obese  HEAD:  Atraumatic, Normocephalic  EYES: EOMI, PERRL, conjunctiva and sclera clear  ENMT: Moist mucous membranes, Good dentition  NECK: Supple, No JVD  CHEST/LUNG: Clear to auscultation bilaterally, non-labored breathing, good air entry  HEART: RRR; S1/S2  ABDOMEN: Soft, Nontender, Nondistended; Bowel sounds present  VASCULAR: Normal pulses, Normal capillary refill  EXTREMITIES: No cyanosis, No edema LE b/l  LYMPH: No lymphadenopathy noted  SKIN: Warm, Intact  PSYCH: Normal mood and affect  NERVOUS SYSTEM:  A/O x3, Good concentration; moves extremities x4, +dysarthria, +RLE hypersensitivity/tingling    LABS:                        12.9   5.90  )-----------( 203      ( 2021 07:30 )             40.1         139  |  104  |  19  ----------------------------<  217  4.5   |  28  |  0.72    Ca    9.5      2021 07:30      eGFR if Non African American: 89 mL/min/1.73M2 (21 @ 07:30)  eGFR if : 104 mL/min/1.73M2 (21 @ 07:30)    PT/INR - ( 2021 07:30 )   PT: 13.8 sec;   INR: 1.15 ratio      POCT Blood Glucose.: 99 mg/dL (2021 23:22)  POCT Blood Glucose.: 85 mg/dL (2021 22:53)  POCT Blood Glucose.: 95 mg/dL (2021 22:49)  POCT Blood Glucose.: 99 mg/dL (2021 22:10)  POCT Blood Glucose.: 171 mg/dL (2021 17:08)  POCT Blood Glucose.: 165 mg/dL (2021 11:03)    RADIOLOGY & ADDITIONAL TESTS: reviewed  < from: CT Lumbar Spine No Cont (21 @ 13:22) >  IMPRESSION:    No acute fracture or traumatic subluxation.  Vertebral body height and alignment maintained.  Multilevel degenerative changes as detailed in the body the report    < end of copied text >  < from: MR Head No Cont (04.15.21 @ 13:39) >  FINDINGS:  There is punctate focus of abnormal restricted diffusion in the left aspect of the mid to lower on image 8 of series 8.    Scattered periventricular and subcortical white matter T2 /FLAIR hyperintensities are seen without mass effect, nonspecific, likely representing minimal chronic microvascular changes.    Normal T2 flow-voids are seen within  the intracranial vasculature. The lateral ventricles and cortical sulci are age-appropriate in size and configuration. There is no mass, mass effect, or extra-axial fluid collection. There is no susceptibility artifact to suggest hemorrhage. Midline structures are normal.  The visualized paranasal sinuses, mastoid air cells and orbits are unremarkable.    Bilateral screening scalp nodular polypoid likely cutaneous lesions are redemonstrated. Direct visual inspection is recommended. Correlate clinically.    IMPRESSION: Acute tiny left lateral medullary infarct. Correlate clinically.    < end of copied text >  < from: Xray Chest 1 View AP/PA (21 @ 19:06) >  IMPRESSION: Heart enlargement.    < end of copied text >  < from: CT Angio Neck w/ IV Cont (21 @ 19:02) >  IMPRESSION:    CT PERFUSION demonstrated: No core infarct. No active ischemia, despite multiple focal severe stenoses by CTA.  If symptoms persist consider follow up head CT or MRI, MRA  if no contraindication.    CTA COW:  Multiple bilateral severe focal stenoses noted in proximal M2 branches of both MCAs and bilaterally in the PCAs.    CTA NECK: Patent, ECAs, ICAs, no  hemodynamically significant stenosis at  ICA origins by NASCET criteria.  Bilateral vertebral arteries are patent without flow limiting stenosis.    < end of copied text >    Care Discussed with Consultants/Other Providers: yes - rehab

## 2021-04-22 NOTE — DIETITIAN INITIAL EVALUATION ADULT. - OTHER INFO
65 female non-compliant t2dm (a1C 13.9), htn, hld (Chol 206, Tri 195, HDL 37, ), presented with right sided numbness uncontrolled htn and hyperglycemia found to have L lateral medullary CVA with right sensory impairment/parasthesias, dysphagia, dysarthria and admitted from [facility] to inpatient rehabilitation on 4/21. Patient had previous diet education with RD while in the hospital and writer met with patient today to follow-up with education and gather recall. She states that she is more aware of recommendations and the impact that diet has on her health. The only complaint she has is she is feeling so hungry and feels she isn’t getting enough to eat. Went over non-carbohydrate options to order at meals but encouraged double vegetable portions to promote fiber and health choices and patient is agreeable. Per weight history, appears UBW ~ 193-195# during hospitalization but weight for rehab admission 176# so question weight accuracy. Will trend to determine a more accurate weight. Will update diet office with preferences and accommodate them as able. Will continue to monitor/follow as indicated.

## 2021-04-23 LAB
GLUCOSE BLDC GLUCOMTR-MCNC: 102 MG/DL — HIGH (ref 70–99)
GLUCOSE BLDC GLUCOMTR-MCNC: 130 MG/DL — HIGH (ref 70–99)
GLUCOSE BLDC GLUCOMTR-MCNC: 138 MG/DL — HIGH (ref 70–99)
GLUCOSE BLDC GLUCOMTR-MCNC: 234 MG/DL — HIGH (ref 70–99)

## 2021-04-23 PROCEDURE — 99232 SBSQ HOSP IP/OBS MODERATE 35: CPT

## 2021-04-23 PROCEDURE — 99233 SBSQ HOSP IP/OBS HIGH 50: CPT

## 2021-04-23 RX ORDER — GABAPENTIN 400 MG/1
600 CAPSULE ORAL AT BEDTIME
Refills: 0 | Status: DISCONTINUED | OUTPATIENT
Start: 2021-04-23 | End: 2021-05-01

## 2021-04-23 RX ORDER — INSULIN LISPRO 100/ML
13 VIAL (ML) SUBCUTANEOUS
Refills: 0 | Status: DISCONTINUED | OUTPATIENT
Start: 2021-04-23 | End: 2021-04-26

## 2021-04-23 RX ORDER — AMLODIPINE BESYLATE 2.5 MG/1
5 TABLET ORAL DAILY
Refills: 0 | Status: DISCONTINUED | OUTPATIENT
Start: 2021-04-23 | End: 2021-04-25

## 2021-04-23 RX ORDER — GABAPENTIN 400 MG/1
300 CAPSULE ORAL
Refills: 0 | Status: DISCONTINUED | OUTPATIENT
Start: 2021-04-23 | End: 2021-04-27

## 2021-04-23 RX ADMIN — LIDOCAINE 1 PATCH: 4 CREAM TOPICAL at 18:37

## 2021-04-23 RX ADMIN — CLOPIDOGREL BISULFATE 75 MILLIGRAM(S): 75 TABLET, FILM COATED ORAL at 11:47

## 2021-04-23 RX ADMIN — Medication 650 MILLIGRAM(S): at 04:16

## 2021-04-23 RX ADMIN — LISINOPRIL 20 MILLIGRAM(S): 2.5 TABLET ORAL at 18:36

## 2021-04-23 RX ADMIN — PANTOPRAZOLE SODIUM 40 MILLIGRAM(S): 20 TABLET, DELAYED RELEASE ORAL at 06:16

## 2021-04-23 RX ADMIN — GABAPENTIN 300 MILLIGRAM(S): 400 CAPSULE ORAL at 06:16

## 2021-04-23 RX ADMIN — INSULIN GLARGINE 35 UNIT(S): 100 INJECTION, SOLUTION SUBCUTANEOUS at 21:26

## 2021-04-23 RX ADMIN — TRAMADOL HYDROCHLORIDE 50 MILLIGRAM(S): 50 TABLET ORAL at 07:18

## 2021-04-23 RX ADMIN — LIDOCAINE 1 PATCH: 4 CREAM TOPICAL at 00:26

## 2021-04-23 RX ADMIN — LISINOPRIL 20 MILLIGRAM(S): 2.5 TABLET ORAL at 06:16

## 2021-04-23 RX ADMIN — Medication 4: at 17:03

## 2021-04-23 RX ADMIN — GABAPENTIN 300 MILLIGRAM(S): 400 CAPSULE ORAL at 18:36

## 2021-04-23 RX ADMIN — Medication 2 GRAM(S): at 18:36

## 2021-04-23 RX ADMIN — Medication 15 UNIT(S): at 11:47

## 2021-04-23 RX ADMIN — Medication 13 UNIT(S): at 17:03

## 2021-04-23 RX ADMIN — ATORVASTATIN CALCIUM 80 MILLIGRAM(S): 80 TABLET, FILM COATED ORAL at 21:26

## 2021-04-23 RX ADMIN — Medication 650 MILLIGRAM(S): at 03:24

## 2021-04-23 RX ADMIN — LIDOCAINE 1 PATCH: 4 CREAM TOPICAL at 11:47

## 2021-04-23 RX ADMIN — GABAPENTIN 600 MILLIGRAM(S): 400 CAPSULE ORAL at 21:26

## 2021-04-23 RX ADMIN — Medication 2 GRAM(S): at 06:16

## 2021-04-23 RX ADMIN — TRAMADOL HYDROCHLORIDE 50 MILLIGRAM(S): 50 TABLET ORAL at 06:27

## 2021-04-23 RX ADMIN — LIDOCAINE 1 PATCH: 4 CREAM TOPICAL at 23:37

## 2021-04-23 RX ADMIN — ENOXAPARIN SODIUM 40 MILLIGRAM(S): 100 INJECTION SUBCUTANEOUS at 11:47

## 2021-04-23 RX ADMIN — Medication 15 UNIT(S): at 08:20

## 2021-04-23 RX ADMIN — Medication 81 MILLIGRAM(S): at 11:47

## 2021-04-23 RX ADMIN — ONDANSETRON 4 MILLIGRAM(S): 8 TABLET, FILM COATED ORAL at 10:03

## 2021-04-23 NOTE — PROGRESS NOTE ADULT - SUBJECTIVE AND OBJECTIVE BOX
Patient is a 65y old  Female who presents with a chief complaint of L lateral medullary CVA  1.4 (23 Apr 2021 10:21)      Patient seen and examined at bedside. c/o mild headache and intermittent nausea, unchanged. denies vomiting, abd pain, change in vision, cp, sob.    ALLERGIES:  No Known Allergies    MEDICATIONS  (STANDING):  aspirin enteric coated 81 milliGRAM(s) Oral daily  atorvastatin 80 milliGRAM(s) Oral at bedtime  clopidogrel Tablet 75 milliGRAM(s) Oral daily  dextrose 40% Gel 15 Gram(s) Oral once  dextrose 5%. 1000 milliLiter(s) (50 mL/Hr) IV Continuous <Continuous>  dextrose 5%. 1000 milliLiter(s) (100 mL/Hr) IV Continuous <Continuous>  dextrose 50% Injectable 25 Gram(s) IV Push once  dextrose 50% Injectable 12.5 Gram(s) IV Push once  dextrose 50% Injectable 25 Gram(s) IV Push once  enoxaparin Injectable 40 milliGRAM(s) SubCutaneous daily  gabapentin 300 milliGRAM(s) Oral two times a day  gabapentin 600 milliGRAM(s) Oral at bedtime  glucagon  Injectable 1 milliGRAM(s) IntraMuscular once  insulin glargine Injectable (LANTUS) 35 Unit(s) SubCutaneous at bedtime  insulin lispro (ADMELOG) corrective regimen sliding scale   SubCutaneous three times a day before meals  insulin lispro (ADMELOG) corrective regimen sliding scale   SubCutaneous at bedtime  insulin lispro Injectable (ADMELOG) 15 Unit(s) SubCutaneous three times a day before meals  lidocaine   Patch 1 Patch Transdermal every 24 hours  lisinopril 20 milliGRAM(s) Oral two times a day  omega-3-Acid Ethyl Esters 2 Gram(s) Oral two times a day  pantoprazole    Tablet 40 milliGRAM(s) Oral before breakfast    MEDICATIONS  (PRN):  acetaminophen   Tablet .. 650 milliGRAM(s) Oral every 6 hours PRN Temp greater or equal to 38C (100.4F), Mild Pain (1 - 3)  ondansetron    Tablet 4 milliGRAM(s) Oral every 6 hours PRN Nausea and/or Vomiting  senna 2 Tablet(s) Oral at bedtime PRN Constipation  traMADol 25 milliGRAM(s) Oral every 6 hours PRN Moderate Pain (4 - 6)  traMADol 50 milliGRAM(s) Oral every 6 hours PRN Severe Pain (7 - 10)    Vital Signs Last 24 Hrs  T(F): 97.7 (22 Apr 2021 21:59), Max: 97.7 (22 Apr 2021 21:59)  HR: 77 (23 Apr 2021 08:56) (76 - 80)  BP: 148/87 (23 Apr 2021 08:56) (131/79 - 160/82)  RR: 16 (23 Apr 2021 08:56) (15 - 16)  SpO2: 98% (23 Apr 2021 08:56) (98% - 99%)  I&O's Summary    BMI (kg/m2): 33.3 (04-21-21 @ 19:50)    PHYSICAL EXAM:  General: NAD, A/O x 3, +dysarthria  ENT: MMM, no scleral icterus  Neck: Supple, No JVD  Lungs: Respirations unlabored. Clear to auscultation b/l   Cardio: RRR, S1/S2, No murmurs  Abdomen: Soft, Nontender, Nondistended; Bowel sounds present  Extremities: No calf tenderness, No pitting edema b/l  Neuro: +RLE hypersensitivity/tingling    LABS:                        13.3   5.06  )-----------( 209      ( 22 Apr 2021 05:00 )             40.6       04-22    138  |  103  |  18  ----------------------------<  166  4.0   |  27  |  0.70    Ca    9.7      22 Apr 2021 05:00    TPro  8.0  /  Alb  3.4  /  TBili  0.7  /  DBili  x   /  AST  89  /  ALT  82  /  AlkPhos  80  04-22     eGFR if Non African American: 92 mL/min/1.73M2 (04-22-21 @ 05:00)  eGFR if : 107 mL/min/1.73M2 (04-22-21 @ 05:00)    PT/INR - ( 21 Apr 2021 07:30 )   PT: 13.8 sec;   INR: 1.15 ratio      04-14 Chol 206 mg/dL LDL -- HDL 37 mg/dL Trig 195 mg/dL    POCT Blood Glucose.: 130 mg/dL (23 Apr 2021 08:10)  POCT Blood Glucose.: 86 mg/dL (22 Apr 2021 21:53)  POCT Blood Glucose.: 197 mg/dL (22 Apr 2021 17:03)  POCT Blood Glucose.: 133 mg/dL (22 Apr 2021 12:08)    RADIOLOGY & ADDITIONAL TESTS: reviewed    Care Discussed with Consultants/Other Providers:  yes - rehab

## 2021-04-23 NOTE — PROGRESS NOTE ADULT - ASSESSMENT
Assessment/Plan:  RUFINO MARTINEZ is a 65 y.o. 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        Comprehensive Multidisciplinary Rehab Program:  - Start comprehensive rehab program, PT/OT/SLP 3 hours a day, 5 days a week.  - Precautions: falls    #L Lateral Medullary CVA  - Continue ASA and Plavix. Last dose of Plavix 5/5/21  - Continue Lipitor 80mg QHS, Fish oil  - LON 4/21/21: no cardiac source of embolism  - F/u neuro outpatient, Dr. Owen    #HTN  - Lisinopril 20mg daily    #DM  - Lantus decreased to 35U QHS due to low FS  - Continue Admelog 15U TID AC  - FS and ISS  - Monitor-- hospitalist following and will adjust PRN    Sleep:   - Maintain quiet hours and low stim environment.  - Melatonin PRN to maximize participation in therapy during the day.   - Monitor sleep logs    Pain Management:  - Tylenol PRN, Gabapentin, Tramadol, Lidocaine patch  - Increase Gabapentin 300/300/600 due to continued pain in RLE  - Avoid sedating medications that may interfere with cognitive recovery    GI/Bowel:  - At risk for constipation due to neurologic diagnosis, immobility and/or medication use  - Senna QHS, Miralax PRN Daily  - GI ppx: Protonix    /Bladder:   - At risk for incontinence and retention due to neurologic diagnosis and limited mobility  -  patient voids independently--continent with low PVR      DVT ppx:  - Lovenox  - SCDs   Assessment/Plan:  RUFINO MARTINEZ is a 65 y.o. 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        Comprehensive Multidisciplinary Rehab Program:  - Start comprehensive rehab program, PT/OT/SLP 3 hours a day, 5 days a week.  - Precautions: falls    #L Lateral Medullary CVA  - Continue ASA and Plavix. Last dose of Plavix 5/5/21  - Continue Lipitor 80mg QHS, Fish oil  - LON 4/21/21: no cardiac source of embolism  - F/u neuro outpatient, Dr. Owen    #HTN  --BP elevated  --Added Amlodipine 5mg as per hospitalist recs.    - Lisinopril 20mg daily  -SBP goal 110-140    #DM  - Lantus decreased to 35U QHS due to low FS  - Continue Admelog 15U TID AC  - FS and ISS  - Monitor-- hospitalist following and will adjust PRN    Sleep:   - Maintain quiet hours and low stim environment.  - Melatonin PRN to maximize participation in therapy during the day.   - Monitor sleep logs    Pain Management:  - Tylenol PRN, Gabapentin, Tramadol, Lidocaine patch  - Increase Gabapentin 300/300/600 due to continued pain in RLE  - Avoid sedating medications that may interfere with cognitive recovery    GI/Bowel:  - At risk for constipation due to neurologic diagnosis, immobility and/or medication use  - Senna QHS, Miralax PRN Daily  - GI ppx: Protonix    /Bladder:   - At risk for incontinence and retention due to neurologic diagnosis and limited mobility  -  patient voids independently--continent with low PVR      DVT ppx:  - Lovenox  - SCDs

## 2021-04-23 NOTE — PROGRESS NOTE ADULT - ASSESSMENT
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    #L Lateral Medullary CVA  -Cont comprehensive rehab program -PT/OT/SLP per rehab team  -Pain management, bowel regimen per rehab   -Continue ASA and Plavix. Last dose of Plavix 5/5/21  -Continue Lipitor 80mg QHS, lovaza  -Noted elevated LFTs - if persistently increasing, decrease lipitor to 40mg   -LON 4/21/21: no cardiac source of embolism  -F/u neuro outpatient, Dr. Owen    #HTN  -Lisinopril 20mg BID  -Consider adding amlodipine 5mg qd, SBP goal < 130    #T2DM, uncontrolled - HbA1c 13.9  -Lantus changed from 45un to 40un on telemetry, however was resumed at 45un during rehab admission   -Noted hypoglycemia overnight, overnight staff decreased lantus to 35un qhs - continue to monitor for now   -Continue Admelog 15U TID AC  -FS and ISS  -Gabapentin per rehab    DVT ppx - Lovenox, SCDs  GI ppx - Protonix

## 2021-04-23 NOTE — PROGRESS NOTE ADULT - SUBJECTIVE AND OBJECTIVE BOX
Patient is a 65y old  Female who presents with a chief complaint of L lateral medullary CVA  1.4 (22 Apr 2021 07:46)      HPI:  65F hx of HTN, HLD, T2DM off meds since 2018 and has not followed up with PMD since then presenting with R sided numbness. Pt related she has not felt well which started about 4 days ago and felt her balance was off and occasional dizziness. Today after waking up, she took a shower at 11AM and at the time noticed exquisite painful sensitivity in the R lower extremity and throughout the day noted numbness in the RLE and RUE and R side of face and even on the R torso. She went to get a pedicure at around 2 PM and noticed again exquisite painful sensitivity in the R foot when placed in water. She did not have slurring of speech, visual issues, dysphagia, or focal weakness. She went to see PMD and advised ED visit to r/o CVA. In ED, out of window for tPA. Afebrile BP: 187/104 P: 74 sat 100% on RA. CT head and CT angio neg for acute infarct but with focal stenosis in bl M2 MCA and bl PCA. Blood glucose was 511. S/p IV Labetalol 20mg in ED and 6U regular insulin. Pt feels well otherwise - wanted to sign out AMA because she didn't want to be moved upstairs where her mother had passed.     CTH unremarkable. 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 CTA revealed multiple B/L severe focal stenoses prox M2 branch and both MCAs B/L in PCA. As per neuro, continue high dose statin, ASA and Plavix for three weeks (started 4/14/21) then just ASA monotherapy. Cardio rec LON, done 4/21/21 which showed no cardiac source of embolism.    Patient was medically stable for discharge to [facility] for acute rehab 4/21/21.   (21 Apr 2021 15:46)      PAST MEDICAL & SURGICAL HISTORY:  HTN (hypertension)    HLD (hyperlipidemia)    Diabetes mellitus    History of ankle surgery        Allergies    No Known Allergies    Intolerances      SUBJECTIVE: Slept well overnight. She continues to have headache (stable) and hypersensitivity in right lower leg. Pain is improved in right fingers today.      Vital Signs Last 24 Hrs  T(C): 36.5 (22 Apr 2021 21:59), Max: 36.5 (22 Apr 2021 21:59)  T(F): 97.7 (22 Apr 2021 21:59), Max: 97.7 (22 Apr 2021 21:59)  HR: 77 (23 Apr 2021 08:56) (76 - 80)  BP: 148/87 (23 Apr 2021 08:56) (131/79 - 160/82)  BP(mean): --  RR: 16 (23 Apr 2021 08:56) (15 - 16)  SpO2: 98% (23 Apr 2021 08:56) (98% - 99%)      Gen - NAD, Comfortable  HEENT - NCAT, EOMI, MMM  Neck - Supple, No limited ROM  Pulm - CTAB, No wheeze, No rhonchi, No crackles  Cardiovascular - RRR, S1S2, No m/r/g  Abdomen - Soft, NT/ND, +BS  Extremities - No C/C/E, No calf tenderness  Neuro-     Cognitive - AAOx3     Communication - Fluent, mild dysarthria     Attention: Intact         Memory: Recall 3 objects immediate and 3 min later with cueing x1        Cranial Nerves - mild left facial droop, sensory impairment, dysarthria     Motor -                    LEFT    UE - ShAB 5/5, EF 5/5, EE 5/5, WE 5/5,  5/5                    RIGHT UE - ShAB 5/5, EF 5/5, EE 5/5, WE 5/5,  5/5                    LEFT    LE - HF 5/5, KE 5/5, DF 5/5, PF 5/5                    RIGHT LE - HF 5/5, KE 5/5, DF 5/5, PF 5/5        Sensory - increased sensitivity to right upper and lower  extremity and right low back     Coordination - FTN intact     Tone - normal  Psychiatric - Mood stable, Affect WNL  Skin: Intact      RECENT LABS:                          13.3   5.06  )-----------( 209      ( 22 Apr 2021 05:00 )             40.6     04-22    138  |  103  |  18  ----------------------------<  166<H>  4.0   |  27  |  0.70    Ca    9.7      22 Apr 2021 05:00    TPro  8.0  /  Alb  3.4  /  TBili  0.7  /  DBili  x   /  AST  89<H>  /  ALT  82<H>  /  AlkPhos  80  04-22    LIVER FUNCTIONS - ( 22 Apr 2021 05:00 )  Alb: 3.4 g/dL / Pro: 8.0 g/dL / ALK PHOS: 80 U/L / ALT: 82 U/L / AST: 89 U/L / GGT: x           PT/INR - ( 21 Apr 2021 07:30 )   PT: 13.8 sec;   INR: 1.15 ratio                 CAPILLARY BLOOD GLUCOSE      POCT Blood Glucose.: 152 mg/dL (22 Apr 2021 07:54)  POCT Blood Glucose.: 99 mg/dL (21 Apr 2021 23:22)  POCT Blood Glucose.: 85 mg/dL (21 Apr 2021 22:53)  POCT Blood Glucose.: 95 mg/dL (21 Apr 2021 22:49)  POCT Blood Glucose.: 99 mg/dL (21 Apr 2021 22:10)  POCT Blood Glucose.: 171 mg/dL (21 Apr 2021 17:08)      MEDICATIONS  (STANDING):  aspirin enteric coated 81 milliGRAM(s) Oral daily  atorvastatin 80 milliGRAM(s) Oral at bedtime  clopidogrel Tablet 75 milliGRAM(s) Oral daily  dextrose 40% Gel 15 Gram(s) Oral once  dextrose 5%. 1000 milliLiter(s) (50 mL/Hr) IV Continuous <Continuous>  dextrose 5%. 1000 milliLiter(s) (100 mL/Hr) IV Continuous <Continuous>  dextrose 50% Injectable 25 Gram(s) IV Push once  dextrose 50% Injectable 12.5 Gram(s) IV Push once  dextrose 50% Injectable 25 Gram(s) IV Push once  enoxaparin Injectable 40 milliGRAM(s) SubCutaneous daily  gabapentin 300 milliGRAM(s) Oral two times a day  gabapentin 600 milliGRAM(s) Oral at bedtime  glucagon  Injectable 1 milliGRAM(s) IntraMuscular once  insulin glargine Injectable (LANTUS) 35 Unit(s) SubCutaneous at bedtime  insulin lispro (ADMELOG) corrective regimen sliding scale   SubCutaneous three times a day before meals  insulin lispro (ADMELOG) corrective regimen sliding scale   SubCutaneous at bedtime  insulin lispro Injectable (ADMELOG) 15 Unit(s) SubCutaneous three times a day before meals  lidocaine   Patch 1 Patch Transdermal every 24 hours  lisinopril 20 milliGRAM(s) Oral two times a day  omega-3-Acid Ethyl Esters 2 Gram(s) Oral two times a day  pantoprazole    Tablet 40 milliGRAM(s) Oral before breakfast    MEDICATIONS  (PRN):  acetaminophen   Tablet .. 650 milliGRAM(s) Oral every 6 hours PRN Temp greater or equal to 38C (100.4F), Mild Pain (1 - 3)  ondansetron    Tablet 4 milliGRAM(s) Oral every 6 hours PRN Nausea and/or Vomiting  senna 2 Tablet(s) Oral at bedtime PRN Constipation  traMADol 25 milliGRAM(s) Oral every 6 hours PRN Moderate Pain (4 - 6)  traMADol 50 milliGRAM(s) Oral every 6 hours PRN Severe Pain (7 - 10)             Patient is a 65y old  Female who presents with a chief complaint of L lateral medullary CVA  1.4 (22 Apr 2021 07:46)      HPI:  65F hx of HTN, HLD, T2DM off meds since 2018 and has not followed up with PMD since then presenting with R sided numbness. Pt related she has not felt well which started about 4 days ago and felt her balance was off and occasional dizziness. Today after waking up, she took a shower at 11AM and at the time noticed exquisite painful sensitivity in the R lower extremity and throughout the day noted numbness in the RLE and RUE and R side of face and even on the R torso. She went to get a pedicure at around 2 PM and noticed again exquisite painful sensitivity in the R foot when placed in water. She did not have slurring of speech, visual issues, dysphagia, or focal weakness. She went to see PMD and advised ED visit to r/o CVA. In ED, out of window for tPA. Afebrile BP: 187/104 P: 74 sat 100% on RA. CT head and CT angio neg for acute infarct but with focal stenosis in bl M2 MCA and bl PCA. Blood glucose was 511. S/p IV Labetalol 20mg in ED and 6U regular insulin. Pt feels well otherwise - wanted to sign out AMA because she didn't want to be moved upstairs where her mother had passed.     CTH unremarkable. 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 CTA revealed multiple B/L severe focal stenoses prox M2 branch and both MCAs B/L in PCA. As per neuro, continue high dose statin, ASA and Plavix for three weeks (started 4/14/21) then just ASA monotherapy. Cardio rec LON, done 4/21/21 which showed no cardiac source of embolism.    Patient was medically stable for discharge to [facility] for acute rehab 4/21/21.   (21 Apr 2021 15:46)      PAST MEDICAL & SURGICAL HISTORY:  HTN (hypertension)    HLD (hyperlipidemia)    Diabetes mellitus    History of ankle surgery        Allergies    No Known Allergies    Intolerances      SUBJECTIVE: Slept well overnight. She continues to have headache--was worse last night,  tylenol didn't help much, pt. took tramadol with relief.  No significant headache this AM. + hypersensitivity in right lower leg. Pain is improved in right fingers today.        Vital Signs Last 24 Hrs  T(C): 36.5 (22 Apr 2021 21:59), Max: 36.5 (22 Apr 2021 21:59)  T(F): 97.7 (22 Apr 2021 21:59), Max: 97.7 (22 Apr 2021 21:59)  HR: 77 (23 Apr 2021 08:56) (76 - 80)  BP: 148/87 (23 Apr 2021 08:56) (131/79 - 160/82)  BP(mean): --  RR: 16 (23 Apr 2021 08:56) (15 - 16)  SpO2: 98% (23 Apr 2021 08:56) (98% - 99%)      Gen - NAD, Comfortable  HEENT - NCAT, EOMI, MMM  Neck - Supple, No limited ROM  Pulm - CTAB, No wheeze, No rhonchi, No crackles  Cardiovascular - RRR, S1S2, No m/r/g  Abdomen - Soft, NT/ND, +BS  Extremities - No C/C/E, No calf tenderness  Neuro-     Cognitive - AAOx3     Communication - Fluent, mild dysarthria     Attention: Intact         Memory: Recall 3 objects immediate and 3 min later with cueing x1        Cranial Nerves - mild left facial droop, sensory impairment, dysarthria     Motor -                    LEFT    UE - ShAB 5/5, EF 5/5, EE 5/5, WE 5/5,  5/5                    RIGHT UE - ShAB 5/5, EF 5/5, EE 5/5, WE 5/5,  5/5                    LEFT    LE - HF 5/5, KE 5/5, DF 5/5, PF 5/5                    RIGHT LE - HF 5/5, KE 5/5, DF 5/5, PF 5/5        Sensory - increased sensitivity to right upper and lower  extremity and right low back     Coordination - FTN intact     Tone - normal  Psychiatric - Mood stable, Affect WNL  Skin: Intact      RECENT LABS:                          13.3   5.06  )-----------( 209      ( 22 Apr 2021 05:00 )             40.6     04-22    138  |  103  |  18  ----------------------------<  166<H>  4.0   |  27  |  0.70    Ca    9.7      22 Apr 2021 05:00    TPro  8.0  /  Alb  3.4  /  TBili  0.7  /  DBili  x   /  AST  89<H>  /  ALT  82<H>  /  AlkPhos  80  04-22    LIVER FUNCTIONS - ( 22 Apr 2021 05:00 )  Alb: 3.4 g/dL / Pro: 8.0 g/dL / ALK PHOS: 80 U/L / ALT: 82 U/L / AST: 89 U/L / GGT: x           PT/INR - ( 21 Apr 2021 07:30 )   PT: 13.8 sec;   INR: 1.15 ratio                 CAPILLARY BLOOD GLUCOSE      POCT Blood Glucose.: 152 mg/dL (22 Apr 2021 07:54)  POCT Blood Glucose.: 99 mg/dL (21 Apr 2021 23:22)  POCT Blood Glucose.: 85 mg/dL (21 Apr 2021 22:53)  POCT Blood Glucose.: 95 mg/dL (21 Apr 2021 22:49)  POCT Blood Glucose.: 99 mg/dL (21 Apr 2021 22:10)  POCT Blood Glucose.: 171 mg/dL (21 Apr 2021 17:08)      MEDICATIONS  (STANDING):  aspirin enteric coated 81 milliGRAM(s) Oral daily  atorvastatin 80 milliGRAM(s) Oral at bedtime  clopidogrel Tablet 75 milliGRAM(s) Oral daily  dextrose 40% Gel 15 Gram(s) Oral once  dextrose 5%. 1000 milliLiter(s) (50 mL/Hr) IV Continuous <Continuous>  dextrose 5%. 1000 milliLiter(s) (100 mL/Hr) IV Continuous <Continuous>  dextrose 50% Injectable 25 Gram(s) IV Push once  dextrose 50% Injectable 12.5 Gram(s) IV Push once  dextrose 50% Injectable 25 Gram(s) IV Push once  enoxaparin Injectable 40 milliGRAM(s) SubCutaneous daily  gabapentin 300 milliGRAM(s) Oral two times a day  gabapentin 600 milliGRAM(s) Oral at bedtime  glucagon  Injectable 1 milliGRAM(s) IntraMuscular once  insulin glargine Injectable (LANTUS) 35 Unit(s) SubCutaneous at bedtime  insulin lispro (ADMELOG) corrective regimen sliding scale   SubCutaneous three times a day before meals  insulin lispro (ADMELOG) corrective regimen sliding scale   SubCutaneous at bedtime  insulin lispro Injectable (ADMELOG) 15 Unit(s) SubCutaneous three times a day before meals  lidocaine   Patch 1 Patch Transdermal every 24 hours  lisinopril 20 milliGRAM(s) Oral two times a day  omega-3-Acid Ethyl Esters 2 Gram(s) Oral two times a day  pantoprazole    Tablet 40 milliGRAM(s) Oral before breakfast    MEDICATIONS  (PRN):  acetaminophen   Tablet .. 650 milliGRAM(s) Oral every 6 hours PRN Temp greater or equal to 38C (100.4F), Mild Pain (1 - 3)  ondansetron    Tablet 4 milliGRAM(s) Oral every 6 hours PRN Nausea and/or Vomiting  senna 2 Tablet(s) Oral at bedtime PRN Constipation  traMADol 25 milliGRAM(s) Oral every 6 hours PRN Moderate Pain (4 - 6)  traMADol 50 milliGRAM(s) Oral every 6 hours PRN Severe Pain (7 - 10)

## 2021-04-24 LAB
GLUCOSE BLDC GLUCOMTR-MCNC: 148 MG/DL — HIGH (ref 70–99)
GLUCOSE BLDC GLUCOMTR-MCNC: 173 MG/DL — HIGH (ref 70–99)
GLUCOSE BLDC GLUCOMTR-MCNC: 191 MG/DL — HIGH (ref 70–99)
GLUCOSE BLDC GLUCOMTR-MCNC: 215 MG/DL — HIGH (ref 70–99)

## 2021-04-24 PROCEDURE — 99232 SBSQ HOSP IP/OBS MODERATE 35: CPT

## 2021-04-24 RX ADMIN — CLOPIDOGREL BISULFATE 75 MILLIGRAM(S): 75 TABLET, FILM COATED ORAL at 12:14

## 2021-04-24 RX ADMIN — Medication 2: at 16:46

## 2021-04-24 RX ADMIN — PANTOPRAZOLE SODIUM 40 MILLIGRAM(S): 20 TABLET, DELAYED RELEASE ORAL at 06:33

## 2021-04-24 RX ADMIN — Medication 650 MILLIGRAM(S): at 06:33

## 2021-04-24 RX ADMIN — Medication 13 UNIT(S): at 08:19

## 2021-04-24 RX ADMIN — GABAPENTIN 300 MILLIGRAM(S): 400 CAPSULE ORAL at 06:33

## 2021-04-24 RX ADMIN — LIDOCAINE 1 PATCH: 4 CREAM TOPICAL at 12:15

## 2021-04-24 RX ADMIN — Medication 13 UNIT(S): at 16:45

## 2021-04-24 RX ADMIN — Medication 650 MILLIGRAM(S): at 08:21

## 2021-04-24 RX ADMIN — LISINOPRIL 20 MILLIGRAM(S): 2.5 TABLET ORAL at 06:33

## 2021-04-24 RX ADMIN — Medication 4: at 12:15

## 2021-04-24 RX ADMIN — INSULIN GLARGINE 35 UNIT(S): 100 INJECTION, SOLUTION SUBCUTANEOUS at 21:34

## 2021-04-24 RX ADMIN — Medication 2 GRAM(S): at 17:34

## 2021-04-24 RX ADMIN — GABAPENTIN 300 MILLIGRAM(S): 400 CAPSULE ORAL at 17:34

## 2021-04-24 RX ADMIN — Medication 13 UNIT(S): at 12:15

## 2021-04-24 RX ADMIN — Medication 81 MILLIGRAM(S): at 12:14

## 2021-04-24 RX ADMIN — LISINOPRIL 20 MILLIGRAM(S): 2.5 TABLET ORAL at 17:34

## 2021-04-24 RX ADMIN — ENOXAPARIN SODIUM 40 MILLIGRAM(S): 100 INJECTION SUBCUTANEOUS at 12:14

## 2021-04-24 RX ADMIN — ATORVASTATIN CALCIUM 80 MILLIGRAM(S): 80 TABLET, FILM COATED ORAL at 21:34

## 2021-04-24 RX ADMIN — Medication 2: at 08:20

## 2021-04-24 RX ADMIN — LIDOCAINE 1 PATCH: 4 CREAM TOPICAL at 18:31

## 2021-04-24 RX ADMIN — GABAPENTIN 600 MILLIGRAM(S): 400 CAPSULE ORAL at 21:34

## 2021-04-24 RX ADMIN — Medication 2 GRAM(S): at 06:33

## 2021-04-24 RX ADMIN — AMLODIPINE BESYLATE 5 MILLIGRAM(S): 2.5 TABLET ORAL at 06:33

## 2021-04-24 NOTE — PROGRESS NOTE ADULT - SUBJECTIVE AND OBJECTIVE BOX
Cc: Gait dysfunction    HPI: No acute events overnight.  Pain controlled, no chest pain, no N/V, no Fevers/Chills. No other new ROS  Has been tolerating rehabilitation program.    acetaminophen   Tablet .. 650 milliGRAM(s) Oral every 6 hours PRN  amLODIPine   Tablet 5 milliGRAM(s) Oral daily  aspirin enteric coated 81 milliGRAM(s) Oral daily  atorvastatin 80 milliGRAM(s) Oral at bedtime  clopidogrel Tablet 75 milliGRAM(s) Oral daily  dextrose 40% Gel 15 Gram(s) Oral once  dextrose 5%. 1000 milliLiter(s) IV Continuous <Continuous>  dextrose 5%. 1000 milliLiter(s) IV Continuous <Continuous>  dextrose 50% Injectable 25 Gram(s) IV Push once  dextrose 50% Injectable 12.5 Gram(s) IV Push once  dextrose 50% Injectable 25 Gram(s) IV Push once  enoxaparin Injectable 40 milliGRAM(s) SubCutaneous daily  gabapentin 300 milliGRAM(s) Oral two times a day  gabapentin 600 milliGRAM(s) Oral at bedtime  glucagon  Injectable 1 milliGRAM(s) IntraMuscular once  insulin glargine Injectable (LANTUS) 35 Unit(s) SubCutaneous at bedtime  insulin lispro (ADMELOG) corrective regimen sliding scale   SubCutaneous three times a day before meals  insulin lispro (ADMELOG) corrective regimen sliding scale   SubCutaneous at bedtime  insulin lispro Injectable (ADMELOG) 13 Unit(s) SubCutaneous three times a day before meals  lidocaine   Patch 1 Patch Transdermal every 24 hours  lisinopril 20 milliGRAM(s) Oral two times a day  omega-3-Acid Ethyl Esters 2 Gram(s) Oral two times a day  ondansetron    Tablet 4 milliGRAM(s) Oral every 6 hours PRN  pantoprazole    Tablet 40 milliGRAM(s) Oral before breakfast  senna 2 Tablet(s) Oral at bedtime PRN  traMADol 25 milliGRAM(s) Oral every 6 hours PRN  traMADol 50 milliGRAM(s) Oral every 6 hours PRN      T(C): --  HR: 88 (04-24-21 @ 17:34) (69 - 88)  BP: 163/88 (04-24-21 @ 17:34) (145/84 - 165/89)  RR: 17 (04-24-21 @ 17:34) (16 - 17)  SpO2: 98% (04-24-21 @ 17:34) (98% - 99%)    In NAD  HEENT- EOMI  Heart- RRR, S1S2  Lungs- CTA bl.  Abd- + BS, NT  Ext- No calf pain  Neuro- Exam unchanged          Imp: Patient with diagnosis of L lateral medullary CVA admitted for comprehensive acute rehabilitation.    Plan:  - Continue PT/OT/SLP  - DVT prophylaxis  - Skin- Turn q2h, check skin daily  - Continue current medications; patient medically stable.   -Active issues- HTN, now on amlodipine 5mg QD, will continue to monitor. FS slightly elevated, will monitor, may need to adjust insulin  - Patient is stable to continue current rehabilitation program.

## 2021-04-25 LAB
GLUCOSE BLDC GLUCOMTR-MCNC: 128 MG/DL — HIGH (ref 70–99)
GLUCOSE BLDC GLUCOMTR-MCNC: 133 MG/DL — HIGH (ref 70–99)
GLUCOSE BLDC GLUCOMTR-MCNC: 148 MG/DL — HIGH (ref 70–99)
GLUCOSE BLDC GLUCOMTR-MCNC: 181 MG/DL — HIGH (ref 70–99)

## 2021-04-25 PROCEDURE — 99232 SBSQ HOSP IP/OBS MODERATE 35: CPT

## 2021-04-25 RX ORDER — AMLODIPINE BESYLATE 2.5 MG/1
10 TABLET ORAL DAILY
Refills: 0 | Status: DISCONTINUED | OUTPATIENT
Start: 2021-04-26 | End: 2021-05-01

## 2021-04-25 RX ORDER — PETROLATUM,WHITE
1 JELLY (GRAM) TOPICAL
Refills: 0 | Status: DISCONTINUED | OUTPATIENT
Start: 2021-04-25 | End: 2021-05-01

## 2021-04-25 RX ADMIN — LISINOPRIL 20 MILLIGRAM(S): 2.5 TABLET ORAL at 05:30

## 2021-04-25 RX ADMIN — LIDOCAINE 1 PATCH: 4 CREAM TOPICAL at 19:29

## 2021-04-25 RX ADMIN — Medication 2 GRAM(S): at 17:06

## 2021-04-25 RX ADMIN — Medication 81 MILLIGRAM(S): at 11:54

## 2021-04-25 RX ADMIN — LISINOPRIL 20 MILLIGRAM(S): 2.5 TABLET ORAL at 17:06

## 2021-04-25 RX ADMIN — LIDOCAINE 1 PATCH: 4 CREAM TOPICAL at 00:30

## 2021-04-25 RX ADMIN — LIDOCAINE 1 PATCH: 4 CREAM TOPICAL at 11:54

## 2021-04-25 RX ADMIN — ATORVASTATIN CALCIUM 80 MILLIGRAM(S): 80 TABLET, FILM COATED ORAL at 21:07

## 2021-04-25 RX ADMIN — Medication 650 MILLIGRAM(S): at 09:12

## 2021-04-25 RX ADMIN — Medication 650 MILLIGRAM(S): at 21:06

## 2021-04-25 RX ADMIN — Medication 2 GRAM(S): at 05:30

## 2021-04-25 RX ADMIN — LIDOCAINE 1 PATCH: 4 CREAM TOPICAL at 23:00

## 2021-04-25 RX ADMIN — ENOXAPARIN SODIUM 40 MILLIGRAM(S): 100 INJECTION SUBCUTANEOUS at 11:54

## 2021-04-25 RX ADMIN — GABAPENTIN 600 MILLIGRAM(S): 400 CAPSULE ORAL at 21:07

## 2021-04-25 RX ADMIN — Medication 13 UNIT(S): at 17:06

## 2021-04-25 RX ADMIN — Medication 13 UNIT(S): at 08:29

## 2021-04-25 RX ADMIN — Medication 2: at 11:55

## 2021-04-25 RX ADMIN — Medication 650 MILLIGRAM(S): at 22:06

## 2021-04-25 RX ADMIN — Medication 13 UNIT(S): at 11:54

## 2021-04-25 RX ADMIN — CLOPIDOGREL BISULFATE 75 MILLIGRAM(S): 75 TABLET, FILM COATED ORAL at 11:54

## 2021-04-25 RX ADMIN — PANTOPRAZOLE SODIUM 40 MILLIGRAM(S): 20 TABLET, DELAYED RELEASE ORAL at 08:32

## 2021-04-25 RX ADMIN — GABAPENTIN 300 MILLIGRAM(S): 400 CAPSULE ORAL at 05:30

## 2021-04-25 RX ADMIN — GABAPENTIN 300 MILLIGRAM(S): 400 CAPSULE ORAL at 17:07

## 2021-04-25 RX ADMIN — AMLODIPINE BESYLATE 5 MILLIGRAM(S): 2.5 TABLET ORAL at 05:30

## 2021-04-25 RX ADMIN — Medication 650 MILLIGRAM(S): at 08:29

## 2021-04-25 RX ADMIN — INSULIN GLARGINE 35 UNIT(S): 100 INJECTION, SOLUTION SUBCUTANEOUS at 21:07

## 2021-04-25 NOTE — PROGRESS NOTE ADULT - SUBJECTIVE AND OBJECTIVE BOX
Patient is a 65y old  Female who presents with a chief complaint of L lateral medullary CVA.      No overnight events noted.  Patient seen and examined at bedside.    ALLERGIES:  No Known Allergies    MEDICATIONS  (STANDING):  amLODIPine   Tablet 5 milliGRAM(s) Oral daily  aspirin enteric coated 81 milliGRAM(s) Oral daily  atorvastatin 80 milliGRAM(s) Oral at bedtime  clopidogrel Tablet 75 milliGRAM(s) Oral daily  dextrose 40% Gel 15 Gram(s) Oral once  dextrose 5%. 1000 milliLiter(s) (50 mL/Hr) IV Continuous <Continuous>  dextrose 5%. 1000 milliLiter(s) (100 mL/Hr) IV Continuous <Continuous>  dextrose 50% Injectable 25 Gram(s) IV Push once  dextrose 50% Injectable 12.5 Gram(s) IV Push once  dextrose 50% Injectable 25 Gram(s) IV Push once  enoxaparin Injectable 40 milliGRAM(s) SubCutaneous daily  gabapentin 300 milliGRAM(s) Oral two times a day  gabapentin 600 milliGRAM(s) Oral at bedtime  glucagon  Injectable 1 milliGRAM(s) IntraMuscular once  insulin glargine Injectable (LANTUS) 35 Unit(s) SubCutaneous at bedtime  insulin lispro (ADMELOG) corrective regimen sliding scale   SubCutaneous three times a day before meals  insulin lispro (ADMELOG) corrective regimen sliding scale   SubCutaneous at bedtime  insulin lispro Injectable (ADMELOG) 13 Unit(s) SubCutaneous three times a day before meals  lidocaine   Patch 1 Patch Transdermal every 24 hours  lisinopril 20 milliGRAM(s) Oral two times a day  omega-3-Acid Ethyl Esters 2 Gram(s) Oral two times a day  pantoprazole    Tablet 40 milliGRAM(s) Oral before breakfast    MEDICATIONS  (PRN):  acetaminophen   Tablet .. 650 milliGRAM(s) Oral every 6 hours PRN Temp greater or equal to 38C (100.4F), Mild Pain (1 - 3)  ondansetron    Tablet 4 milliGRAM(s) Oral every 6 hours PRN Nausea and/or Vomiting  senna 2 Tablet(s) Oral at bedtime PRN Constipation  traMADol 25 milliGRAM(s) Oral every 6 hours PRN Moderate Pain (4 - 6)  traMADol 50 milliGRAM(s) Oral every 6 hours PRN Severe Pain (7 - 10)    Vital Signs Last 24 Hrs  T(F): 98.3 (25 Apr 2021 08:34), Max: 98.3 (25 Apr 2021 08:34)  HR: 78 (25 Apr 2021 08:34) (78 - 88)  BP: 164/76 (25 Apr 2021 08:34) (157/82 - 164/76)  RR: 15 (25 Apr 2021 08:34) (14 - 17)  SpO2: 98% (25 Apr 2021 08:34) (98% - 100%)    PHYSICAL EXAM:  General: NAD, A/O x 3, +dysarthria  ENT: MMM, no scleral icterus  Neck: Supple, No JVD  Lungs: Respirations unlabored. Clear to auscultation b/l   Cardio: RRR, S1/S2, No murmurs  Abdomen: Soft, Nontender, Nondistended; Bowel sounds present  Extremities: No calf tenderness, No pitting edema b/l  Neuro: +RLE hypersensitivity/tingling    LABS: no new labs    04-14 Chol 206 mg/dL LDL -- HDL 37 mg/dL Trig 195 mg/dL    POCT Blood Glucose.: 148 mg/dL (25 Apr 2021 08:28)  POCT Blood Glucose.: 148 mg/dL (24 Apr 2021 21:33)  POCT Blood Glucose.: 191 mg/dL (24 Apr 2021 16:44)  POCT Blood Glucose.: 215 mg/dL (24 Apr 2021 12:13)

## 2021-04-25 NOTE — PROGRESS NOTE ADULT - SUBJECTIVE AND OBJECTIVE BOX
Cc: Gait dysfunction    HPI: Patient with no new medical issues today.  Pain controlled, no chest pain, no N/V, no Fevers/Chills. No other new ROS  Has been tolerating rehabilitation program.    acetaminophen   Tablet .. 650 milliGRAM(s) Oral every 6 hours PRN  aspirin enteric coated 81 milliGRAM(s) Oral daily  atorvastatin 80 milliGRAM(s) Oral at bedtime  clopidogrel Tablet 75 milliGRAM(s) Oral daily  dextrose 40% Gel 15 Gram(s) Oral once  dextrose 5%. 1000 milliLiter(s) IV Continuous <Continuous>  dextrose 5%. 1000 milliLiter(s) IV Continuous <Continuous>  dextrose 50% Injectable 25 Gram(s) IV Push once  dextrose 50% Injectable 12.5 Gram(s) IV Push once  dextrose 50% Injectable 25 Gram(s) IV Push once  enoxaparin Injectable 40 milliGRAM(s) SubCutaneous daily  gabapentin 300 milliGRAM(s) Oral two times a day  gabapentin 600 milliGRAM(s) Oral at bedtime  glucagon  Injectable 1 milliGRAM(s) IntraMuscular once  insulin glargine Injectable (LANTUS) 35 Unit(s) SubCutaneous at bedtime  insulin lispro (ADMELOG) corrective regimen sliding scale   SubCutaneous three times a day before meals  insulin lispro (ADMELOG) corrective regimen sliding scale   SubCutaneous at bedtime  insulin lispro Injectable (ADMELOG) 13 Unit(s) SubCutaneous three times a day before meals  lidocaine   Patch 1 Patch Transdermal every 24 hours  lisinopril 20 milliGRAM(s) Oral two times a day  omega-3-Acid Ethyl Esters 2 Gram(s) Oral two times a day  ondansetron    Tablet 4 milliGRAM(s) Oral every 6 hours PRN  pantoprazole    Tablet 40 milliGRAM(s) Oral before breakfast  senna 2 Tablet(s) Oral at bedtime PRN  traMADol 25 milliGRAM(s) Oral every 6 hours PRN  traMADol 50 milliGRAM(s) Oral every 6 hours PRN      T(C): 36.8 (04-25-21 @ 08:34), Max: 36.8 (04-25-21 @ 08:34)  HR: 78 (04-25-21 @ 08:34) (78 - 88)  BP: 164/76 (04-25-21 @ 08:34) (157/82 - 164/76)  RR: 15 (04-25-21 @ 08:34) (14 - 17)  SpO2: 98% (04-25-21 @ 08:34) (98% - 100%)    In NAD  HEENT- EOMI  Heart- RRR, S1S2  Lungs- CTA bl.  Abd- + BS, NT  Ext- No calf pain  Neuro- Exam unchanged          Imp: Patient with diagnosis of L Lateral medullary CVA admitted for comprehensive acute rehabilitation.    Plan:  - Continue PT/OT/SLP  - DVT prophylaxis  - Skin- Turn q2h, check skin daily  - Continue current medications; patient medically stable.   -Active issues- elevated BP, increased norvasc from 5 to 10mg, continue lisinopril 20mg BID  - Patient is stable to continue current rehabilitation program.

## 2021-04-25 NOTE — PROGRESS NOTE ADULT - ASSESSMENT
#L Lateral Medullary CVA  -comprehensive rehab program -PT/OT/SLP per rehab team  -Pain management, bowel regimen per rehab   -ASA and Plavix followed by ASA monotherapy, last dose of Plavix will be 5/5/21  -Lipitor 80mg QHS, lovaza  -Noted elevated LFTs - if persistently increasing, decrease lipitor to 40mg   -LON 4/21/21: no cardiac source of embolism  -F/u neuro outpatient, Dr. Owen    #HTN, uncontrolled  -lisinopril 20mg BID  -increase norvasc 5mg to 10mg 4/25/21    #T2DM, uncontrolled - HbA1c 13.9  -Lantus 35un qhs + Admelog 15U TID AC  -FS and ISS  -Gabapentin per rehab    DVT ppx - Lovenox, SCDs  GI ppx - Protonix     #L Lateral Medullary CVA  -comprehensive rehab program -PT/OT/SLP per rehab team  -Pain management, bowel regimen per rehab   -ASA and Plavix followed by ASA monotherapy, last dose of Plavix will be 5/5/21  -Lipitor 80mg QHS, lovaza  -Noted elevated LFTs - if persistently increasing, decrease lipitor to 40mg   -LON 4/21/21: no cardiac source of embolism  -F/u neuro outpatient, Dr. Owen    #HTN, uncontrolled  -lisinopril 20mg BID  -increase norvasc 5mg to 10mg 4/25/21    #T2DM, uncontrolled - HbA1c 13.9  -Lantus 35un qhs + Admelog 13U TID AC  -FS and ISS  -Gabapentin per rehab    DVT ppx - Lovenox, SCDs  GI ppx - Protonix

## 2021-04-26 LAB
ALBUMIN SERPL ELPH-MCNC: 3.1 G/DL — LOW (ref 3.3–5)
ALP SERPL-CCNC: 86 U/L — SIGNIFICANT CHANGE UP (ref 40–120)
ALT FLD-CCNC: 78 U/L — HIGH (ref 10–45)
ANION GAP SERPL CALC-SCNC: 8 MMOL/L — SIGNIFICANT CHANGE UP (ref 5–17)
AST SERPL-CCNC: 70 U/L — HIGH (ref 10–40)
BASOPHILS # BLD AUTO: 0.02 K/UL — SIGNIFICANT CHANGE UP (ref 0–0.2)
BASOPHILS NFR BLD AUTO: 0.4 % — SIGNIFICANT CHANGE UP (ref 0–2)
BILIRUB SERPL-MCNC: 0.3 MG/DL — SIGNIFICANT CHANGE UP (ref 0.2–1.2)
BUN SERPL-MCNC: 15 MG/DL — SIGNIFICANT CHANGE UP (ref 7–23)
CALCIUM SERPL-MCNC: 9.8 MG/DL — SIGNIFICANT CHANGE UP (ref 8.4–10.5)
CHLORIDE SERPL-SCNC: 105 MMOL/L — SIGNIFICANT CHANGE UP (ref 96–108)
CO2 SERPL-SCNC: 27 MMOL/L — SIGNIFICANT CHANGE UP (ref 22–31)
CREAT SERPL-MCNC: 0.59 MG/DL — SIGNIFICANT CHANGE UP (ref 0.5–1.3)
EOSINOPHIL # BLD AUTO: 0.2 K/UL — SIGNIFICANT CHANGE UP (ref 0–0.5)
EOSINOPHIL NFR BLD AUTO: 3.7 % — SIGNIFICANT CHANGE UP (ref 0–6)
GLUCOSE BLDC GLUCOMTR-MCNC: 111 MG/DL — HIGH (ref 70–99)
GLUCOSE BLDC GLUCOMTR-MCNC: 128 MG/DL — HIGH (ref 70–99)
GLUCOSE BLDC GLUCOMTR-MCNC: 92 MG/DL — SIGNIFICANT CHANGE UP (ref 70–99)
GLUCOSE BLDC GLUCOMTR-MCNC: 96 MG/DL — SIGNIFICANT CHANGE UP (ref 70–99)
GLUCOSE SERPL-MCNC: 128 MG/DL — HIGH (ref 70–99)
HCT VFR BLD CALC: 40.4 % — SIGNIFICANT CHANGE UP (ref 34.5–45)
HGB BLD-MCNC: 12.9 G/DL — SIGNIFICANT CHANGE UP (ref 11.5–15.5)
IMM GRANULOCYTES NFR BLD AUTO: 0.2 % — SIGNIFICANT CHANGE UP (ref 0–1.5)
LYMPHOCYTES # BLD AUTO: 2.24 K/UL — SIGNIFICANT CHANGE UP (ref 1–3.3)
LYMPHOCYTES # BLD AUTO: 41.2 % — SIGNIFICANT CHANGE UP (ref 13–44)
MCHC RBC-ENTMCNC: 27.8 PG — SIGNIFICANT CHANGE UP (ref 27–34)
MCHC RBC-ENTMCNC: 31.9 GM/DL — LOW (ref 32–36)
MCV RBC AUTO: 87.1 FL — SIGNIFICANT CHANGE UP (ref 80–100)
MONOCYTES # BLD AUTO: 0.53 K/UL — SIGNIFICANT CHANGE UP (ref 0–0.9)
MONOCYTES NFR BLD AUTO: 9.7 % — SIGNIFICANT CHANGE UP (ref 2–14)
NEUTROPHILS # BLD AUTO: 2.44 K/UL — SIGNIFICANT CHANGE UP (ref 1.8–7.4)
NEUTROPHILS NFR BLD AUTO: 44.8 % — SIGNIFICANT CHANGE UP (ref 43–77)
NRBC # BLD: 0 /100 WBCS — SIGNIFICANT CHANGE UP (ref 0–0)
PLATELET # BLD AUTO: 252 K/UL — SIGNIFICANT CHANGE UP (ref 150–400)
POTASSIUM SERPL-MCNC: 3.8 MMOL/L — SIGNIFICANT CHANGE UP (ref 3.5–5.3)
POTASSIUM SERPL-SCNC: 3.8 MMOL/L — SIGNIFICANT CHANGE UP (ref 3.5–5.3)
PROT SERPL-MCNC: 7.7 G/DL — SIGNIFICANT CHANGE UP (ref 6–8.3)
RBC # BLD: 4.64 M/UL — SIGNIFICANT CHANGE UP (ref 3.8–5.2)
RBC # FLD: 12.8 % — SIGNIFICANT CHANGE UP (ref 10.3–14.5)
SODIUM SERPL-SCNC: 140 MMOL/L — SIGNIFICANT CHANGE UP (ref 135–145)
WBC # BLD: 5.44 K/UL — SIGNIFICANT CHANGE UP (ref 3.8–10.5)
WBC # FLD AUTO: 5.44 K/UL — SIGNIFICANT CHANGE UP (ref 3.8–10.5)

## 2021-04-26 PROCEDURE — 70450 CT HEAD/BRAIN W/O DYE: CPT | Mod: 26

## 2021-04-26 PROCEDURE — 99233 SBSQ HOSP IP/OBS HIGH 50: CPT

## 2021-04-26 RX ORDER — TRAMADOL HYDROCHLORIDE 50 MG/1
25 TABLET ORAL EVERY 6 HOURS
Refills: 0 | Status: DISCONTINUED | OUTPATIENT
Start: 2021-04-26 | End: 2021-05-01

## 2021-04-26 RX ORDER — MAGNESIUM OXIDE 400 MG ORAL TABLET 241.3 MG
400 TABLET ORAL
Refills: 0 | Status: DISCONTINUED | OUTPATIENT
Start: 2021-04-26 | End: 2021-05-01

## 2021-04-26 RX ORDER — METFORMIN HYDROCHLORIDE 850 MG/1
500 TABLET ORAL
Refills: 0 | Status: DISCONTINUED | OUTPATIENT
Start: 2021-04-26 | End: 2021-05-01

## 2021-04-26 RX ORDER — INSULIN LISPRO 100/ML
10 VIAL (ML) SUBCUTANEOUS
Refills: 0 | Status: DISCONTINUED | OUTPATIENT
Start: 2021-04-26 | End: 2021-04-29

## 2021-04-26 RX ORDER — INSULIN LISPRO 100/ML
8 VIAL (ML) SUBCUTANEOUS ONCE
Refills: 0 | Status: COMPLETED | OUTPATIENT
Start: 2021-04-26 | End: 2021-04-26

## 2021-04-26 RX ADMIN — Medication 81 MILLIGRAM(S): at 11:23

## 2021-04-26 RX ADMIN — LISINOPRIL 20 MILLIGRAM(S): 2.5 TABLET ORAL at 05:41

## 2021-04-26 RX ADMIN — LIDOCAINE 1 PATCH: 4 CREAM TOPICAL at 11:24

## 2021-04-26 RX ADMIN — LIDOCAINE 1 PATCH: 4 CREAM TOPICAL at 23:32

## 2021-04-26 RX ADMIN — AMLODIPINE BESYLATE 10 MILLIGRAM(S): 2.5 TABLET ORAL at 05:40

## 2021-04-26 RX ADMIN — INSULIN GLARGINE 35 UNIT(S): 100 INJECTION, SOLUTION SUBCUTANEOUS at 21:57

## 2021-04-26 RX ADMIN — ATORVASTATIN CALCIUM 80 MILLIGRAM(S): 80 TABLET, FILM COATED ORAL at 21:56

## 2021-04-26 RX ADMIN — LISINOPRIL 20 MILLIGRAM(S): 2.5 TABLET ORAL at 17:22

## 2021-04-26 RX ADMIN — METFORMIN HYDROCHLORIDE 500 MILLIGRAM(S): 850 TABLET ORAL at 17:22

## 2021-04-26 RX ADMIN — Medication 2 GRAM(S): at 17:22

## 2021-04-26 RX ADMIN — GABAPENTIN 300 MILLIGRAM(S): 400 CAPSULE ORAL at 17:22

## 2021-04-26 RX ADMIN — Medication 1 APPLICATION(S): at 20:07

## 2021-04-26 RX ADMIN — Medication 8 UNIT(S): at 13:04

## 2021-04-26 RX ADMIN — GABAPENTIN 600 MILLIGRAM(S): 400 CAPSULE ORAL at 21:57

## 2021-04-26 RX ADMIN — LIDOCAINE 1 PATCH: 4 CREAM TOPICAL at 17:18

## 2021-04-26 RX ADMIN — Medication 2 GRAM(S): at 05:40

## 2021-04-26 RX ADMIN — ENOXAPARIN SODIUM 40 MILLIGRAM(S): 100 INJECTION SUBCUTANEOUS at 11:23

## 2021-04-26 RX ADMIN — PANTOPRAZOLE SODIUM 40 MILLIGRAM(S): 20 TABLET, DELAYED RELEASE ORAL at 05:40

## 2021-04-26 RX ADMIN — CLOPIDOGREL BISULFATE 75 MILLIGRAM(S): 75 TABLET, FILM COATED ORAL at 11:23

## 2021-04-26 RX ADMIN — Medication 13 UNIT(S): at 11:16

## 2021-04-26 RX ADMIN — Medication 1 APPLICATION(S): at 06:38

## 2021-04-26 RX ADMIN — Medication 650 MILLIGRAM(S): at 13:07

## 2021-04-26 RX ADMIN — Medication 650 MILLIGRAM(S): at 06:39

## 2021-04-26 RX ADMIN — Medication 650 MILLIGRAM(S): at 05:39

## 2021-04-26 RX ADMIN — Medication 650 MILLIGRAM(S): at 13:40

## 2021-04-26 RX ADMIN — Medication 10 UNIT(S): at 17:21

## 2021-04-26 RX ADMIN — GABAPENTIN 300 MILLIGRAM(S): 400 CAPSULE ORAL at 05:40

## 2021-04-26 NOTE — PROGRESS NOTE ADULT - ASSESSMENT
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    #L Lateral Medullary CVA  -comprehensive rehab program -PT/OT/SLP per rehab team  -Pain management, bowel regimen per rehab   -ASA and Plavix followed by ASA monotherapy, last dose of Plavix will be 5/5/21  -Lipitor 80mg QHS, lovaza  -monitor LFTs  -LON 4/21/21: no cardiac source of embolism  -F/u neuro outpatient, Dr. Owen    #HTN, uncontrolled  - lisinopril 20mg BID  - norvasc increased 5mg to 10mg 4/25/21, received first increased dosage today. Would recommend watching for a day as can take time to reach steady state. Will reassess tomorrow.   - can give PRN hydralazine for breakthrough inc systolic >170.     # Headaches  doubt related to hypertension with systolic 150s-160s.  - repeat CT head wnl    #T2DM, uncontrolled - HbA1c 13.9  -patient stable on current insulin regimen with appropriate POC glucose. Will decrease admelog with starting metformin.  -Lantus 35un qhs + Admelog 10U TID AC.  -start metformin5 500 mg w/ dinner. Slow titration up due to GI intolerability in the past.   -FS and ISS  -Gabapentin per rehab    # Bilateral lower extremity hypopigmented macules   appears benign, likely related to sun exposure. Not infectious and pt is asymptomatic. Seems consistent with idiopathic guttate hypomelanosis.   - monitor.    DVT ppx - Lovenox, SCDs

## 2021-04-26 NOTE — PROVIDER CONTACT NOTE (OTHER) - BACKGROUND
history of uncontrolled diabetes . A1c=13.9.
hx diabetes mellitus, BG below 100 before bed on 4/21, Lantus reduced from 45 units to 35 units
s/p CVA with uncontrolled Diabetes.

## 2021-04-26 NOTE — PROGRESS NOTE ADULT - ASSESSMENT
Assessment/Plan:  RUFINO MARTINEZ is a 65 y.o. 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        Comprehensive Multidisciplinary Rehab Program:  - Start comprehensive rehab program, PT/OT/SLP 3 hours a day, 5 days a week.  - Precautions: falls    #L Lateral Medullary CVA  - Continue ASA and Plavix. Last dose of Plavix 5/5/21  - Continue Lipitor 80mg QHS, Fish oil  - OLN 4/21/21: no cardiac source of embolism  - F/u neuro outpatient, Dr. Owen  - Due to severe headache, ordered CTH stat.    #HTN  --BP elevated  --Continue Amlodipine 10mg daily  - Lisinopril 20mg BID  -SBP goal 110-140  - F/u hospitalist for additional HTN med    #DM  - Lantus decreased to 35U QHS due to low FS  - Continue Admelog 15U TID AC  - FS and ISS  - Monitor-- hospitalist following and will adjust PRN    Sleep:   - Maintain quiet hours and low stim environment.  - Melatonin PRN to maximize participation in therapy during the day.   - Monitor sleep logs    Pain Management:  - Tylenol PRN, Gabapentin, Tramadol (has nausea with high dose), Lidocaine patch  - Increase Gabapentin 300/300/600 due to continued pain in RLE  - Avoid sedating medications that may interfere with cognitive recovery    GI/Bowel:  - At risk for constipation due to neurologic diagnosis, immobility and/or medication use  - Senna QHS, Miralax PRN Daily  - GI ppx: Protonix    /Bladder:   - At risk for incontinence and retention due to neurologic diagnosis and limited mobility  -  patient voids independently--continent with low PVR      DVT ppx:  - Lovenox  - SCDs   Assessment/Plan:  RUFINO MARTINEZ is a 65 y.o. 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        Comprehensive Multidisciplinary Rehab Program:  - Start comprehensive rehab program, PT/OT/SLP 3 hours a day, 5 days a week.  - Precautions: falls    #L Lateral Medullary CVA  - Continue ASA and Plavix. Last dose of Plavix 5/5/21  - Continue Lipitor 80mg QHS, Fish oil  - LON 4/21/21: no cardiac source of embolism  - F/u neuro outpatient, Dr. Owen  - Due to severe headache, ordered CTH stat.    #HTN  --BP elevated  --Continue Amlodipine 10mg daily  - Lisinopril 20mg BID  -SBP goal 110-140  - F/u hospitalist for additional HTN med    #DM  - Lantus decreased to 35U QHS due to low FS  - Continue Admelog 13U TID AC  - FS and ISS  - Monitor-- hospitalist following and will adjust PRN    Sleep:   - Maintain quiet hours and low stim environment.  - Melatonin PRN to maximize participation in therapy during the day.   - Monitor sleep logs    Pain Management:  - Tylenol PRN, Gabapentin, Tramadol (has nausea with high dose), Lidocaine patch  - Increase Gabapentin 300/300/600 due to continued pain in RLE  - Avoid sedating medications that may interfere with cognitive recovery    GI/Bowel:  - At risk for constipation due to neurologic diagnosis, immobility and/or medication use  - Senna QHS, Miralax PRN Daily  - GI ppx: Protonix    /Bladder:   - At risk for incontinence and retention due to neurologic diagnosis and limited mobility  -  patient voids independently--continent with low PVR      DVT ppx:  - Lovenox  - SCDs   Assessment/Plan:  RUFINO MARTINEZ is a 65 y.o. 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        Comprehensive Multidisciplinary Rehab Program:  - Start comprehensive rehab program, PT/OT/SLP 3 hours a day, 5 days a week.  - Precautions: falls    #L Lateral Medullary CVA  - Continue ASA and Plavix. Last dose of Plavix 5/5/21  - Continue Lipitor 80mg QHS, Fish oil  - LON 4/21/21: no cardiac source of embolism  - F/u neuro outpatient, Dr. Owen  - Due to severe headache, ordered CTH stat. CTH stable. Likely vestibulo-ocular component.    #HTN  --BP elevated  --Continue Amlodipine 10mg daily  - Lisinopril 20mg BID  -SBP goal 110-140  - F/u hospitalist for additional HTN med    #DM  - Lantus decreased to 35U QHS due to low FS  - Continue Admelog 13U TID AC  - FS and ISS  - Monitor-- hospitalist following and will adjust PRN    Sleep:   - Maintain quiet hours and low stim environment.  - Melatonin PRN to maximize participation in therapy during the day.   - Monitor sleep logs    Pain Management:  - Tylenol PRN, Gabapentin, Tramadol (has nausea with high dose), Lidocaine patch  - Increase Gabapentin 300/300/600 due to continued pain in RLE  - Avoid sedating medications that may interfere with cognitive recovery    GI/Bowel:  - At risk for constipation due to neurologic diagnosis, immobility and/or medication use  - Senna QHS, Miralax PRN Daily  - GI ppx: Protonix    /Bladder:   - At risk for incontinence and retention due to neurologic diagnosis and limited mobility  -  patient voids independently--continent with low PVR      DVT ppx:  - Lovenox  - SCDs   Assessment/Plan:  RUFINO MARTINEZ is a 65 y.o. 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        Comprehensive Multidisciplinary Rehab Program:  - Start comprehensive rehab program, PT/OT/SLP 3 hours a day, 5 days a week.  - Precautions: falls    #L Lateral Medullary CVA  - Continue ASA and Plavix. Last dose of Plavix 5/5/21  - Continue Lipitor 80mg QHS, Fish oil  - LON 4/21/21: no cardiac source of embolism  - F/u neuro outpatient, Dr. Owen  - Due to severe headache, ordered CTH stat. CTH stable. Likely vestibulo-ocular component.    #HTN  --BP elevated  --Continue Amlodipine 10mg daily  - Lisinopril 20mg BID  -SBP goal 110-140  - F/u hospitalist for additional HTN med    #DM  - Lantus decreased to 35U QHS due to low FS  - Continue Admelog 13U TID AC  - FS and ISS  - Monitor-- hospitalist following and will adjust PRN    Sleep:   - Maintain quiet hours and low stim environment.  - Melatonin PRN to maximize participation in therapy during the day.   - Monitor sleep logs    Pain Management:  - Tylenol PRN, Gabapentin, Tramadol (has nausea with high dose), Lidocaine patch  - Increase Gabapentin 300/300/600 due to continued pain in RLE  - Avoid sedating medications that may interfere with cognitive recovery    GI/Bowel:  - At risk for constipation due to neurologic diagnosis, immobility and/or medication use  - Senna QHS, Miralax PRN Daily  - GI ppx: Protonix    /Bladder:   - At risk for incontinence and retention due to neurologic diagnosis and limited mobility  -  patient voids independently--continent with low PVR      DVT ppx:  - Lovenox  - SCDs      Dysphagia r/o aspiration  - [facility] [place]   Assessment/Plan:  RUFINO MARTINEZ is a 65 y.o. 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        Comprehensive Multidisciplinary Rehab Program:  - Contt comprehensive rehab program, PT/OT/SLP 3 hours a day, 5 days a week.  - Precautions: falls    #L Lateral Medullary CVA  - Continue ASA and Plavix. Last dose of Plavix 5/5/21  - Continue Lipitor 80mg QHS, Fish oil  - LON 4/21/21: no cardiac source of embolism  - F/u neuro outpatient, Dr. Owen  - Due to severe headache, ordered CTH stat. CTH stable. Likely vestibulo-ocular component.    #HTN  --BP elevated--can be contributing to H/A  --Continue Amlodipine 10mg daily  - Lisinopril 20mg BID  -SBP goal 110-140  - F/u hospitalist for additional HTN med    #DM  - Lantus  35U QHS   -- and 92 premeal  -decrease Admelog to 6U TID AC-  - FS and ISS  - Monitor-- hospitalist following and will adjust PRN      Pain Management:  - Tylenol PRN, Gabapentin, Tramadol (has nausea with high dose), Lidocaine patch  -Cont. Gabapentin 300/300/600 mg  - Avoid sedating medications that may interfere with cognitive recovery    GI/Bowel:  - At risk for constipation due to neurologic diagnosis, immobility and/or medication use  - Senna QHS, Miralax PRN Daily  - GI ppx: Protonix    /Bladder:   - At risk for incontinence and retention due to neurologic diagnosis and limited mobility  -  patient voids independently--continent with low PVR      DVT ppx:  - Lovenox  - SCDs      Dysphagia r/o aspiration  - MBS

## 2021-04-26 NOTE — PROGRESS NOTE ADULT - SUBJECTIVE AND OBJECTIVE BOX
Patient is a 65y old  Female who presents with a chief complaint of L lateral medullary CVA  1.4 (26 Apr 2021 10:19)      24 HOUR EVENTS:  No overnight events reported.     SUBJECTIVE:  Patient seen and examined at bedside. She complains of a "bad headache." Tramdol caused her nausea today. Tylenol helps with the pain. She feels like her feet are "sensitive" and touching her fingers feels like "shocks." It used to be her whole hand, but now has improved to the fingertips.    Pt also just noted areas of hypopigmentation on b/l LEs. Asymptomatic.     ALLERGIES:  No Known Allergies    MEDICATIONS  (STANDING):  amLODIPine   Tablet 10 milliGRAM(s) Oral daily  AQUAPHOR (petrolatum Ointment) 1 Application(s) Topical two times a day  aspirin enteric coated 81 milliGRAM(s) Oral daily  atorvastatin 80 milliGRAM(s) Oral at bedtime  clopidogrel Tablet 75 milliGRAM(s) Oral daily  dextrose 40% Gel 15 Gram(s) Oral once  dextrose 5%. 1000 milliLiter(s) (50 mL/Hr) IV Continuous <Continuous>  dextrose 5%. 1000 milliLiter(s) (100 mL/Hr) IV Continuous <Continuous>  dextrose 50% Injectable 25 Gram(s) IV Push once  dextrose 50% Injectable 12.5 Gram(s) IV Push once  dextrose 50% Injectable 25 Gram(s) IV Push once  enoxaparin Injectable 40 milliGRAM(s) SubCutaneous daily  gabapentin 300 milliGRAM(s) Oral two times a day  gabapentin 600 milliGRAM(s) Oral at bedtime  glucagon  Injectable 1 milliGRAM(s) IntraMuscular once  insulin glargine Injectable (LANTUS) 35 Unit(s) SubCutaneous at bedtime  insulin lispro (ADMELOG) corrective regimen sliding scale   SubCutaneous three times a day before meals  insulin lispro (ADMELOG) corrective regimen sliding scale   SubCutaneous at bedtime  insulin lispro Injectable (ADMELOG) 13 Unit(s) SubCutaneous three times a day before meals  lidocaine   Patch 1 Patch Transdermal every 24 hours  lisinopril 20 milliGRAM(s) Oral two times a day  omega-3-Acid Ethyl Esters 2 Gram(s) Oral two times a day  pantoprazole    Tablet 40 milliGRAM(s) Oral before breakfast    MEDICATIONS  (PRN):  acetaminophen   Tablet .. 650 milliGRAM(s) Oral every 6 hours PRN Temp greater or equal to 38C (100.4F), Mild Pain (1 - 3)  ondansetron    Tablet 4 milliGRAM(s) Oral every 6 hours PRN Nausea and/or Vomiting  senna 2 Tablet(s) Oral at bedtime PRN Constipation  traMADol 25 milliGRAM(s) Oral every 6 hours PRN Severe Pain (7 - 10)    Vital Signs Last 24 Hrs  T(F): 98 (26 Apr 2021 08:03), Max: 98 (26 Apr 2021 08:03)  HR: 75 (26 Apr 2021 13:08) (75 - 89)  BP: 144/66 (26 Apr 2021 13:08) (144/66 - 162/80)  RR: 16 (26 Apr 2021 08:03) (16 - 16)  SpO2: 100% (26 Apr 2021 08:03) (98% - 100%)  I&O's Summary    PHYSICAL EXAM:  General: NAD, A/O x 3  ENT: Moist mucous membranes, no thrush  Neck: Supple, No JVD  Lungs: Clear to auscultation bilaterally, good air entry, non-labored breathing  Cardio: RRR, S1/S2, No murmur  Abdomen: Soft, Nontender, Nondistended; Bowel sounds present  Extremities: No calf tenderness, No pitting edema.  Integumentary: areas of hypopigmented macules on b/l LEs below the knee.     LABS:                        12.9   5.44  )-----------( 252      ( 26 Apr 2021 06:58 )             40.4     04-26    140  |  105  |  15  ----------------------------<  128  3.8   |  27  |  0.59    Ca    9.8      26 Apr 2021 06:58    TPro  7.7  /  Alb  3.1  /  TBili  0.3  /  DBili  x   /  AST  70  /  ALT  78  /  AlkPhos  86  04-26        eGFR if Non African American: 97 mL/min/1.73M2 (04-26-21 @ 06:58)  eGFR if : 113 mL/min/1.73M2 (04-26-21 @ 06:58)      04-14 Chol 206 mg/dL LDL -- HDL 37 mg/dL Trig 195 mg/dL      POCT Blood Glucose.: 92 mg/dL (26 Apr 2021 11:20)  POCT Blood Glucose.: 111 mg/dL (26 Apr 2021 08:09)  POCT Blood Glucose.: 128 mg/dL (25 Apr 2021 21:05)  POCT Blood Glucose.: 133 mg/dL (25 Apr 2021 17:02)      RADIOLOGY & ADDITIONAL TESTS:      < from: CT Head No Cont (04.26.21 @ 10:10) >  IMPRESSION:    Previously seen tiny posterolateral acute medullary infarct is not well appreciated on the current examination.    No hydrocephalus, mass effect, midline shift, acute intracranial hemorrhage, or brain edema.    Periapical lucency surrounding the left first maxillary premolar, correlate for the presence of periapical/periodontal disease..    < end of copied text >    Care Discussed with Consultants/Other Providers:   PMR, Dr. Vargas and Dr. Briggs  Diabetic nurse educator, Jeannie Casarez

## 2021-04-26 NOTE — PROGRESS NOTE ADULT - SUBJECTIVE AND OBJECTIVE BOX
Patient is a 65y old  Female who presents with a chief complaint of L lateral medullary CVA  1.4 (22 Apr 2021 07:46)      HPI:  65F hx of HTN, HLD, T2DM off meds since 2018 and has not followed up with PMD since then presenting with R sided numbness. Pt related she has not felt well which started about 4 days ago and felt her balance was off and occasional dizziness. Today after waking up, she took a shower at 11AM and at the time noticed exquisite painful sensitivity in the R lower extremity and throughout the day noted numbness in the RLE and RUE and R side of face and even on the R torso. She went to get a pedicure at around 2 PM and noticed again exquisite painful sensitivity in the R foot when placed in water. She did not have slurring of speech, visual issues, dysphagia, or focal weakness. She went to see PMD and advised ED visit to r/o CVA. In ED, out of window for tPA. Afebrile BP: 187/104 P: 74 sat 100% on RA. CT head and CT angio neg for acute infarct but with focal stenosis in bl M2 MCA and bl PCA. Blood glucose was 511. S/p IV Labetalol 20mg in ED and 6U regular insulin. Pt feels well otherwise - wanted to sign out AMA because she didn't want to be moved upstairs where her mother had passed.     CTH unremarkable. 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 CTA revealed multiple B/L severe focal stenoses prox M2 branch and both MCAs B/L in PCA. As per neuro, continue high dose statin, ASA and Plavix for three weeks (started 4/14/21) then just ASA monotherapy. Cardio rec LON, done 4/21/21 which showed no cardiac source of embolism.    Patient was medically stable for discharge to [place] for acute rehab 4/21/21.   (21 Apr 2021 15:46)      PAST MEDICAL & SURGICAL HISTORY:  HTN (hypertension)    HLD (hyperlipidemia)    Diabetes mellitus    History of ankle surgery        Allergies    No Known Allergies    Intolerances      SUBJECTIVE: Slept well overnight. She continues to have headache, ok during the weekend but 9/10 this AM. After Tylenol, pain was 7/10 and tolerable. Headache is bilateral and on sides of head and back of head. She is not able to watch TV for long due to eye strain and sometimes associated with headache. She c/o nausea with higher dose of Tramadol.      Vital Signs Last 24 Hrs  T(C): 36.7 (26 Apr 2021 08:03), Max: 36.7 (26 Apr 2021 08:03)  T(F): 98 (26 Apr 2021 08:03), Max: 98 (26 Apr 2021 08:03)  HR: 76 (26 Apr 2021 08:03) (75 - 89)  BP: 162/76 (26 Apr 2021 08:03) (154/82 - 162/80)  BP(mean): --  RR: 16 (26 Apr 2021 08:03) (16 - 16)  SpO2: 100% (26 Apr 2021 08:03) (98% - 100%)      Gen - NAD, Comfortable  HEENT - NCAT, EOMI, MMM  Neck - Supple, No limited ROM  Pulm - CTAB, No wheeze, No rhonchi, No crackles  Cardiovascular - RRR, S1S2, No m/r/g  Abdomen - Soft, NT/ND, +BS  Extremities - No C/C/E, No calf tenderness  Neuro-     Cognitive - AAOx3     Communication - Fluent, mild dysarthria     Attention: Intact         Memory: Recall 3 objects immediate and 3 min later with cueing x1        Cranial Nerves - mild left facial droop, sensory impairment, dysarthria     Motor -                    LEFT    UE - ShAB 5/5, EF 5/5, EE 5/5, WE 5/5,  5/5                    RIGHT UE - ShAB 5/5, EF 5/5, EE 5/5, WE 5/5,  5/5                    LEFT    LE - HF 5/5, KE 5/5, DF 5/5, PF 5/5                    RIGHT LE - HF 5/5, KE 5/5, DF 5/5, PF 5/5        Sensory - increased sensitivity to right upper and lower  extremity and right low back     Coordination - FTN intact     Tone - normal  Psychiatric - Mood stable, Affect WNL  Skin: Intact      RECENT LABS:                                     12.9   5.44  )-----------( 252      ( 26 Apr 2021 06:58 )             40.4     04-26    140  |  105  |  15  ----------------------------<  128<H>  3.8   |  27  |  0.59    Ca    9.8      26 Apr 2021 06:58    TPro  7.7  /  Alb  3.1<L>  /  TBili  0.3  /  DBili  x   /  AST  70<H>  /  ALT  78<H>  /  AlkPhos  86  04-26      CAPILLARY BLOOD GLUCOSE      POCT Blood Glucose.: 111 mg/dL (26 Apr 2021 08:09)  POCT Blood Glucose.: 128 mg/dL (25 Apr 2021 21:05)  POCT Blood Glucose.: 133 mg/dL (25 Apr 2021 17:02)  POCT Blood Glucose.: 181 mg/dL (25 Apr 2021 11:51)          MEDICATIONS  (STANDING):  amLODIPine   Tablet 10 milliGRAM(s) Oral daily  AQUAPHOR (petrolatum Ointment) 1 Application(s) Topical two times a day  aspirin enteric coated 81 milliGRAM(s) Oral daily  atorvastatin 80 milliGRAM(s) Oral at bedtime  clopidogrel Tablet 75 milliGRAM(s) Oral daily  dextrose 40% Gel 15 Gram(s) Oral once  dextrose 5%. 1000 milliLiter(s) (50 mL/Hr) IV Continuous <Continuous>  dextrose 5%. 1000 milliLiter(s) (100 mL/Hr) IV Continuous <Continuous>  dextrose 50% Injectable 25 Gram(s) IV Push once  dextrose 50% Injectable 12.5 Gram(s) IV Push once  dextrose 50% Injectable 25 Gram(s) IV Push once  enoxaparin Injectable 40 milliGRAM(s) SubCutaneous daily  gabapentin 300 milliGRAM(s) Oral two times a day  gabapentin 600 milliGRAM(s) Oral at bedtime  glucagon  Injectable 1 milliGRAM(s) IntraMuscular once  insulin glargine Injectable (LANTUS) 35 Unit(s) SubCutaneous at bedtime  insulin lispro (ADMELOG) corrective regimen sliding scale   SubCutaneous three times a day before meals  insulin lispro (ADMELOG) corrective regimen sliding scale   SubCutaneous at bedtime  insulin lispro Injectable (ADMELOG) 13 Unit(s) SubCutaneous three times a day before meals  lidocaine   Patch 1 Patch Transdermal every 24 hours  lisinopril 20 milliGRAM(s) Oral two times a day  omega-3-Acid Ethyl Esters 2 Gram(s) Oral two times a day  pantoprazole    Tablet 40 milliGRAM(s) Oral before breakfast    MEDICATIONS  (PRN):  acetaminophen   Tablet .. 650 milliGRAM(s) Oral every 6 hours PRN Temp greater or equal to 38C (100.4F), Mild Pain (1 - 3)  ondansetron    Tablet 4 milliGRAM(s) Oral every 6 hours PRN Nausea and/or Vomiting  senna 2 Tablet(s) Oral at bedtime PRN Constipation  traMADol 25 milliGRAM(s) Oral every 6 hours PRN Severe Pain (7 - 10)                 Patient is a 65y old  Female who presents with a chief complaint of L lateral medullary CVA  1.4 (22 Apr 2021 07:46)      HPI:  65F hx of HTN, HLD, T2DM off meds since 2018 and has not followed up with PMD since then presenting with R sided numbness. Pt related she has not felt well which started about 4 days ago and felt her balance was off and occasional dizziness. Today after waking up, she took a shower at 11AM and at the time noticed exquisite painful sensitivity in the R lower extremity and throughout the day noted numbness in the RLE and RUE and R side of face and even on the R torso. She went to get a pedicure at around 2 PM and noticed again exquisite painful sensitivity in the R foot when placed in water. She did not have slurring of speech, visual issues, dysphagia, or focal weakness. She went to see PMD and advised ED visit to r/o CVA. In ED, out of window for tPA. Afebrile BP: 187/104 P: 74 sat 100% on RA. CT head and CT angio neg for acute infarct but with focal stenosis in bl M2 MCA and bl PCA. Blood glucose was 511. S/p IV Labetalol 20mg in ED and 6U regular insulin. Pt feels well otherwise - wanted to sign out AMA because she didn't want to be moved upstairs where her mother had passed.     CTH unremarkable. 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 CTA revealed multiple B/L severe focal stenoses prox M2 branch and both MCAs B/L in PCA. As per neuro, continue high dose statin, ASA and Plavix for three weeks (started 4/14/21) then just ASA monotherapy. Cardio rec LON, done 4/21/21 which showed no cardiac source of embolism.    Patient was medically stable for discharge to [place] for acute rehab 4/21/21.   (21 Apr 2021 15:46)      PAST MEDICAL & SURGICAL HISTORY:  HTN (hypertension)    HLD (hyperlipidemia)    Diabetes mellitus    History of ankle surgery        Allergies    No Known Allergies    Intolerances      SUBJECTIVE: Slept well overnight. She continues to have headache, ok during the weekend but 9/10 this AM. After Tylenol, pain was 7/10 and tolerable. Headache is bilateral and on sides of head and back of head. She is not able to watch TV for long due to eye strain and sometimes associated with headache. She c/o nausea with higher dose of Tramadol. Also c/o lightheadedness      Vital Signs Last 24 Hrs  T(C): 36.7 (26 Apr 2021 08:03), Max: 36.7 (26 Apr 2021 08:03)  T(F): 98 (26 Apr 2021 08:03), Max: 98 (26 Apr 2021 08:03)  HR: 76 (26 Apr 2021 08:03) (75 - 89)  BP: 162/76 (26 Apr 2021 08:03) (154/82 - 162/80)  BP(mean): --  RR: 16 (26 Apr 2021 08:03) (16 - 16)  SpO2: 100% (26 Apr 2021 08:03) (98% - 100%)      Gen - NAD, Comfortable  HEENT - NCAT, EOMI, MMM, no saccades  Neck - Supple, No limited ROM  Pulm - CTAB, No wheeze, No rhonchi, No crackles  Cardiovascular - RRR, S1S2, No m/r/g  Abdomen - Soft, NT/ND, +BS  Extremities - No C/C/E, No calf tenderness  Neuro-     Cognitive - AAOx3     Communication - Fluent, mild dysarthria     Attention: Intact         Memory: Recall 3 objects immediate and 3 min later with cueing x1        Cranial Nerves - mild left facial droop, sensory impairment, dysarthria, +vestibulo-ocular reflex     Motor -                    LEFT    UE - ShAB 5/5, EF 5/5, EE 5/5, WE 5/5,  5/5                    RIGHT UE - ShAB 5/5, EF 5/5, EE 5/5, WE 5/5,  5/5                    LEFT    LE - HF 5/5, KE 5/5, DF 5/5, PF 5/5                    RIGHT LE - HF 5/5, KE 5/5, DF 5/5, PF 5/5        Sensory - increased sensitivity to right upper and lower  extremity and right low back     Coordination - FTN intact     Tone - normal  Psychiatric - Mood stable, Affect WNL  Skin: Intact      RECENT LABS:                                     12.9   5.44  )-----------( 252      ( 26 Apr 2021 06:58 )             40.4     04-26    140  |  105  |  15  ----------------------------<  128<H>  3.8   |  27  |  0.59    Ca    9.8      26 Apr 2021 06:58    TPro  7.7  /  Alb  3.1<L>  /  TBili  0.3  /  DBili  x   /  AST  70<H>  /  ALT  78<H>  /  AlkPhos  86  04-26      CAPILLARY BLOOD GLUCOSE      POCT Blood Glucose.: 111 mg/dL (26 Apr 2021 08:09)  POCT Blood Glucose.: 128 mg/dL (25 Apr 2021 21:05)  POCT Blood Glucose.: 133 mg/dL (25 Apr 2021 17:02)  POCT Blood Glucose.: 181 mg/dL (25 Apr 2021 11:51)          MEDICATIONS  (STANDING):  amLODIPine   Tablet 10 milliGRAM(s) Oral daily  AQUAPHOR (petrolatum Ointment) 1 Application(s) Topical two times a day  aspirin enteric coated 81 milliGRAM(s) Oral daily  atorvastatin 80 milliGRAM(s) Oral at bedtime  clopidogrel Tablet 75 milliGRAM(s) Oral daily  dextrose 40% Gel 15 Gram(s) Oral once  dextrose 5%. 1000 milliLiter(s) (50 mL/Hr) IV Continuous <Continuous>  dextrose 5%. 1000 milliLiter(s) (100 mL/Hr) IV Continuous <Continuous>  dextrose 50% Injectable 25 Gram(s) IV Push once  dextrose 50% Injectable 12.5 Gram(s) IV Push once  dextrose 50% Injectable 25 Gram(s) IV Push once  enoxaparin Injectable 40 milliGRAM(s) SubCutaneous daily  gabapentin 300 milliGRAM(s) Oral two times a day  gabapentin 600 milliGRAM(s) Oral at bedtime  glucagon  Injectable 1 milliGRAM(s) IntraMuscular once  insulin glargine Injectable (LANTUS) 35 Unit(s) SubCutaneous at bedtime  insulin lispro (ADMELOG) corrective regimen sliding scale   SubCutaneous three times a day before meals  insulin lispro (ADMELOG) corrective regimen sliding scale   SubCutaneous at bedtime  insulin lispro Injectable (ADMELOG) 13 Unit(s) SubCutaneous three times a day before meals  lidocaine   Patch 1 Patch Transdermal every 24 hours  lisinopril 20 milliGRAM(s) Oral two times a day  omega-3-Acid Ethyl Esters 2 Gram(s) Oral two times a day  pantoprazole    Tablet 40 milliGRAM(s) Oral before breakfast    MEDICATIONS  (PRN):  acetaminophen   Tablet .. 650 milliGRAM(s) Oral every 6 hours PRN Temp greater or equal to 38C (100.4F), Mild Pain (1 - 3)  ondansetron    Tablet 4 milliGRAM(s) Oral every 6 hours PRN Nausea and/or Vomiting  senna 2 Tablet(s) Oral at bedtime PRN Constipation  traMADol 25 milliGRAM(s) Oral every 6 hours PRN Severe Pain (7 - 10)                 Patient is a 65y old  Female who presents with a chief complaint of L lateral medullary CVA  1.4 (22 Apr 2021 07:46)      HPI:  65F hx of HTN, HLD, T2DM off meds since 2018 and has not followed up with PMD since then presenting with R sided numbness. Pt related she has not felt well which started about 4 days ago and felt her balance was off and occasional dizziness. Today after waking up, she took a shower at 11AM and at the time noticed exquisite painful sensitivity in the R lower extremity and throughout the day noted numbness in the RLE and RUE and R side of face and even on the R torso. She went to get a pedicure at around 2 PM and noticed again exquisite painful sensitivity in the R foot when placed in water. She did not have slurring of speech, visual issues, dysphagia, or focal weakness. She went to see PMD and advised ED visit to r/o CVA. In ED, out of window for tPA. Afebrile BP: 187/104 P: 74 sat 100% on RA. CT head and CT angio neg for acute infarct but with focal stenosis in bl M2 MCA and bl PCA. Blood glucose was 511. S/p IV Labetalol 20mg in ED and 6U regular insulin. Pt feels well otherwise - wanted to sign out AMA because she didn't want to be moved upstairs where her mother had passed.     CTH unremarkable. 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 CTA revealed multiple B/L severe focal stenoses prox M2 branch and both MCAs B/L in PCA. As per neuro, continue high dose statin, ASA and Plavix for three weeks (started 4/14/21) then just ASA monotherapy. Cardio rec LON, done 4/21/21 which showed no cardiac source of embolism.    Patient was medically stable for discharge to [place] for acute rehab 4/21/21.   (21 Apr 2021 15:46)      PAST MEDICAL & SURGICAL HISTORY:  HTN (hypertension)    HLD (hyperlipidemia)    Diabetes mellitus    History of ankle surgery        Allergies    No Known Allergies    Intolerances      SUBJECTIVE: Slept well overnight. She continues to have headache, ok during the weekend but 9/10 this AM. After Tylenol, pain was 7/10 and tolerable. Headache is bilateral and on sides of head and back of head. She is not able to watch TV for long due to eye strain and sometimes associated with headache. She c/o nausea with higher dose of Tramadol. Also c/o lightheadedness      Vital Signs Last 24 Hrs  T(C): 36.7 (26 Apr 2021 08:03), Max: 36.7 (26 Apr 2021 08:03)  T(F): 98 (26 Apr 2021 08:03), Max: 98 (26 Apr 2021 08:03)  HR: 76 (26 Apr 2021 08:03) (75 - 89)  BP: 162/76 (26 Apr 2021 08:03) (154/82 - 162/80)  BP(mean): --  RR: 16 (26 Apr 2021 08:03) (16 - 16)  SpO2: 100% (26 Apr 2021 08:03) (98% - 100%)      Gen - NAD, Comfortable  HEENT - NCAT, EOMI, MMM, no saccades  Neck - Supple, No limited ROM  Pulm - CTAB, No wheeze, No rhonchi, No crackles  Cardiovascular - RRR, S1S2, No m/r/g  Abdomen - Soft, NT/ND, +BS  Extremities - No C/C/E, No calf tenderness  Neuro-     Cognitive - AAOx3     Communication - Fluent, mild dysarthria     Attention: Intact         Memory: Recall 3 objects immediate and 3 min later with cueing x1        Cranial Nerves - PERRLA, EOMI, mild left facial droop, sensory impairment, dysarthria,     vestibulo-ocular reflex-- +dizziness with head rotation in horizontal or vertical motion when focusing on stationary object     +mild impairment with saccadic eye motion       Motor -                    LEFT    UE - ShAB 5/5, EF 5/5, EE 5/5, WE 5/5,  5/5                    RIGHT UE - ShAB 5/5, EF 5/5, EE 5/5, WE 5/5,  5/5                    LEFT    LE - HF 5/5, KE 5/5, DF 5/5, PF 5/5                    RIGHT LE - HF 5/5, KE 5/5, DF 5/5, PF 5/5        Sensory - increased sensitivity to right upper and lower  extremity and right low back     Coordination - FTN intact     Tone - normal    MSK: Cervical ROM WNL, No cervical spinal or paraspinal tenderness  Psychiatric - Mood stable, Affect WNL  Skin: Intact      RECENT LABS:                                     12.9   5.44  )-----------( 252      ( 26 Apr 2021 06:58 )             40.4     04-26    140  |  105  |  15  ----------------------------<  128<H>  3.8   |  27  |  0.59    Ca    9.8      26 Apr 2021 06:58    TPro  7.7  /  Alb  3.1<L>  /  TBili  0.3  /  DBili  x   /  AST  70<H>  /  ALT  78<H>  /  AlkPhos  86  04-26      CAPILLARY BLOOD GLUCOSE      POCT Blood Glucose.: 111 mg/dL (26 Apr 2021 08:09)  POCT Blood Glucose.: 128 mg/dL (25 Apr 2021 21:05)  POCT Blood Glucose.: 133 mg/dL (25 Apr 2021 17:02)  POCT Blood Glucose.: 181 mg/dL (25 Apr 2021 11:51)          MEDICATIONS  (STANDING):  amLODIPine   Tablet 10 milliGRAM(s) Oral daily  AQUAPHOR (petrolatum Ointment) 1 Application(s) Topical two times a day  aspirin enteric coated 81 milliGRAM(s) Oral daily  atorvastatin 80 milliGRAM(s) Oral at bedtime  clopidogrel Tablet 75 milliGRAM(s) Oral daily  dextrose 40% Gel 15 Gram(s) Oral once  dextrose 5%. 1000 milliLiter(s) (50 mL/Hr) IV Continuous <Continuous>  dextrose 5%. 1000 milliLiter(s) (100 mL/Hr) IV Continuous <Continuous>  dextrose 50% Injectable 25 Gram(s) IV Push once  dextrose 50% Injectable 12.5 Gram(s) IV Push once  dextrose 50% Injectable 25 Gram(s) IV Push once  enoxaparin Injectable 40 milliGRAM(s) SubCutaneous daily  gabapentin 300 milliGRAM(s) Oral two times a day  gabapentin 600 milliGRAM(s) Oral at bedtime  glucagon  Injectable 1 milliGRAM(s) IntraMuscular once  insulin glargine Injectable (LANTUS) 35 Unit(s) SubCutaneous at bedtime  insulin lispro (ADMELOG) corrective regimen sliding scale   SubCutaneous three times a day before meals  insulin lispro (ADMELOG) corrective regimen sliding scale   SubCutaneous at bedtime  insulin lispro Injectable (ADMELOG) 13 Unit(s) SubCutaneous three times a day before meals  lidocaine   Patch 1 Patch Transdermal every 24 hours  lisinopril 20 milliGRAM(s) Oral two times a day  omega-3-Acid Ethyl Esters 2 Gram(s) Oral two times a day  pantoprazole    Tablet 40 milliGRAM(s) Oral before breakfast    MEDICATIONS  (PRN):  acetaminophen   Tablet .. 650 milliGRAM(s) Oral every 6 hours PRN Temp greater or equal to 38C (100.4F), Mild Pain (1 - 3)  ondansetron    Tablet 4 milliGRAM(s) Oral every 6 hours PRN Nausea and/or Vomiting  senna 2 Tablet(s) Oral at bedtime PRN Constipation  traMADol 25 milliGRAM(s) Oral every 6 hours PRN Severe Pain (7 - 10)

## 2021-04-26 NOTE — PROVIDER CONTACT NOTE (OTHER) - SITUATION
Pt. blood glucose continues to be below 100, blood glucose=99, after apple juice given. 45 units of Lantus scheduled.
pt. blood glucose 85 15min after eating half of turkey sandwich. 45 Units of Lantus due at bedtime.
bedtime BG=96, pt. states she ate a snack around 7pm.
Blood glucose= 86, due for 35 units of Lantus before bed

## 2021-04-26 NOTE — PROVIDER CONTACT NOTE (MEDICATION) - BACKGROUND
history of DM type 2    Patient s/p  Left CVA   Blood sugar ws 111 at 7:30  Discussed with Hospitalist and later DR Dunn about giving the scheduled 13 units off lispro insulin  . After discussion the Hospitalist instructed the RN to administer  the 13 unit.  INsulin given as ordered  .

## 2021-04-26 NOTE — PROVIDER CONTACT NOTE (MEDICATION) - ASSESSMENT
Pre lunch  blood sugar 92  RN notified Dr Dunn  . Was instructed to only give 8 units lispro at lunch .

## 2021-04-27 LAB
ANION GAP SERPL CALC-SCNC: 8 MMOL/L — SIGNIFICANT CHANGE UP (ref 5–17)
BUN SERPL-MCNC: 16 MG/DL — SIGNIFICANT CHANGE UP (ref 7–23)
CALCIUM SERPL-MCNC: 9.3 MG/DL — SIGNIFICANT CHANGE UP (ref 8.4–10.5)
CHLORIDE SERPL-SCNC: 105 MMOL/L — SIGNIFICANT CHANGE UP (ref 96–108)
CO2 SERPL-SCNC: 27 MMOL/L — SIGNIFICANT CHANGE UP (ref 22–31)
CREAT SERPL-MCNC: 0.71 MG/DL — SIGNIFICANT CHANGE UP (ref 0.5–1.3)
GLUCOSE BLDC GLUCOMTR-MCNC: 108 MG/DL — HIGH (ref 70–99)
GLUCOSE BLDC GLUCOMTR-MCNC: 131 MG/DL — HIGH (ref 70–99)
GLUCOSE BLDC GLUCOMTR-MCNC: 157 MG/DL — HIGH (ref 70–99)
GLUCOSE BLDC GLUCOMTR-MCNC: 160 MG/DL — HIGH (ref 70–99)
GLUCOSE SERPL-MCNC: 156 MG/DL — HIGH (ref 70–99)
MAGNESIUM SERPL-MCNC: 1.7 MG/DL — SIGNIFICANT CHANGE UP (ref 1.6–2.6)
POTASSIUM SERPL-MCNC: 4.2 MMOL/L — SIGNIFICANT CHANGE UP (ref 3.5–5.3)
POTASSIUM SERPL-SCNC: 4.2 MMOL/L — SIGNIFICANT CHANGE UP (ref 3.5–5.3)
SODIUM SERPL-SCNC: 140 MMOL/L — SIGNIFICANT CHANGE UP (ref 135–145)

## 2021-04-27 PROCEDURE — 99232 SBSQ HOSP IP/OBS MODERATE 35: CPT

## 2021-04-27 RX ORDER — GABAPENTIN 400 MG/1
600 CAPSULE ORAL
Refills: 0 | Status: DISCONTINUED | OUTPATIENT
Start: 2021-04-27 | End: 2021-05-01

## 2021-04-27 RX ORDER — GABAPENTIN 400 MG/1
300 CAPSULE ORAL
Refills: 0 | Status: DISCONTINUED | OUTPATIENT
Start: 2021-04-28 | End: 2021-05-01

## 2021-04-27 RX ADMIN — METFORMIN HYDROCHLORIDE 500 MILLIGRAM(S): 850 TABLET ORAL at 17:09

## 2021-04-27 RX ADMIN — AMLODIPINE BESYLATE 10 MILLIGRAM(S): 2.5 TABLET ORAL at 05:08

## 2021-04-27 RX ADMIN — Medication 650 MILLIGRAM(S): at 05:07

## 2021-04-27 RX ADMIN — GABAPENTIN 600 MILLIGRAM(S): 400 CAPSULE ORAL at 12:41

## 2021-04-27 RX ADMIN — ENOXAPARIN SODIUM 40 MILLIGRAM(S): 100 INJECTION SUBCUTANEOUS at 12:03

## 2021-04-27 RX ADMIN — Medication 1 APPLICATION(S): at 05:09

## 2021-04-27 RX ADMIN — Medication 1 APPLICATION(S): at 17:10

## 2021-04-27 RX ADMIN — Medication 650 MILLIGRAM(S): at 06:07

## 2021-04-27 RX ADMIN — GABAPENTIN 300 MILLIGRAM(S): 400 CAPSULE ORAL at 05:08

## 2021-04-27 RX ADMIN — LIDOCAINE 1 PATCH: 4 CREAM TOPICAL at 17:24

## 2021-04-27 RX ADMIN — MAGNESIUM OXIDE 400 MG ORAL TABLET 400 MILLIGRAM(S): 241.3 TABLET ORAL at 08:27

## 2021-04-27 RX ADMIN — Medication 10 UNIT(S): at 07:57

## 2021-04-27 RX ADMIN — CLOPIDOGREL BISULFATE 75 MILLIGRAM(S): 75 TABLET, FILM COATED ORAL at 12:03

## 2021-04-27 RX ADMIN — Medication 2 GRAM(S): at 05:08

## 2021-04-27 RX ADMIN — LIDOCAINE 1 PATCH: 4 CREAM TOPICAL at 12:03

## 2021-04-27 RX ADMIN — Medication 10 UNIT(S): at 16:39

## 2021-04-27 RX ADMIN — LISINOPRIL 20 MILLIGRAM(S): 2.5 TABLET ORAL at 17:09

## 2021-04-27 RX ADMIN — INSULIN GLARGINE 35 UNIT(S): 100 INJECTION, SOLUTION SUBCUTANEOUS at 22:26

## 2021-04-27 RX ADMIN — Medication 10 UNIT(S): at 12:08

## 2021-04-27 RX ADMIN — ATORVASTATIN CALCIUM 80 MILLIGRAM(S): 80 TABLET, FILM COATED ORAL at 22:27

## 2021-04-27 RX ADMIN — LISINOPRIL 20 MILLIGRAM(S): 2.5 TABLET ORAL at 05:08

## 2021-04-27 RX ADMIN — Medication 2: at 16:39

## 2021-04-27 RX ADMIN — Medication 2 GRAM(S): at 17:09

## 2021-04-27 RX ADMIN — GABAPENTIN 600 MILLIGRAM(S): 400 CAPSULE ORAL at 22:26

## 2021-04-27 RX ADMIN — Medication 2: at 12:09

## 2021-04-27 RX ADMIN — Medication 81 MILLIGRAM(S): at 12:03

## 2021-04-27 RX ADMIN — PANTOPRAZOLE SODIUM 40 MILLIGRAM(S): 20 TABLET, DELAYED RELEASE ORAL at 05:09

## 2021-04-27 NOTE — PROGRESS NOTE ADULT - ASSESSMENT
Assessment/Plan:  RUFINO MARTINEZ is a 65 y.o. 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        Comprehensive Multidisciplinary Rehab Program:  - Contt comprehensive rehab program, PT/OT/SLP 3 hours a day, 5 days a week.  - Precautions: falls    #L Lateral Medullary CVA  - Continue ASA and Plavix. Last dose of Plavix 5/5/21  - Continue Lipitor 80mg QHS, Fish oil  - LON 4/21/21: no cardiac source of embolism  - F/u neuro outpatient, Dr. Owen  - Due to severe headache, ordered CTH stat. CTH stable. Likely vestibulo-ocular component.    #HTN  --BP elevated--can be contributing to H/A  --Continue Amlodipine 10mg daily  - Lisinopril 20mg BID  -SBP goal 110-140  - F/u hospitalist for additional HTN med    #DM  - Lantus 35U QHS   -- and 92 premeal  -decrease Admelog to 10U TID AC-  - FS and ISS  - Monitor-- hospitalist following and will adjust PRN      Pain Management:  - Tylenol PRN, Gabapentin, Tramadol (has nausea with high dose), Lidocaine patch  - Increase Gabapentin 300/600/600 mg  - Avoid sedating medications that may interfere with cognitive recovery    GI/Bowel:  - At risk for constipation due to neurologic diagnosis, immobility and/or medication use  - Senna QHS, Miralax PRN Daily  - GI ppx: Protonix    /Bladder:   - At risk for incontinence and retention due to neurologic diagnosis and limited mobility  -  patient voids independently--continent with low PVR      DVT ppx:  - Lovenox  - SCDs      Dysphagia r/o aspiration  - MBS recommended by SLP but patient refused.    IDT 4/27/21:  - SW: lives in a private house with 10STE, 8steps with 1 HR inside. She lives with 2 daughters, independent prior with no DME.  - OT: supervision eating, UBD, bathing, toilet transfers, toileting. CG shower transfer  - PT: close supervision-CG transfers, CG 16 steps  - SLP: regular/thins, EMST, mild dysarthria. Decreased attention. Patient refused MBS.  - Will need family training  - EDOD: 5/1/21 to home (per patient preference, over 4/30)   Assessment/Plan:  RUFINO MARTINEZ is a 65 y.o. 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        Comprehensive Multidisciplinary Rehab Program:  - Contt comprehensive rehab program, PT/OT/SLP 3 hours a day, 5 days a week.  - Precautions: falls    #L Lateral Medullary CVA  - Continue ASA and Plavix. Last dose of Plavix 5/5/21  - Continue Lipitor 80mg QHS, Fish oil  - LON 4/21/21: no cardiac source of embolism  - F/u neuro outpatient, Dr. Owen  - Due to severe headache, ordered CTH stat 4/26. CTH stable. Likely vestibulo-ocular component.  --Trial vestibular exercises as tolerated in PT    #HTN  --BP controlled  --Continue Amlodipine 10mg daily  - Lisinopril 20mg BID  -SBP goal 110-140  -  hospitalist following    #DM  - Lantus 35U QHS   -Admelog to 10U TID AC-  -Metformin   - FS and ISS  - Monitor-- hospitalist following and will adjust PRN      Pain Management:  - Tylenol PRN, Gabapentin, Tramadol (has nausea with high dose), Lidocaine patch  - Increase Gabapentin 300/600/600 mg  - Avoid sedating medications that may interfere with cognitive recovery    GI/Bowel:  - At risk for constipation due to neurologic diagnosis, immobility and/or medication use  - Senna QHS, Miralax PRN Daily  - GI ppx: Protonix    /Bladder:   - At risk for incontinence and retention due to neurologic diagnosis and limited mobility  -  patient voids independently--continent with low PVR      DVT ppx:  - Lovenox  - SCDs      Dysphagia r/o aspiration  - MBS recommended by SLP but patient refused.    IDT 4/27/21:  - SW: lives in a private house with 10STE, 8steps with 1 HR inside. She lives with 2 daughters, independent prior with no DME.  - OT: supervision eating, UBD, LBD, bathing, toilet transfers, toileting. CG shower transfer  - PT: close supervision-CG transfers & ambulation 150ft.; CG 16 steps  - SLP: regular/thins, EMST, mild dysarthria. Decreased attention. Patient refused MBS.  - Will need family training  - EDOD:  5/1/21 to home   Assessment/Plan:  RUFINO MARTINEZ is a 65 y.o. 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        Comprehensive Multidisciplinary Rehab Program:  - Contt comprehensive rehab program, PT/OT/SLP 3 hours a day, 5 days a week.  - Precautions: falls    #L Lateral Medullary CVA  - Continue ASA and Plavix. Last dose of Plavix 5/5/21  - Continue Lipitor 80mg QHS, Fish oil  - LON 4/21/21: no cardiac source of embolism  - F/u neuro outpatient, Dr. Owen  - Due to severe headache, ordered CTH stat 4/26. CTH stable. Likely vestibulo-ocular component.  --Trial vestibular exercises as tolerated in PT    #HTN  --BP controlled  --Continue Amlodipine 10mg daily  - Lisinopril 20mg BID  -SBP goal 110-140  -  hospitalist following    #DM  - Lantus 35U QHS   -Admelog to 10U TID AC-  -Metformin   - FS and ISS  - Monitor-- hospitalist following and will adjust PRN      Pain Management:  - Tylenol PRN, Gabapentin, Tramadol (has nausea with high dose), Lidocaine patch  - Increase Gabapentin 300/600/600 mg  --Daily headache-- Mag Ox for H/A ppx.  - Avoid sedating medications that may interfere with cognitive recovery    GI/Bowel:  - At risk for constipation due to neurologic diagnosis, immobility and/or medication use  - Senna QHS, Miralax PRN Daily  - GI ppx: Protonix    /Bladder:   - At risk for incontinence and retention due to neurologic diagnosis and limited mobility  -  patient voids independently--continent with low PVR      DVT ppx:  - Lovenox  - SCDs      Dysphagia r/o aspiration  - MBS recommended by SLP but patient refused.    IDT 4/27/21:  - SW: lives in a private house with 10STE, 8steps with 1 HR inside. She lives with 2 daughters, independent prior with no DME.  - OT: supervision eating, UBD, LBD, bathing, toilet transfers, toileting. CG shower transfer  - PT: close supervision-CG transfers & ambulation 150ft.; CG 16 steps  - SLP: regular/thins, EMST, mild dysarthria. Decreased attention. Patient refused MBS.  - Will need family training  - EDOD:  5/1/21 to home

## 2021-04-27 NOTE — PROGRESS NOTE ADULT - SUBJECTIVE AND OBJECTIVE BOX
Patient is a 65y old  Female who presents with a chief complaint of L lateral medullary CVA  1.4 (22 Apr 2021 07:46)      HPI:  65F hx of HTN, HLD, T2DM off meds since 2018 and has not followed up with PMD since then presenting with R sided numbness. Pt related she has not felt well which started about 4 days ago and felt her balance was off and occasional dizziness. Today after waking up, she took a shower at 11AM and at the time noticed exquisite painful sensitivity in the R lower extremity and throughout the day noted numbness in the RLE and RUE and R side of face and even on the R torso. She went to get a pedicure at around 2 PM and noticed again exquisite painful sensitivity in the R foot when placed in water. She did not have slurring of speech, visual issues, dysphagia, or focal weakness. She went to see PMD and advised ED visit to r/o CVA. In ED, out of window for tPA. Afebrile BP: 187/104 P: 74 sat 100% on RA. CT head and CT angio neg for acute infarct but with focal stenosis in bl M2 MCA and bl PCA. Blood glucose was 511. S/p IV Labetalol 20mg in ED and 6U regular insulin. Pt feels well otherwise - wanted to sign out AMA because she didn't want to be moved upstairs where her mother had passed.     CTH unremarkable. 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 CTA revealed multiple B/L severe focal stenoses prox M2 branch and both MCAs B/L in PCA. As per neuro, continue high dose statin, ASA and Plavix for three weeks (started 4/14/21) then just ASA monotherapy. Cardio rec LON, done 4/21/21 which showed no cardiac source of embolism.    Patient was medically stable for discharge to [place] for acute rehab 4/21/21.   (21 Apr 2021 15:46)      PAST MEDICAL & SURGICAL HISTORY:  HTN (hypertension)    HLD (hyperlipidemia)    Diabetes mellitus    History of ankle surgery        Allergies    No Known Allergies    Intolerances      SUBJECTIVE: Slept well overnight. Headache is much improved today. Her right hand feels less sensitive today.       Vital Signs Last 24 Hrs  T(C): 36.8 (27 Apr 2021 05:08), Max: 36.8 (27 Apr 2021 05:08)  T(F): 98.2 (27 Apr 2021 05:08), Max: 98.2 (27 Apr 2021 05:08)  HR: 88 (27 Apr 2021 08:22) (75 - 88)  BP: 128/77 (27 Apr 2021 08:22) (128/77 - 146/86)  BP(mean): --  RR: 16 (27 Apr 2021 08:22) (16 - 16)  SpO2: 97% (27 Apr 2021 08:22) (97% - 100%)      Gen - NAD, Comfortable  HEENT - NCAT, EOMI, MMM, no saccades  Neck - Supple, No limited ROM  Pulm - CTAB, No wheeze, No rhonchi, No crackles  Cardiovascular - RRR, S1S2, No m/r/g  Abdomen - Soft, NT/ND, +BS  Extremities - No C/C/E, No calf tenderness  Neuro-     Cognitive - AAOx3     Communication - Fluent, mild dysarthria     Attention: Intact         Memory: Recall 3 objects immediate and 3 min later with cueing x1        Cranial Nerves - PERRLA, EOMI, mild left facial droop, sensory impairment, dysarthria,     vestibulo-ocular reflex-- +dizziness with head rotation in horizontal or vertical motion when focusing on stationary object     +mild impairment with saccadic eye motion       Motor -                    LEFT    UE - ShAB 5/5, EF 5/5, EE 5/5, WE 5/5,  5/5                    RIGHT UE - ShAB 5/5, EF 5/5, EE 5/5, WE 5/5,  5/5                    LEFT    LE - HF 5/5, KE 5/5, DF 5/5, PF 5/5                    RIGHT LE - HF 5/5, KE 5/5, DF 5/5, PF 5/5        Sensory - increased sensitivity to right upper and lower  extremity and right low back     Coordination - FTN intact     Tone - normal    MSK: Cervical ROM WNL, No cervical spinal or paraspinal tenderness  Psychiatric - Mood stable, Affect WNL  Skin: Intact      RECENT LABS:                                     12.9   5.44  )-----------( 252      ( 26 Apr 2021 06:58 )             40.4     04-26    140  |  105  |  15  ----------------------------<  128<H>  3.8   |  27  |  0.59    Ca    9.8      26 Apr 2021 06:58    TPro  7.7  /  Alb  3.1<L>  /  TBili  0.3  /  DBili  x   /  AST  70<H>  /  ALT  78<H>  /  AlkPhos  86  04-26      CAPILLARY BLOOD GLUCOSE      POCT Blood Glucose.: 111 mg/dL (26 Apr 2021 08:09)  POCT Blood Glucose.: 128 mg/dL (25 Apr 2021 21:05)  POCT Blood Glucose.: 133 mg/dL (25 Apr 2021 17:02)  POCT Blood Glucose.: 181 mg/dL (25 Apr 2021 11:51)          MEDICATIONS  (STANDING):  amLODIPine   Tablet 10 milliGRAM(s) Oral daily  AQUAPHOR (petrolatum Ointment) 1 Application(s) Topical two times a day  aspirin enteric coated 81 milliGRAM(s) Oral daily  atorvastatin 80 milliGRAM(s) Oral at bedtime  clopidogrel Tablet 75 milliGRAM(s) Oral daily  dextrose 40% Gel 15 Gram(s) Oral once  dextrose 5%. 1000 milliLiter(s) (50 mL/Hr) IV Continuous <Continuous>  dextrose 5%. 1000 milliLiter(s) (100 mL/Hr) IV Continuous <Continuous>  dextrose 50% Injectable 25 Gram(s) IV Push once  dextrose 50% Injectable 12.5 Gram(s) IV Push once  dextrose 50% Injectable 25 Gram(s) IV Push once  enoxaparin Injectable 40 milliGRAM(s) SubCutaneous daily  gabapentin 600 milliGRAM(s) Oral at bedtime  gabapentin 300 milliGRAM(s) Oral <User Schedule>  gabapentin 600 milliGRAM(s) Oral <User Schedule>  glucagon  Injectable 1 milliGRAM(s) IntraMuscular once  insulin glargine Injectable (LANTUS) 35 Unit(s) SubCutaneous at bedtime  insulin lispro (ADMELOG) corrective regimen sliding scale   SubCutaneous three times a day before meals  insulin lispro (ADMELOG) corrective regimen sliding scale   SubCutaneous at bedtime  insulin lispro Injectable (ADMELOG) 10 Unit(s) SubCutaneous three times a day before meals  lidocaine   Patch 1 Patch Transdermal every 24 hours  lisinopril 20 milliGRAM(s) Oral two times a day  magnesium oxide 400 milliGRAM(s) Oral with breakfast  metFORMIN 500 milliGRAM(s) Oral <User Schedule>  omega-3-Acid Ethyl Esters 2 Gram(s) Oral two times a day  pantoprazole    Tablet 40 milliGRAM(s) Oral before breakfast    MEDICATIONS  (PRN):  acetaminophen   Tablet .. 650 milliGRAM(s) Oral every 6 hours PRN Temp greater or equal to 38C (100.4F), Mild Pain (1 - 3)  ondansetron    Tablet 4 milliGRAM(s) Oral every 6 hours PRN Nausea and/or Vomiting  senna 2 Tablet(s) Oral at bedtime PRN Constipation  traMADol 25 milliGRAM(s) Oral every 6 hours PRN Severe Pain (7 - 10)                 Patient is a 65y old  Female who presents with a chief complaint of L lateral medullary CVA  1.4 (22 Apr 2021 07:46)      HPI:  65F hx of HTN, HLD, T2DM off meds since 2018 and has not followed up with PMD since then presenting with R sided numbness. Pt related she has not felt well which started about 4 days ago and felt her balance was off and occasional dizziness. Today after waking up, she took a shower at 11AM and at the time noticed exquisite painful sensitivity in the R lower extremity and throughout the day noted numbness in the RLE and RUE and R side of face and even on the R torso. She went to get a pedicure at around 2 PM and noticed again exquisite painful sensitivity in the R foot when placed in water. She did not have slurring of speech, visual issues, dysphagia, or focal weakness. She went to see PMD and advised ED visit to r/o CVA. In ED, out of window for tPA. Afebrile BP: 187/104 P: 74 sat 100% on RA. CT head and CT angio neg for acute infarct but with focal stenosis in bl M2 MCA and bl PCA. Blood glucose was 511. S/p IV Labetalol 20mg in ED and 6U regular insulin. Pt feels well otherwise - wanted to sign out AMA because she didn't want to be moved upstairs where her mother had passed.     CTH unremarkable. 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 CTA revealed multiple B/L severe focal stenoses prox M2 branch and both MCAs B/L in PCA. As per neuro, continue high dose statin, ASA and Plavix for three weeks (started 4/14/21) then just ASA monotherapy. Cardio rec LON, done 4/21/21 which showed no cardiac source of embolism.    Patient was medically stable for discharge to [place] for acute rehab 4/21/21.   (21 Apr 2021 15:46)      PAST MEDICAL & SURGICAL HISTORY:  HTN (hypertension)    HLD (hyperlipidemia)    Diabetes mellitus    History of ankle surgery        Allergies    No Known Allergies    Intolerances      SUBJECTIVE: Slept well overnight. Headache is much improved today. Her right hand feels less sensitive today. Still has bothersome neuropathic pain in right LE.        Vital Signs Last 24 Hrs  T(C): 36.8 (27 Apr 2021 05:08), Max: 36.8 (27 Apr 2021 05:08)  T(F): 98.2 (27 Apr 2021 05:08), Max: 98.2 (27 Apr 2021 05:08)  HR: 88 (27 Apr 2021 08:22) (75 - 88)  BP: 128/77 (27 Apr 2021 08:22) (128/77 - 146/86)  BP(mean): --  RR: 16 (27 Apr 2021 08:22) (16 - 16)  SpO2: 97% (27 Apr 2021 08:22) (97% - 100%)      Gen - NAD, Comfortable  HEENT - NCAT, EOMI, MMM, no saccades  Neck - Supple, No limited ROM  Pulm - CTAB, No wheeze, No rhonchi, No crackles  Cardiovascular - RRR, S1S2, No m/r/g  Abdomen - Soft, NT/ND, +BS  Extremities - No C/C/E, No calf tenderness  Neuro-     Cognitive - AAOx3     Communication - Fluent, mild dysarthria     Attention: Intact         Memory: Recall 3 objects immediate and 3 min later with cueing x1        Cranial Nerves - PERRLA, EOMI, mild left facial droop, sensory impairment, dysarthria,     vestibulo-ocular reflex-- +dizziness with head rotation in horizontal or vertical motion when focusing on stationary object     +mild impairment with saccadic eye motion       Motor -                    LEFT    UE - ShAB 5/5, EF 5/5, EE 5/5, WE 5/5,  5/5                    RIGHT UE - ShAB 5/5, EF 5/5, EE 5/5, WE 5/5,  5/5                    LEFT    LE - HF 5/5, KE 5/5, DF 5/5, PF 5/5                    RIGHT LE - HF 5/5, KE 5/5, DF 5/5, PF 5/5        Sensory - increased sensitivity to right upper and lower  extremity and right low back     Coordination - FTN intact     Tone - normal    MSK: Cervical ROM WNL, No cervical spinal or paraspinal tenderness  Psychiatric - Mood stable, Affect WNL  Skin: Intact      RECENT LABS:                                     12.9   5.44  )-----------( 252      ( 26 Apr 2021 06:58 )             40.4     04-26    140  |  105  |  15  ----------------------------<  128<H>  3.8   |  27  |  0.59    Ca    9.8      26 Apr 2021 06:58    TPro  7.7  /  Alb  3.1<L>  /  TBili  0.3  /  DBili  x   /  AST  70<H>  /  ALT  78<H>  /  AlkPhos  86  04-26      CAPILLARY BLOOD GLUCOSE      POCT Blood Glucose.: 111 mg/dL (26 Apr 2021 08:09)  POCT Blood Glucose.: 128 mg/dL (25 Apr 2021 21:05)  POCT Blood Glucose.: 133 mg/dL (25 Apr 2021 17:02)  POCT Blood Glucose.: 181 mg/dL (25 Apr 2021 11:51)          MEDICATIONS  (STANDING):  amLODIPine   Tablet 10 milliGRAM(s) Oral daily  AQUAPHOR (petrolatum Ointment) 1 Application(s) Topical two times a day  aspirin enteric coated 81 milliGRAM(s) Oral daily  atorvastatin 80 milliGRAM(s) Oral at bedtime  clopidogrel Tablet 75 milliGRAM(s) Oral daily  dextrose 40% Gel 15 Gram(s) Oral once  dextrose 5%. 1000 milliLiter(s) (50 mL/Hr) IV Continuous <Continuous>  dextrose 5%. 1000 milliLiter(s) (100 mL/Hr) IV Continuous <Continuous>  dextrose 50% Injectable 25 Gram(s) IV Push once  dextrose 50% Injectable 12.5 Gram(s) IV Push once  dextrose 50% Injectable 25 Gram(s) IV Push once  enoxaparin Injectable 40 milliGRAM(s) SubCutaneous daily  gabapentin 600 milliGRAM(s) Oral at bedtime  gabapentin 300 milliGRAM(s) Oral <User Schedule>  gabapentin 600 milliGRAM(s) Oral <User Schedule>  glucagon  Injectable 1 milliGRAM(s) IntraMuscular once  insulin glargine Injectable (LANTUS) 35 Unit(s) SubCutaneous at bedtime  insulin lispro (ADMELOG) corrective regimen sliding scale   SubCutaneous three times a day before meals  insulin lispro (ADMELOG) corrective regimen sliding scale   SubCutaneous at bedtime  insulin lispro Injectable (ADMELOG) 10 Unit(s) SubCutaneous three times a day before meals  lidocaine   Patch 1 Patch Transdermal every 24 hours  lisinopril 20 milliGRAM(s) Oral two times a day  magnesium oxide 400 milliGRAM(s) Oral with breakfast  metFORMIN 500 milliGRAM(s) Oral <User Schedule>  omega-3-Acid Ethyl Esters 2 Gram(s) Oral two times a day  pantoprazole    Tablet 40 milliGRAM(s) Oral before breakfast    MEDICATIONS  (PRN):  acetaminophen   Tablet .. 650 milliGRAM(s) Oral every 6 hours PRN Temp greater or equal to 38C (100.4F), Mild Pain (1 - 3)  ondansetron    Tablet 4 milliGRAM(s) Oral every 6 hours PRN Nausea and/or Vomiting  senna 2 Tablet(s) Oral at bedtime PRN Constipation  traMADol 25 milliGRAM(s) Oral every 6 hours PRN Severe Pain (7 - 10)

## 2021-04-27 NOTE — PROGRESS NOTE ADULT - ASSESSMENT
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    #L Lateral Medullary CVA  -comprehensive rehab program -PT/OT/SLP per rehab team  -Pain management, bowel regimen per rehab   -ASA and Plavix followed by ASA monotherapy, last dose of Plavix will be 5/5/21  -Lipitor 80mg QHS, lovaza  -monitor LFTs  -LON 4/21/21: no cardiac source of embolism  -F/u neuro outpatient, Dr. Owen    #HTN, uncontrolled  - lisinopril 20mg BID  - norvasc increased 5mg to 10mg 4/25/21, received first increased dosage today. Would recommend watching for a day as can take time to reach steady state. Will reassess tomorrow.   - can give PRN hydralazine for breakthrough inc systolic >170.     # Headaches  doubt related to hypertension with systolic 150s-160s.  - repeat CT head wnl    #T2DM, uncontrolled - HbA1c 13.9  -patient stable on current insulin regimen with appropriate POC glucose. Will decrease admelog with starting metformin.  -Lantus 35un qhs + Admelog 10U TID AC.  -start metformin5 500 mg w/ dinner. Slow titration up due to GI intolerability in the past.   -FS and ISS  -Gabapentin per rehab    # Bilateral lower extremity hypopigmented macules   appears benign, likely related to sun exposure. Not infectious and pt is asymptomatic. Seems consistent with idiopathic guttate hypomelanosis.   - monitor.    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    #L Lateral Medullary CVA  -comprehensive rehab program -PT/OT/SLP per rehab team  -Pain management, bowel regimen per rehab   -ASA and Plavix followed by ASA monotherapy, last dose of Plavix will be 5/5/21  -Lipitor 80mg QHS, lovaza  -monitor LFTs  -LON 4/21/21: no cardiac source of embolism  -F/u neuro outpatient, Dr. Owen    #HTN  - improving  - lisinopril 20mg BID  - norvasc increased 5mg to 10mg 4/25/21. Will take time to reach steady state. Will monitor.   - can give PRN hydralazine for breakthrough inc systolic >170.     # Headaches  doubt related to hypertension with systolic 150s-160s.  - repeat CT head wnl    #T2DM, uncontrolled - HbA1c 13.9  -patient stable on current insulin regimen with appropriate POC glucose. Will decrease admelog with starting metformin.  -Lantus 35un qhs + Admelog 10U TID AC.  - metformin 500 mg w/ dinner. Slow titration up due to GI intolerability in the past.   -FS and ISS  -Gabapentin per rehab    # Bilateral lower extremity hypopigmented macules   appears benign, likely related to sun exposure. Not infectious and pt is asymptomatic. consistent with idiopathic guttate hypomelanosis.   - minimize sun exposure    DVT ppx - Lovenox, SCDs

## 2021-04-27 NOTE — PROGRESS NOTE ADULT - SUBJECTIVE AND OBJECTIVE BOX
Follow up for stroke  SUBJ:    improving but still with some neurological complaints    PMH  HTN (hypertension)    HLD (hyperlipidemia)    Diabetes mellitus        MEDICATIONS  (STANDING):  amLODIPine   Tablet 10 milliGRAM(s) Oral daily  AQUAPHOR (petrolatum Ointment) 1 Application(s) Topical two times a day  aspirin enteric coated 81 milliGRAM(s) Oral daily  atorvastatin 80 milliGRAM(s) Oral at bedtime  clopidogrel Tablet 75 milliGRAM(s) Oral daily  dextrose 40% Gel 15 Gram(s) Oral once  dextrose 5%. 1000 milliLiter(s) (50 mL/Hr) IV Continuous <Continuous>  dextrose 5%. 1000 milliLiter(s) (100 mL/Hr) IV Continuous <Continuous>  dextrose 50% Injectable 25 Gram(s) IV Push once  dextrose 50% Injectable 12.5 Gram(s) IV Push once  dextrose 50% Injectable 25 Gram(s) IV Push once  enoxaparin Injectable 40 milliGRAM(s) SubCutaneous daily  gabapentin 600 milliGRAM(s) Oral at bedtime  gabapentin 300 milliGRAM(s) Oral <User Schedule>  gabapentin 600 milliGRAM(s) Oral <User Schedule>  glucagon  Injectable 1 milliGRAM(s) IntraMuscular once  insulin glargine Injectable (LANTUS) 35 Unit(s) SubCutaneous at bedtime  insulin lispro (ADMELOG) corrective regimen sliding scale   SubCutaneous three times a day before meals  insulin lispro (ADMELOG) corrective regimen sliding scale   SubCutaneous at bedtime  insulin lispro Injectable (ADMELOG) 10 Unit(s) SubCutaneous three times a day before meals  lidocaine   Patch 1 Patch Transdermal every 24 hours  lisinopril 20 milliGRAM(s) Oral two times a day  magnesium oxide 400 milliGRAM(s) Oral with breakfast  metFORMIN 500 milliGRAM(s) Oral <User Schedule>  omega-3-Acid Ethyl Esters 2 Gram(s) Oral two times a day  pantoprazole    Tablet 40 milliGRAM(s) Oral before breakfast    MEDICATIONS  (PRN):  acetaminophen   Tablet .. 650 milliGRAM(s) Oral every 6 hours PRN Temp greater or equal to 38C (100.4F), Mild Pain (1 - 3)  ondansetron    Tablet 4 milliGRAM(s) Oral every 6 hours PRN Nausea and/or Vomiting  senna 2 Tablet(s) Oral at bedtime PRN Constipation  traMADol 25 milliGRAM(s) Oral every 6 hours PRN Severe Pain (7 - 10)        PHYSICAL EXAM:  Vital Signs Last 24 Hrs  T(C): 36.8 (27 Apr 2021 05:08), Max: 36.8 (27 Apr 2021 05:08)  T(F): 98.2 (27 Apr 2021 05:08), Max: 98.2 (27 Apr 2021 05:08)  HR: 92 (27 Apr 2021 17:11) (77 - 92)  BP: 172/72 (27 Apr 2021 17:11) (128/77 - 172/72)  BP(mean): --  RR: 16 (27 Apr 2021 08:22) (16 - 16)  SpO2: 97% (27 Apr 2021 08:22) (97% - 100%)    GENERAL: NAD, well-groomed, well-developed  HEAD:  Atraumatic, Normocephalic  EYES: EOMI, PERRLA, conjunctiva and sclera clear  ENT: Moist mucous membranes,  NECK: Supple, No JVD, no bruits  CHEST/LUNG: Clear to percussion bilaterally; No rales, rhonchi, wheezing, or rubs  HEART: Regular rate and rhythm; No murmurs, rubs, or gallops PMI non displaced.  ABDOMEN: Soft, Nontender, Nondistended; Bowel sounds present  EXTREMITIES:  2+ Peripheral Pulses, No clubbing, cyanosis, or edema  SKIN: No rashes or lesions  NERVOUS SYSTEM:  Cranial Nerves II-XII intact      TELEMETRY:    ECG:  ECHO:    < from: LON Echo Doppler w/ Cont (04.21.21 @ 12:01) >   1. Left ventricular ejection fraction, by visual estimation, is 60 to 65%.   2. Normal global left ventricular systolic function.   3. Normal left ventricular internal cavity size.   4. Normal left ventricular size and wall thicknesses, with normal systolic and diastolic function.   5. Normal right ventricular size and function.   6. The left atrium is normal in size.   7. The right atrium is normal in size.   8. Trace mitral valve regurgitation.   9. Color flow doppler and intravenous injection of agitated saline demonstrates the presence of an intact intra atrial septum.  10. No left atrial appendage thrombus.    [id number] Camilo Strickland MD, [facility] , Electronically signed on 4/21/2021 at 3:07:51 PM        *** Final ***                CAMILO STRICKLAND M.D., ATTENDING CARDIOLOGIST  This document has been electronically signed. Apr 21 2021 12:01PM    < end of copied text >      LABS:                        12.9   5.44  )-----------( 252      ( 26 Apr 2021 06:58 )             40.4     04-27    140  |  105  |  16  ----------------------------<  156<H>  4.2   |  27  |  0.71    Ca    9.3      27 Apr 2021 06:14  Mg     1.7     04-27    TPro  7.7  /  Alb  3.1<L>  /  TBili  0.3  /  DBili  x   /  AST  70<H>  /  ALT  78<H>  /  AlkPhos  86  04-26            I&O's Summary    BNP    RADIOLOGY & ADDITIONAL STUDIES:    ECHO:    Impression  CVA with thrombo embolism  I gave Corazon information about a long-term event recording versus an internal loop recorder. we will start off with a 30 day monitor and consider advancing towards a loop recorder as indicated in order to exclude atrial fibrillation as a cause for stroke and indications for anticoagulation regulation such as apixaban  or warfarin

## 2021-04-27 NOTE — PROGRESS NOTE ADULT - SUBJECTIVE AND OBJECTIVE BOX
Patient is a 65y old  Female who presents with a chief complaint of L lateral medullary CVA  1.4 (26 Apr 2021 13:16)      24 HOUR EVENTS:  No overnight events reported.     SUBJECTIVE:  Patient seen and examined at bedside.     ALLERGIES:  No Known Allergies    MEDICATIONS  (STANDING):  amLODIPine   Tablet 10 milliGRAM(s) Oral daily  AQUAPHOR (petrolatum Ointment) 1 Application(s) Topical two times a day  aspirin enteric coated 81 milliGRAM(s) Oral daily  atorvastatin 80 milliGRAM(s) Oral at bedtime  clopidogrel Tablet 75 milliGRAM(s) Oral daily  dextrose 40% Gel 15 Gram(s) Oral once  dextrose 5%. 1000 milliLiter(s) (50 mL/Hr) IV Continuous <Continuous>  dextrose 5%. 1000 milliLiter(s) (100 mL/Hr) IV Continuous <Continuous>  dextrose 50% Injectable 25 Gram(s) IV Push once  dextrose 50% Injectable 12.5 Gram(s) IV Push once  dextrose 50% Injectable 25 Gram(s) IV Push once  enoxaparin Injectable 40 milliGRAM(s) SubCutaneous daily  gabapentin 600 milliGRAM(s) Oral at bedtime  gabapentin 300 milliGRAM(s) Oral <User Schedule>  gabapentin 600 milliGRAM(s) Oral <User Schedule>  glucagon  Injectable 1 milliGRAM(s) IntraMuscular once  insulin glargine Injectable (LANTUS) 35 Unit(s) SubCutaneous at bedtime  insulin lispro (ADMELOG) corrective regimen sliding scale   SubCutaneous three times a day before meals  insulin lispro (ADMELOG) corrective regimen sliding scale   SubCutaneous at bedtime  insulin lispro Injectable (ADMELOG) 10 Unit(s) SubCutaneous three times a day before meals  lidocaine   Patch 1 Patch Transdermal every 24 hours  lisinopril 20 milliGRAM(s) Oral two times a day  magnesium oxide 400 milliGRAM(s) Oral with breakfast  metFORMIN 500 milliGRAM(s) Oral <User Schedule>  omega-3-Acid Ethyl Esters 2 Gram(s) Oral two times a day  pantoprazole    Tablet 40 milliGRAM(s) Oral before breakfast    MEDICATIONS  (PRN):  acetaminophen   Tablet .. 650 milliGRAM(s) Oral every 6 hours PRN Temp greater or equal to 38C (100.4F), Mild Pain (1 - 3)  ondansetron    Tablet 4 milliGRAM(s) Oral every 6 hours PRN Nausea and/or Vomiting  senna 2 Tablet(s) Oral at bedtime PRN Constipation  traMADol 25 milliGRAM(s) Oral every 6 hours PRN Severe Pain (7 - 10)    Vital Signs Last 24 Hrs  T(F): 98.2 (27 Apr 2021 05:08), Max: 98.2 (27 Apr 2021 05:08)  HR: 88 (27 Apr 2021 08:22) (75 - 88)  BP: 128/77 (27 Apr 2021 08:22) (128/77 - 146/86)  RR: 16 (27 Apr 2021 08:22) (16 - 16)  SpO2: 97% (27 Apr 2021 08:22) (97% - 100%)  I&O's Summary    PHYSICAL EXAM:  General: NAD, A/O x 3  ENT: Moist mucous membranes, no thrush  Neck: Supple, No JVD  Lungs: Clear to auscultation bilaterally, good air entry, non-labored breathing  Cardio: RRR, S1/S2, No murmur  Abdomen: Soft, Nontender, Nondistended; Bowel sounds present  Extremities: No calf tenderness, No pitting edema    LABS:                        12.9   5.44  )-----------( 252      ( 26 Apr 2021 06:58 )             40.4     04-27    140  |  105  |  16  ----------------------------<  156  4.2   |  27  |  0.71    Ca    9.3      27 Apr 2021 06:14  Mg     1.7     04-27    TPro  7.7  /  Alb  3.1  /  TBili  0.3  /  DBili  x   /  AST  70  /  ALT  78  /  AlkPhos  86  04-26        eGFR if Non African American: 90 mL/min/1.73M2 (04-27-21 @ 06:14)  eGFR if African American: 105 mL/min/1.73M2 (04-27-21 @ 06:14)        04-14 Chol 206 mg/dL LDL -- HDL 37 mg/dL Trig 195 mg/dL          POCT Blood Glucose.: 131 mg/dL (27 Apr 2021 07:57)  POCT Blood Glucose.: 96 mg/dL (26 Apr 2021 21:51)  POCT Blood Glucose.: 128 mg/dL (26 Apr 2021 16:42)  POCT Blood Glucose.: 92 mg/dL (26 Apr 2021 11:20)          RADIOLOGY & ADDITIONAL TESTS:    Care Discussed with Consultants/Other Providers:    Patient is a 65y old  Female who presents with a chief complaint of L lateral medullary CVA  1.4 (26 Apr 2021 13:16)      24 HOUR EVENTS:  No overnight events reported.     SUBJECTIVE:  Patient seen and examined at bedside.   Patient says that headache is improved.     ALLERGIES:  No Known Allergies    MEDICATIONS  (STANDING):  amLODIPine   Tablet 10 milliGRAM(s) Oral daily  AQUAPHOR (petrolatum Ointment) 1 Application(s) Topical two times a day  aspirin enteric coated 81 milliGRAM(s) Oral daily  atorvastatin 80 milliGRAM(s) Oral at bedtime  clopidogrel Tablet 75 milliGRAM(s) Oral daily  dextrose 40% Gel 15 Gram(s) Oral once  dextrose 5%. 1000 milliLiter(s) (50 mL/Hr) IV Continuous <Continuous>  dextrose 5%. 1000 milliLiter(s) (100 mL/Hr) IV Continuous <Continuous>  dextrose 50% Injectable 25 Gram(s) IV Push once  dextrose 50% Injectable 12.5 Gram(s) IV Push once  dextrose 50% Injectable 25 Gram(s) IV Push once  enoxaparin Injectable 40 milliGRAM(s) SubCutaneous daily  gabapentin 600 milliGRAM(s) Oral at bedtime  gabapentin 300 milliGRAM(s) Oral <User Schedule>  gabapentin 600 milliGRAM(s) Oral <User Schedule>  glucagon  Injectable 1 milliGRAM(s) IntraMuscular once  insulin glargine Injectable (LANTUS) 35 Unit(s) SubCutaneous at bedtime  insulin lispro (ADMELOG) corrective regimen sliding scale   SubCutaneous three times a day before meals  insulin lispro (ADMELOG) corrective regimen sliding scale   SubCutaneous at bedtime  insulin lispro Injectable (ADMELOG) 10 Unit(s) SubCutaneous three times a day before meals  lidocaine   Patch 1 Patch Transdermal every 24 hours  lisinopril 20 milliGRAM(s) Oral two times a day  magnesium oxide 400 milliGRAM(s) Oral with breakfast  metFORMIN 500 milliGRAM(s) Oral <User Schedule>  omega-3-Acid Ethyl Esters 2 Gram(s) Oral two times a day  pantoprazole    Tablet 40 milliGRAM(s) Oral before breakfast    MEDICATIONS  (PRN):  acetaminophen   Tablet .. 650 milliGRAM(s) Oral every 6 hours PRN Temp greater or equal to 38C (100.4F), Mild Pain (1 - 3)  ondansetron    Tablet 4 milliGRAM(s) Oral every 6 hours PRN Nausea and/or Vomiting  senna 2 Tablet(s) Oral at bedtime PRN Constipation  traMADol 25 milliGRAM(s) Oral every 6 hours PRN Severe Pain (7 - 10)    Vital Signs Last 24 Hrs  T(F): 98.2 (27 Apr 2021 05:08), Max: 98.2 (27 Apr 2021 05:08)  HR: 88 (27 Apr 2021 08:22) (75 - 88)  BP: 128/77 (27 Apr 2021 08:22) (128/77 - 146/86)  RR: 16 (27 Apr 2021 08:22) (16 - 16)  SpO2: 97% (27 Apr 2021 08:22) (97% - 100%)  I&O's Summary    PHYSICAL EXAM:  General: NAD, alert and oriented.   ENT: Moist mucous membranes, no thrush  Neck: Supple, No JVD  Lungs: Clear to auscultation bilaterally, good air entry, non-labored breathing  Cardio: RRR, S1/S2, No murmur  Abdomen: Soft, Nontender, Nondistended; Bowel sounds present  Extremities: No calf tenderness, No pitting edema    LABS:                        12.9   5.44  )-----------( 252      ( 26 Apr 2021 06:58 )             40.4     04-27    140  |  105  |  16  ----------------------------<  156  4.2   |  27  |  0.71    Ca    9.3      27 Apr 2021 06:14  Mg     1.7     04-27    TPro  7.7  /  Alb  3.1  /  TBili  0.3  /  DBili  x   /  AST  70  /  ALT  78  /  AlkPhos  86  04-26        eGFR if Non African American: 90 mL/min/1.73M2 (04-27-21 @ 06:14)  eGFR if African American: 105 mL/min/1.73M2 (04-27-21 @ 06:14)        04-14 Chol 206 mg/dL LDL -- HDL 37 mg/dL Trig 195 mg/dL      POCT Blood Glucose.: 131 mg/dL (27 Apr 2021 07:57)  POCT Blood Glucose.: 96 mg/dL (26 Apr 2021 21:51)  POCT Blood Glucose.: 128 mg/dL (26 Apr 2021 16:42)  POCT Blood Glucose.: 92 mg/dL (26 Apr 2021 11:20)    RADIOLOGY & ADDITIONAL TESTS:    Care Discussed with Consultants/Other Providers:   Rehab, Dr. Vargas.

## 2021-04-28 LAB
GLUCOSE BLDC GLUCOMTR-MCNC: 107 MG/DL — HIGH (ref 70–99)
GLUCOSE BLDC GLUCOMTR-MCNC: 115 MG/DL — HIGH (ref 70–99)
GLUCOSE BLDC GLUCOMTR-MCNC: 126 MG/DL — HIGH (ref 70–99)
GLUCOSE BLDC GLUCOMTR-MCNC: 131 MG/DL — HIGH (ref 70–99)
SARS-COV-2 RNA SPEC QL NAA+PROBE: SIGNIFICANT CHANGE UP

## 2021-04-28 PROCEDURE — 99232 SBSQ HOSP IP/OBS MODERATE 35: CPT

## 2021-04-28 RX ORDER — HYDROCHLOROTHIAZIDE 25 MG
12.5 TABLET ORAL DAILY
Refills: 0 | Status: DISCONTINUED | OUTPATIENT
Start: 2021-04-28 | End: 2021-05-01

## 2021-04-28 RX ORDER — INSULIN GLARGINE 100 [IU]/ML
30 INJECTION, SOLUTION SUBCUTANEOUS AT BEDTIME
Refills: 0 | Status: DISCONTINUED | OUTPATIENT
Start: 2021-04-28 | End: 2021-04-29

## 2021-04-28 RX ADMIN — Medication 10 UNIT(S): at 08:29

## 2021-04-28 RX ADMIN — Medication 650 MILLIGRAM(S): at 07:25

## 2021-04-28 RX ADMIN — Medication 10 UNIT(S): at 17:07

## 2021-04-28 RX ADMIN — Medication 2 GRAM(S): at 17:07

## 2021-04-28 RX ADMIN — METFORMIN HYDROCHLORIDE 500 MILLIGRAM(S): 850 TABLET ORAL at 17:07

## 2021-04-28 RX ADMIN — INSULIN GLARGINE 30 UNIT(S): 100 INJECTION, SOLUTION SUBCUTANEOUS at 22:00

## 2021-04-28 RX ADMIN — Medication 2 GRAM(S): at 06:08

## 2021-04-28 RX ADMIN — Medication 81 MILLIGRAM(S): at 12:13

## 2021-04-28 RX ADMIN — AMLODIPINE BESYLATE 10 MILLIGRAM(S): 2.5 TABLET ORAL at 06:09

## 2021-04-28 RX ADMIN — LIDOCAINE 1 PATCH: 4 CREAM TOPICAL at 19:46

## 2021-04-28 RX ADMIN — PANTOPRAZOLE SODIUM 40 MILLIGRAM(S): 20 TABLET, DELAYED RELEASE ORAL at 06:09

## 2021-04-28 RX ADMIN — LIDOCAINE 1 PATCH: 4 CREAM TOPICAL at 00:00

## 2021-04-28 RX ADMIN — GABAPENTIN 300 MILLIGRAM(S): 400 CAPSULE ORAL at 06:08

## 2021-04-28 RX ADMIN — ATORVASTATIN CALCIUM 80 MILLIGRAM(S): 80 TABLET, FILM COATED ORAL at 22:00

## 2021-04-28 RX ADMIN — Medication 650 MILLIGRAM(S): at 22:40

## 2021-04-28 RX ADMIN — Medication 1 APPLICATION(S): at 06:11

## 2021-04-28 RX ADMIN — CLOPIDOGREL BISULFATE 75 MILLIGRAM(S): 75 TABLET, FILM COATED ORAL at 12:13

## 2021-04-28 RX ADMIN — LISINOPRIL 20 MILLIGRAM(S): 2.5 TABLET ORAL at 06:09

## 2021-04-28 RX ADMIN — GABAPENTIN 600 MILLIGRAM(S): 400 CAPSULE ORAL at 12:57

## 2021-04-28 RX ADMIN — Medication 12.5 MILLIGRAM(S): at 12:12

## 2021-04-28 RX ADMIN — Medication 10 UNIT(S): at 12:16

## 2021-04-28 RX ADMIN — Medication 1 APPLICATION(S): at 17:02

## 2021-04-28 RX ADMIN — Medication 650 MILLIGRAM(S): at 06:09

## 2021-04-28 RX ADMIN — Medication 650 MILLIGRAM(S): at 23:10

## 2021-04-28 RX ADMIN — ENOXAPARIN SODIUM 40 MILLIGRAM(S): 100 INJECTION SUBCUTANEOUS at 12:13

## 2021-04-28 RX ADMIN — MAGNESIUM OXIDE 400 MG ORAL TABLET 400 MILLIGRAM(S): 241.3 TABLET ORAL at 08:29

## 2021-04-28 RX ADMIN — LISINOPRIL 20 MILLIGRAM(S): 2.5 TABLET ORAL at 17:07

## 2021-04-28 RX ADMIN — GABAPENTIN 600 MILLIGRAM(S): 400 CAPSULE ORAL at 22:00

## 2021-04-28 RX ADMIN — LIDOCAINE 1 PATCH: 4 CREAM TOPICAL at 12:12

## 2021-04-28 NOTE — PROGRESS NOTE ADULT - SUBJECTIVE AND OBJECTIVE BOX
HPI:  65F hx of HTN, HLD, T2DM off meds since 2018 and has not followed up with PMD since then presenting with R sided numbness. Pt related she has not felt well which started about 4 days ago and felt her balance was off and occasional dizziness. Today after waking up, she took a shower at 11AM and at the time noticed exquisite painful sensitivity in the R lower extremity and throughout the day noted numbness in the RLE and RUE and R side of face and even on the R torso. She went to get a pedicure at around 2 PM and noticed again exquisite painful sensitivity in the R foot when placed in water. She did not have slurring of speech, visual issues, dysphagia, or focal weakness. She went to see PMD and advised ED visit to r/o CVA. In ED, out of window for tPA. Afebrile BP: 187/104 P: 74 sat 100% on RA. CT head and CT angio neg for acute infarct but with focal stenosis in bl M2 MCA and bl PCA. Blood glucose was 511. S/p IV Labetalol 20mg in ED and 6U regular insulin. Pt feels well otherwise - wanted to sign out AMA because she didn't want to be moved upstairs where her mother had passed.     CTH unremarkable. 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 CTA revealed multiple B/L severe focal stenoses prox M2 branch and both MCAs B/L in PCA. As per neuro, continue high dose statin, ASA and Plavix for three weeks (started 4/14/21) then just ASA monotherapy. Cardio rec LON, done 4/21/21 which showed no cardiac source of embolism.    Patient was medically stable for discharge to [facility] for acute rehab 4/21/21.   (21 Apr 2021 15:46)      PAST MEDICAL & SURGICAL HISTORY:  HTN (hypertension)    HLD (hyperlipidemia)    Diabetes mellitus    History of ankle surgery        Subjective:  Feeling better.  left LE neuropathic pain improved.  no headache.  no nausea, abdominal pain or Diarrhea.        VITALS  Vital Signs Last 24 Hrs  T(C): 36.8 (28 Apr 2021 07:30), Max: 36.8 (28 Apr 2021 07:30)  T(F): 98.3 (28 Apr 2021 07:30), Max: 98.3 (28 Apr 2021 07:30)  HR: 87 (28 Apr 2021 12:11) (78 - 94)  BP: 149/83 (28 Apr 2021 12:11) (145/88 - 172/72)  BP(mean): --  RR: 15 (28 Apr 2021 07:30) (15 - 16)  SpO2: 97% (28 Apr 2021 07:30) (97% - 99%)    REVIEW OF SYMPTOMS  Neuro deficit--   Neuropathic pain    Physical Exam:  Gen - NAD, Comfortable  HEENT - NCAT, EOMI, MMM, no saccades  Neck - Supple, No limited ROM  Pulm - CTAB, No wheeze, No rhonchi, No crackles  Cardiovascular - RRR, S1S2, No m/r/g  Abdomen - Soft, NT/ND, +BS  Extremities - No C/C/E, No calf tenderness  Neuro-     Cognitive - AAOx3     Communication - Fluent, mild dysarthria     Attention: Intact         Memory: Recall --mild deficit     Cranial Nerves - PERRLA, EOMI, mild left facial droop, sensory impairment, dysarthria,     vestibulo-ocular reflex-- +dizziness with head rotation in horizontal or vertical motion when focusing on stationary object     +mild impairment with saccadic eye motion       Motor -                    LEFT    UE - ShAB 5/5, EF 5/5, EE 5/5, WE 5/5,  5/5                    RIGHT UE - ShAB 5/5, EF 5/5, EE 5/5, WE 5/5,  5/5                    LEFT    LE - HF 5/5, KE 5/5, DF 5/5, PF 5/5                    RIGHT LE - HF 5/5, KE 5/5, DF 5/5, PF 5/5        Sensory - increased sensitivity to right upper and lower  extremity and right low back     Coordination - FTN intact     Tone - normal    MSK: Cervical ROM WNL, No cervical spinal or paraspinal tenderness  Psychiatric - Mood stable, Affect WNL    RECENT LABS    04-27    140  |  105  |  16  ----------------------------<  156<H>  4.2   |  27  |  0.71    Ca    9.3      27 Apr 2021 06:14  Mg     1.7     04-27              RADIOLOGY/OTHER RESULTS      MEDICATIONS  (STANDING):  amLODIPine   Tablet 10 milliGRAM(s) Oral daily  AQUAPHOR (petrolatum Ointment) 1 Application(s) Topical two times a day  aspirin enteric coated 81 milliGRAM(s) Oral daily  atorvastatin 80 milliGRAM(s) Oral at bedtime  clopidogrel Tablet 75 milliGRAM(s) Oral daily  dextrose 40% Gel 15 Gram(s) Oral once  dextrose 5%. 1000 milliLiter(s) (50 mL/Hr) IV Continuous <Continuous>  dextrose 5%. 1000 milliLiter(s) (100 mL/Hr) IV Continuous <Continuous>  dextrose 50% Injectable 25 Gram(s) IV Push once  dextrose 50% Injectable 12.5 Gram(s) IV Push once  dextrose 50% Injectable 25 Gram(s) IV Push once  enoxaparin Injectable 40 milliGRAM(s) SubCutaneous daily  gabapentin 600 milliGRAM(s) Oral at bedtime  gabapentin 300 milliGRAM(s) Oral <User Schedule>  gabapentin 600 milliGRAM(s) Oral <User Schedule>  glucagon  Injectable 1 milliGRAM(s) IntraMuscular once  hydrochlorothiazide 12.5 milliGRAM(s) Oral daily  insulin glargine Injectable (LANTUS) 30 Unit(s) SubCutaneous at bedtime  insulin lispro (ADMELOG) corrective regimen sliding scale   SubCutaneous three times a day before meals  insulin lispro (ADMELOG) corrective regimen sliding scale   SubCutaneous at bedtime  insulin lispro Injectable (ADMELOG) 10 Unit(s) SubCutaneous three times a day before meals  lidocaine   Patch 1 Patch Transdermal every 24 hours  lisinopril 20 milliGRAM(s) Oral two times a day  magnesium oxide 400 milliGRAM(s) Oral with breakfast  metFORMIN 500 milliGRAM(s) Oral <User Schedule>  omega-3-Acid Ethyl Esters 2 Gram(s) Oral two times a day  pantoprazole    Tablet 40 milliGRAM(s) Oral before breakfast    MEDICATIONS  (PRN):  acetaminophen   Tablet .. 650 milliGRAM(s) Oral every 6 hours PRN Temp greater or equal to 38C (100.4F), Mild Pain (1 - 3)  ondansetron    Tablet 4 milliGRAM(s) Oral every 6 hours PRN Nausea and/or Vomiting  senna 2 Tablet(s) Oral at bedtime PRN Constipation  traMADol 25 milliGRAM(s) Oral every 6 hours PRN Severe Pain (7 - 10)

## 2021-04-28 NOTE — PROGRESS NOTE ADULT - ASSESSMENT
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    #L Lateral Medullary CVA  -comprehensive rehab program -PT/OT/SLP per rehab team  -Pain management, bowel regimen per rehab   -ASA and Plavix followed by ASA monotherapy, last dose of Plavix will be 5/5/21  -Lipitor 80mg QHS, lovaza  -monitor LFTs  -LON 4/21/21: no cardiac source of embolism  -F/u neuro outpatient, Dr. Owen    #HTN  - improving  - lisinopril 20mg BID  - norvasc increased 5mg to 10mg   - can start low dose hydrochlorothiazide, with repeat chem7 to check electrolytes in a week.     # Headaches  doubt related to hypertension with systolic 150s-160s.  - repeat CT head wnl    #T2DM, uncontrolled - HbA1c 13.9  -patient stable on current insulin regimen with appropriate POC glucose. Will decrease admelog with starting metformin.  -Lantus 35un qhs + Admelog 10U TID AC.  - metformin 500 mg w/ dinner. Slow titration up due to GI intolerability in the past.   -FS and ISS  -Gabapentin per rehab    # Bilateral lower extremity hypopigmented macules   appears benign, likely related to sun exposure. Not infectious and pt is asymptomatic. consistent with idiopathic guttate hypomelanosis.   - minimize sun exposure    DVT ppx - Lovenox, SCDs 64 y/o F with PMHx of HTN, HLD, T2DM off meds since 2018 (has not followed up with PMD) presented to [facility] 4/13/21 with R sided numbness, found to have L lateral medullary CVA with right sensory impairment/parasthesias, dysphagia, dysarthria.    #L Lateral Medullary CVA  -comprehensive rehab program -PT/OT/SLP per rehab team  -Pain management, bowel regimen per rehab   -ASA and Plavix followed by ASA monotherapy, last dose of Plavix will be 5/5/21  -Lipitor 80mg QHS, lovaza  -monitor LFTs  -LON 4/21/21: no cardiac source of embolism  -F/u neuro outpatient, Dr. Owen    #HTN  - improving  - lisinopril 20mg BID  - norvasc increased 5mg to 10mg   - start low dose hydrochlorothiazide (4/28), with repeat chem7 to check electrolytes in a week.     # Headaches  doubt related to hypertension with systolic 150s-160s.  - repeat CT head wnl on 4/26    #T2DM, uncontrolled - HbA1c 13.9  -patient stable on current insulin regimen with appropriate POC glucose. Will decrease admelog with starting metformin.  -Lantus 35un qhs + Admelog 10U TID AC.  - metformin 500 mg w/ dinner. Slow titration up due to GI intolerability in the past.   -FS and ISS  -Gabapentin per rehab    # Bilateral lower extremity hypopigmented macules   appears benign, likely related to sun exposure. Not infectious and pt is asymptomatic. consistent with idiopathic guttate hypomelanosis.   - minimize sun exposure    DVT ppx - Lovenox, SCDs 66 y/o F with PMHx of HTN, HLD, T2DM off meds since 2018 (has not followed up with PMD) presented to [facility] 4/13/21 with R sided numbness, found to have L lateral medullary CVA with right sensory impairment/parasthesias, dysphagia, dysarthria.    #L Lateral Medullary CVA  -comprehensive rehab program -PT/OT/SLP per rehab team  -Pain management, bowel regimen per rehab   -ASA and Plavix followed by ASA monotherapy, last dose of Plavix will be 5/5/21  -Lipitor 80mg QHS, lovaza  -monitor LFTs  -LON 4/21/21: no cardiac source of embolism  -F/u neuro outpatient, Dr. Owen    #HTN  - improving  - lisinopril 20mg BID  - norvasc increased 5mg to 10mg   - start low dose hydrochlorothiazide (4/28), with repeat chem7 to check electrolytes in a week.     # Headaches  doubt related to hypertension with systolic 150s-160s.  - repeat CT head wnl on 4/26    #T2DM, uncontrolled - HbA1c 13.9  -patient stable on current insulin regimen with appropriate POC glucose. Will decrease admelog with starting metformin.  -Lantus dec to 30un qhs + Admelog 10U TID AC.  - metformin 500 mg w/ dinner. Slow titration up due to GI intolerability in the past.   -FS and ISS  -Gabapentin per rehab    # Bilateral lower extremity hypopigmented macules   appears benign, likely related to sun exposure. Not infectious and pt is asymptomatic. consistent with idiopathic guttate hypomelanosis.   - minimize sun exposure    DVT ppx - Lovenox, SCDs

## 2021-04-28 NOTE — PROGRESS NOTE ADULT - SUBJECTIVE AND OBJECTIVE BOX
Patient is a 65y old  Female who presents with a chief complaint of L lateral medullary CVA  1.4 (27 Apr 2021 19:56)      24 HOUR EVENTS:  No overnight events reported.     SUBJECTIVE:  Patient seen and examined at bedside.     ALLERGIES:  No Known Allergies    MEDICATIONS  (STANDING):  amLODIPine   Tablet 10 milliGRAM(s) Oral daily  AQUAPHOR (petrolatum Ointment) 1 Application(s) Topical two times a day  aspirin enteric coated 81 milliGRAM(s) Oral daily  atorvastatin 80 milliGRAM(s) Oral at bedtime  clopidogrel Tablet 75 milliGRAM(s) Oral daily  dextrose 40% Gel 15 Gram(s) Oral once  dextrose 5%. 1000 milliLiter(s) (50 mL/Hr) IV Continuous <Continuous>  dextrose 5%. 1000 milliLiter(s) (100 mL/Hr) IV Continuous <Continuous>  dextrose 50% Injectable 25 Gram(s) IV Push once  dextrose 50% Injectable 12.5 Gram(s) IV Push once  dextrose 50% Injectable 25 Gram(s) IV Push once  enoxaparin Injectable 40 milliGRAM(s) SubCutaneous daily  gabapentin 600 milliGRAM(s) Oral at bedtime  gabapentin 300 milliGRAM(s) Oral <User Schedule>  gabapentin 600 milliGRAM(s) Oral <User Schedule>  glucagon  Injectable 1 milliGRAM(s) IntraMuscular once  insulin glargine Injectable (LANTUS) 35 Unit(s) SubCutaneous at bedtime  insulin lispro (ADMELOG) corrective regimen sliding scale   SubCutaneous three times a day before meals  insulin lispro (ADMELOG) corrective regimen sliding scale   SubCutaneous at bedtime  insulin lispro Injectable (ADMELOG) 10 Unit(s) SubCutaneous three times a day before meals  lidocaine   Patch 1 Patch Transdermal every 24 hours  lisinopril 20 milliGRAM(s) Oral two times a day  magnesium oxide 400 milliGRAM(s) Oral with breakfast  metFORMIN 500 milliGRAM(s) Oral <User Schedule>  omega-3-Acid Ethyl Esters 2 Gram(s) Oral two times a day  pantoprazole    Tablet 40 milliGRAM(s) Oral before breakfast    MEDICATIONS  (PRN):  acetaminophen   Tablet .. 650 milliGRAM(s) Oral every 6 hours PRN Temp greater or equal to 38C (100.4F), Mild Pain (1 - 3)  ondansetron    Tablet 4 milliGRAM(s) Oral every 6 hours PRN Nausea and/or Vomiting  senna 2 Tablet(s) Oral at bedtime PRN Constipation  traMADol 25 milliGRAM(s) Oral every 6 hours PRN Severe Pain (7 - 10)    Vital Signs Last 24 Hrs  T(F): 98.3 (28 Apr 2021 07:30), Max: 98.3 (28 Apr 2021 07:30)  HR: 78 (28 Apr 2021 07:30) (78 - 94)  BP: 145/88 (28 Apr 2021 07:30) (145/88 - 172/72)  RR: 15 (28 Apr 2021 07:30) (15 - 16)  SpO2: 97% (28 Apr 2021 07:30) (97% - 99%)  I&O's Summary    PHYSICAL EXAM:  General: NAD, alert and oriented.   ENT: Moist mucous membranes, no thrush  Neck: Supple, No JVD  Lungs: Clear to auscultation bilaterally, good air entry, non-labored breathing  Cardio: RRR, S1/S2, No murmur  Abdomen: Soft, Nontender, Nondistended; Bowel sounds present  Extremities: No calf tenderness, No pitting edema    LABS:                        12.9   5.44  )-----------( 252      ( 26 Apr 2021 06:58 )             40.4     04-27    140  |  105  |  16  ----------------------------<  156  4.2   |  27  |  0.71    Ca    9.3      27 Apr 2021 06:14  Mg     1.7     04-27    TPro  7.7  /  Alb  3.1  /  TBili  0.3  /  DBili  x   /  AST  70  /  ALT  78  /  AlkPhos  86  04-26        eGFR if Non African American: 90 mL/min/1.73M2 (04-27-21 @ 06:14)  eGFR if African American: 105 mL/min/1.73M2 (04-27-21 @ 06:14)      04-14 Chol 206 mg/dL LDL -- HDL 37 mg/dL Trig 195 mg/dL      POCT Blood Glucose.: 107 mg/dL (28 Apr 2021 07:30)  POCT Blood Glucose.: 108 mg/dL (27 Apr 2021 22:17)  POCT Blood Glucose.: 157 mg/dL (27 Apr 2021 16:37)  POCT Blood Glucose.: 160 mg/dL (27 Apr 2021 12:05)      RADIOLOGY & ADDITIONAL TESTS:    Care Discussed with Consultants/Other Providers:   PMR, Dr. Dunn Patient is a 65y old  Female who presents with a chief complaint of L lateral medullary CVA  1.4 (27 Apr 2021 19:56)      24 HOUR EVENTS:  No overnight events reported.   start hydrochlorothiazide 12.5 mg qd    SUBJECTIVE:  Patient seen and examined at bedside.   Has a headache. Denies having any acute weakness, vision changes, or spasms in back of head.     ALLERGIES:  No Known Allergies    MEDICATIONS  (STANDING):  amLODIPine   Tablet 10 milliGRAM(s) Oral daily  AQUAPHOR (petrolatum Ointment) 1 Application(s) Topical two times a day  aspirin enteric coated 81 milliGRAM(s) Oral daily  atorvastatin 80 milliGRAM(s) Oral at bedtime  clopidogrel Tablet 75 milliGRAM(s) Oral daily  dextrose 40% Gel 15 Gram(s) Oral once  dextrose 5%. 1000 milliLiter(s) (50 mL/Hr) IV Continuous <Continuous>  dextrose 5%. 1000 milliLiter(s) (100 mL/Hr) IV Continuous <Continuous>  dextrose 50% Injectable 25 Gram(s) IV Push once  dextrose 50% Injectable 12.5 Gram(s) IV Push once  dextrose 50% Injectable 25 Gram(s) IV Push once  enoxaparin Injectable 40 milliGRAM(s) SubCutaneous daily  gabapentin 600 milliGRAM(s) Oral at bedtime  gabapentin 300 milliGRAM(s) Oral <User Schedule>  gabapentin 600 milliGRAM(s) Oral <User Schedule>  glucagon  Injectable 1 milliGRAM(s) IntraMuscular once  insulin glargine Injectable (LANTUS) 35 Unit(s) SubCutaneous at bedtime  insulin lispro (ADMELOG) corrective regimen sliding scale   SubCutaneous three times a day before meals  insulin lispro (ADMELOG) corrective regimen sliding scale   SubCutaneous at bedtime  insulin lispro Injectable (ADMELOG) 10 Unit(s) SubCutaneous three times a day before meals  lidocaine   Patch 1 Patch Transdermal every 24 hours  lisinopril 20 milliGRAM(s) Oral two times a day  magnesium oxide 400 milliGRAM(s) Oral with breakfast  metFORMIN 500 milliGRAM(s) Oral <User Schedule>  omega-3-Acid Ethyl Esters 2 Gram(s) Oral two times a day  pantoprazole    Tablet 40 milliGRAM(s) Oral before breakfast    MEDICATIONS  (PRN):  acetaminophen   Tablet .. 650 milliGRAM(s) Oral every 6 hours PRN Temp greater or equal to 38C (100.4F), Mild Pain (1 - 3)  ondansetron    Tablet 4 milliGRAM(s) Oral every 6 hours PRN Nausea and/or Vomiting  senna 2 Tablet(s) Oral at bedtime PRN Constipation  traMADol 25 milliGRAM(s) Oral every 6 hours PRN Severe Pain (7 - 10)    Vital Signs Last 24 Hrs  T(F): 98.3 (28 Apr 2021 07:30), Max: 98.3 (28 Apr 2021 07:30)  HR: 78 (28 Apr 2021 07:30) (78 - 94)  BP: 145/88 (28 Apr 2021 07:30) (145/88 - 172/72)  RR: 15 (28 Apr 2021 07:30) (15 - 16)  SpO2: 97% (28 Apr 2021 07:30) (97% - 99%)  I&O's Summary    PHYSICAL EXAM:  General: NAD, alert and oriented.   ENT: Moist mucous membranes, no thrush  Neck: Supple, No JVD  Lungs: Clear to auscultation bilaterally, good air entry, non-labored breathing  Cardio: RRR, S1/S2, No murmur  Abdomen: Soft, Nontender, Nondistended; Bowel sounds present  Extremities: No calf tenderness, No pitting edema    LABS:                        12.9   5.44  )-----------( 252      ( 26 Apr 2021 06:58 )             40.4     04-27    140  |  105  |  16  ----------------------------<  156  4.2   |  27  |  0.71    Ca    9.3      27 Apr 2021 06:14  Mg     1.7     04-27    TPro  7.7  /  Alb  3.1  /  TBili  0.3  /  DBili  x   /  AST  70  /  ALT  78  /  AlkPhos  86  04-26        eGFR if Non African American: 90 mL/min/1.73M2 (04-27-21 @ 06:14)  eGFR if African American: 105 mL/min/1.73M2 (04-27-21 @ 06:14)      04-14 Chol 206 mg/dL LDL -- HDL 37 mg/dL Trig 195 mg/dL      POCT Blood Glucose.: 107 mg/dL (28 Apr 2021 07:30)  POCT Blood Glucose.: 108 mg/dL (27 Apr 2021 22:17)  POCT Blood Glucose.: 157 mg/dL (27 Apr 2021 16:37)  POCT Blood Glucose.: 160 mg/dL (27 Apr 2021 12:05)      RADIOLOGY & ADDITIONAL TESTS:    < from: CT Head No Cont (04.26.21 @ 10:10) >    IMPRESSION:    Previously seen tiny posterolateral acute medullary infarct is not well appreciated on the current examination.    No hydrocephalus, mass effect, midline shift, acute intracranial hemorrhage, or brain edema.    Periapical lucency surrounding the left first maxillary premolar, correlate for the presence of periapical/periodontal disease..    < end of copied text >    Care Discussed with Consultants/Other Providers:   PMR, Dr. Dunn

## 2021-04-28 NOTE — PROGRESS NOTE ADULT - ASSESSMENT
Assessment/Plan:  RUFINO MARTINEZ is a 65 y.o. 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        Comprehensive Multidisciplinary Rehab Program:  - Contt comprehensive rehab program, PT/OT/SLP 3 hours a day, 5 days a week.  - Precautions: falls    #L Lateral Medullary CVA  - Continue ASA and Plavix. Last dose of Plavix 5/5/21  - Continue Lipitor 80mg QHS, Fish oil  - LON 4/21/21: no cardiac source of embolism  - F/u neuro outpatient, Dr. Owen  - Due to severe headache, ordered CTH stat 4/26. CTH stable. Likely vestibulo-ocular component.  --Trial vestibular exercises as tolerated in PT  --educated on risk factors for future strokes and how to address    #HTN  --BP controlled  --Continue Amlodipine 10mg daily  - Lisinopril 20mg BID  -SBP goal 110-140  -  hospitalist following    #DM  - Lantus 35U QHS   -Admelog to 10U TID AC-  -Metformin   - FS and ISS  - Monitor-- hospitalist following and will adjust PRN      Pain Management:  - Tylenol PRN, Gabapentin, Tramadol (has nausea with high dose), Lidocaine patch  - Cont.  Gabapentin 300/600/600 mg  --Daily headache-- Mag Ox for H/A ppx.  - Avoid sedating medications that may interfere with cognitive recovery    GI/Bowel:  - At risk for constipation due to neurologic diagnosis, immobility and/or medication use  - Senna QHS, Miralax PRN Daily  - GI ppx: Protonix    /Bladder:   - At risk for incontinence and retention due to neurologic diagnosis and limited mobility  -  patient voids independently--continent with low PVR    DVT ppx:  - Lovenox  - SCDs    Dysphagia r/o aspiration  - MBS recommended by SLP but patient refused.  --tolerating regular diet    IDT 4/27/21:  - SW: lives in a private house with 10STE, 8steps with 1 HR inside. She lives with 2 daughters, independent prior with no DME.  - OT: supervision eating, UBD, LBD, bathing, toilet transfers, toileting. CG shower transfer  - PT: close supervision-CG transfers & ambulation 150ft.; CG 16 steps  - SLP: regular/thins, EMST, mild dysarthria. Decreased attention. Patient refused MBS.  - Will need family training  - EDOD:  5/1/21 to home

## 2021-04-29 ENCOUNTER — TRANSCRIPTION ENCOUNTER (OUTPATIENT)
Age: 66
End: 2021-04-29

## 2021-04-29 LAB
ALBUMIN SERPL ELPH-MCNC: 3 G/DL — LOW (ref 3.3–5)
ALP SERPL-CCNC: 79 U/L — SIGNIFICANT CHANGE UP (ref 40–120)
ALT FLD-CCNC: 83 U/L — HIGH (ref 10–45)
ANION GAP SERPL CALC-SCNC: 11 MMOL/L — SIGNIFICANT CHANGE UP (ref 5–17)
AST SERPL-CCNC: 89 U/L — HIGH (ref 10–40)
BILIRUB SERPL-MCNC: 0.3 MG/DL — SIGNIFICANT CHANGE UP (ref 0.2–1.2)
BUN SERPL-MCNC: 22 MG/DL — SIGNIFICANT CHANGE UP (ref 7–23)
CALCIUM SERPL-MCNC: 9.6 MG/DL — SIGNIFICANT CHANGE UP (ref 8.4–10.5)
CHLORIDE SERPL-SCNC: 103 MMOL/L — SIGNIFICANT CHANGE UP (ref 96–108)
CO2 SERPL-SCNC: 25 MMOL/L — SIGNIFICANT CHANGE UP (ref 22–31)
CREAT SERPL-MCNC: 0.64 MG/DL — SIGNIFICANT CHANGE UP (ref 0.5–1.3)
GLUCOSE BLDC GLUCOMTR-MCNC: 110 MG/DL — HIGH (ref 70–99)
GLUCOSE BLDC GLUCOMTR-MCNC: 143 MG/DL — HIGH (ref 70–99)
GLUCOSE BLDC GLUCOMTR-MCNC: 171 MG/DL — HIGH (ref 70–99)
GLUCOSE BLDC GLUCOMTR-MCNC: 94 MG/DL — SIGNIFICANT CHANGE UP (ref 70–99)
GLUCOSE SERPL-MCNC: 125 MG/DL — HIGH (ref 70–99)
POTASSIUM SERPL-MCNC: 4.2 MMOL/L — SIGNIFICANT CHANGE UP (ref 3.5–5.3)
POTASSIUM SERPL-SCNC: 4.2 MMOL/L — SIGNIFICANT CHANGE UP (ref 3.5–5.3)
PROT SERPL-MCNC: 7.5 G/DL — SIGNIFICANT CHANGE UP (ref 6–8.3)
SODIUM SERPL-SCNC: 139 MMOL/L — SIGNIFICANT CHANGE UP (ref 135–145)

## 2021-04-29 PROCEDURE — 99232 SBSQ HOSP IP/OBS MODERATE 35: CPT

## 2021-04-29 RX ORDER — INSULIN LISPRO 100/ML
8 VIAL (ML) SUBCUTANEOUS
Refills: 0 | Status: DISCONTINUED | OUTPATIENT
Start: 2021-04-29 | End: 2021-04-29

## 2021-04-29 RX ORDER — INSULIN GLARGINE 100 [IU]/ML
30 INJECTION, SOLUTION SUBCUTANEOUS AT BEDTIME
Refills: 0 | Status: DISCONTINUED | OUTPATIENT
Start: 2021-04-29 | End: 2021-05-01

## 2021-04-29 RX ORDER — INSULIN LISPRO 100/ML
6 VIAL (ML) SUBCUTANEOUS ONCE
Refills: 0 | Status: COMPLETED | OUTPATIENT
Start: 2021-04-29 | End: 2021-04-29

## 2021-04-29 RX ORDER — ENOXAPARIN SODIUM 100 MG/ML
28 INJECTION SUBCUTANEOUS
Qty: 8.4 | Refills: 0
Start: 2021-04-29 | End: 2021-05-28

## 2021-04-29 RX ORDER — METFORMIN HYDROCHLORIDE 850 MG/1
1 TABLET ORAL
Qty: 30 | Refills: 0
Start: 2021-04-29 | End: 2021-05-28

## 2021-04-29 RX ORDER — ENOXAPARIN SODIUM 100 MG/ML
30 INJECTION SUBCUTANEOUS
Qty: 9 | Refills: 0
Start: 2021-04-29 | End: 2021-05-28

## 2021-04-29 RX ORDER — ACETAMINOPHEN 500 MG
2 TABLET ORAL
Qty: 0 | Refills: 0 | DISCHARGE
Start: 2021-04-29

## 2021-04-29 RX ORDER — ISOPROPYL ALCOHOL, BENZOCAINE .7; .06 ML/ML; ML/ML
1 SWAB TOPICAL
Qty: 120 | Refills: 1
Start: 2021-04-29 | End: 2021-06-27

## 2021-04-29 RX ORDER — INSULIN LISPRO 100/ML
10 VIAL (ML) SUBCUTANEOUS
Qty: 9 | Refills: 0
Start: 2021-04-29 | End: 2021-05-28

## 2021-04-29 RX ORDER — INSULIN LISPRO 100/ML
6 VIAL (ML) SUBCUTANEOUS
Refills: 0 | Status: DISCONTINUED | OUTPATIENT
Start: 2021-04-29 | End: 2021-04-29

## 2021-04-29 RX ORDER — INSULIN GLARGINE 100 [IU]/ML
28 INJECTION, SOLUTION SUBCUTANEOUS AT BEDTIME
Refills: 0 | Status: DISCONTINUED | OUTPATIENT
Start: 2021-04-29 | End: 2021-04-29

## 2021-04-29 RX ORDER — INSULIN LISPRO 100/ML
10 VIAL (ML) SUBCUTANEOUS
Refills: 0 | Status: DISCONTINUED | OUTPATIENT
Start: 2021-04-29 | End: 2021-05-01

## 2021-04-29 RX ORDER — INSULIN LISPRO 100/ML
6 VIAL (ML) SUBCUTANEOUS
Qty: 5.4 | Refills: 0
Start: 2021-04-29 | End: 2021-05-28

## 2021-04-29 RX ADMIN — LIDOCAINE 1 PATCH: 4 CREAM TOPICAL at 00:00

## 2021-04-29 RX ADMIN — Medication 1 APPLICATION(S): at 20:18

## 2021-04-29 RX ADMIN — PANTOPRAZOLE SODIUM 40 MILLIGRAM(S): 20 TABLET, DELAYED RELEASE ORAL at 06:00

## 2021-04-29 RX ADMIN — INSULIN GLARGINE 30 UNIT(S): 100 INJECTION, SOLUTION SUBCUTANEOUS at 22:33

## 2021-04-29 RX ADMIN — Medication 2: at 12:18

## 2021-04-29 RX ADMIN — ENOXAPARIN SODIUM 40 MILLIGRAM(S): 100 INJECTION SUBCUTANEOUS at 12:21

## 2021-04-29 RX ADMIN — MAGNESIUM OXIDE 400 MG ORAL TABLET 400 MILLIGRAM(S): 241.3 TABLET ORAL at 07:43

## 2021-04-29 RX ADMIN — Medication 12.5 MILLIGRAM(S): at 05:59

## 2021-04-29 RX ADMIN — Medication 2 GRAM(S): at 16:54

## 2021-04-29 RX ADMIN — GABAPENTIN 300 MILLIGRAM(S): 400 CAPSULE ORAL at 05:59

## 2021-04-29 RX ADMIN — GABAPENTIN 600 MILLIGRAM(S): 400 CAPSULE ORAL at 22:21

## 2021-04-29 RX ADMIN — LISINOPRIL 20 MILLIGRAM(S): 2.5 TABLET ORAL at 16:53

## 2021-04-29 RX ADMIN — AMLODIPINE BESYLATE 10 MILLIGRAM(S): 2.5 TABLET ORAL at 06:00

## 2021-04-29 RX ADMIN — CLOPIDOGREL BISULFATE 75 MILLIGRAM(S): 75 TABLET, FILM COATED ORAL at 12:21

## 2021-04-29 RX ADMIN — LIDOCAINE 1 PATCH: 4 CREAM TOPICAL at 15:58

## 2021-04-29 RX ADMIN — LISINOPRIL 20 MILLIGRAM(S): 2.5 TABLET ORAL at 06:00

## 2021-04-29 RX ADMIN — LIDOCAINE 1 PATCH: 4 CREAM TOPICAL at 12:21

## 2021-04-29 RX ADMIN — METFORMIN HYDROCHLORIDE 500 MILLIGRAM(S): 850 TABLET ORAL at 16:53

## 2021-04-29 RX ADMIN — ATORVASTATIN CALCIUM 80 MILLIGRAM(S): 80 TABLET, FILM COATED ORAL at 22:21

## 2021-04-29 RX ADMIN — Medication 1 APPLICATION(S): at 06:00

## 2021-04-29 RX ADMIN — Medication 10 UNIT(S): at 07:43

## 2021-04-29 RX ADMIN — Medication 2 GRAM(S): at 06:00

## 2021-04-29 RX ADMIN — GABAPENTIN 600 MILLIGRAM(S): 400 CAPSULE ORAL at 12:21

## 2021-04-29 RX ADMIN — Medication 81 MILLIGRAM(S): at 12:21

## 2021-04-29 RX ADMIN — Medication 6 UNIT(S): at 17:11

## 2021-04-29 RX ADMIN — Medication 10 UNIT(S): at 12:20

## 2021-04-29 NOTE — DISCHARGE NOTE PROVIDER - NPI NUMBER (FOR SYSADMIN USE ONLY) :
[[id number]],[[id number]],[[id number]],[[id number]] [[id number]],[[id number]]

## 2021-04-29 NOTE — PROGRESS NOTE ADULT - ASSESSMENT
Assessment/Plan:  RUFINO MARTINEZ is a 65 y.o. 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        Comprehensive Multidisciplinary Rehab Program:  - Contt comprehensive rehab program, PT/OT/SLP 3 hours a day, 5 days a week.  - Precautions: falls    #L Lateral Medullary CVA  - Continue ASA and Plavix. Last dose of Plavix 5/5/21  - Continue Lipitor 80mg QHS, Fish oil  - LON 4/21/21: no cardiac source of embolism  - F/u neuro outpatient, Dr. Owen  - Due to severe headache, ordered CTH stat 4/26. CTH stable. Likely vestibulo-ocular component.  --Trial vestibular exercises as tolerated in PT  --educated on risk factors for future strokes and how to address    #HTN  --BP controlled  --Continue Amlodipine 10mg daily  - Lisinopril 20mg BID  -SBP goal 110-140  -  hospitalist following    #DM  - Lantus 35U QHS   -Admelog to 10U TID AC-  -Metformin   - FS and ISS  - Monitor-- hospitalist following and will adjust PRN  - RN to do insulin teaching      Pain Management:  - Tylenol PRN, Gabapentin, Tramadol (has nausea with high dose), Lidocaine patch  - Cont.  Gabapentin 300/600/600 mg  --Daily headache-- Mag Ox for H/A ppx.  - Avoid sedating medications that may interfere with cognitive recovery    GI/Bowel:  - At risk for constipation due to neurologic diagnosis, immobility and/or medication use  - Senna QHS, Miralax PRN Daily  - GI ppx: Protonix    /Bladder:   - At risk for incontinence and retention due to neurologic diagnosis and limited mobility  -  patient voids independently--continent with low PVR    DVT ppx:  - Lovenox  - SCDs    Dysphagia r/o aspiration  - MBS recommended by SLP but patient refused.  --tolerating regular diet    IDT 4/27/21:  - SW: lives in a private house with 10STE, 8steps with 1 HR inside. She lives with 2 daughters, independent prior with no DME.  - OT: supervision eating, UBD, LBD, bathing, toilet transfers, toileting. CG shower transfer  - PT: close supervision-CG transfers & ambulation 150ft.; CG 16 steps  - SLP: regular/thins, EMST, mild dysarthria. Decreased attention. Patient refused MBS.  - Will need family training  - EDOD:  5/1/21 to home   Assessment/Plan:  RUFINO MARTINEZ is a 65 y.o. 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        Comprehensive Multidisciplinary Rehab Program:  - Contt comprehensive rehab program, PT/OT/SLP 3 hours a day, 5 days a week.  - Precautions: falls    #L Lateral Medullary CVA  - Continue ASA and Plavix. Last dose of Plavix 5/5/21  - Continue Lipitor 80mg QHS, Fish oil  - LON 4/21/21: no cardiac source of embolism  - F/u neuro outpatient, Dr. Owen  - Due to severe headache, ordered CTH stat 4/26. CTH stable. Likely vestibulo-ocular component.  --Trial vestibular exercises as tolerated in PT  --educated on risk factors for future strokes and how to address    #HTN  --BP controlled  --Continue Amlodipine 10mg daily  - Lisinopril 20mg BID  -SBP goal 110-140  -  hospitalist following    #DM  - Lantus 30U QHS   -Admelog to 10U TID AC-  -Metformin   - FS and ISS  - Monitor-- hospitalist following and will adjust PRN  - RN to do insulin teaching  --Will clarify with Hospitalist appropriate insulin dosing for pt's discharge home.        Pain Management:  - Tylenol PRN, Gabapentin, Tramadol (has not needed in past couple days),   --Lidocaine patch-  - Cont.  Gabapentin 300/600/600 mg--pain improving  --Daily headache-- Mag Ox for H/A ppx.  - Avoid sedating medications that may interfere with cognitive recovery    GI/Bowel:  - At risk for constipation due to neurologic diagnosis, immobility and/or medication use  - Senna QHS, Miralax PRN Daily  - GI ppx: Protonix    /Bladder:   - At risk for incontinence and retention due to neurologic diagnosis and limited mobility  -  patient voids independently--continent with low PVR    DVT ppx:  - Lovenox  - SCDs    Dysphagia r/o aspiration  - MBS recommended by SLP but patient refused.  --tolerating regular diet    IDT 4/27/21:  - SW: lives in a private house with 10STE, 8steps with 1 HR inside. She lives with 2 daughters, independent prior with no DME.  - OT: supervision eating, UBD, LBD, bathing, toilet transfers, toileting. CG shower transfer  - PT: close supervision-CG transfers & ambulation 150ft.; CG 16 steps  - SLP: regular/thins, EMST, mild dysarthria. Decreased attention. Patient refused MBS.  - Will need family training  - EDOD:  5/1/21 to home

## 2021-04-29 NOTE — DISCHARGE NOTE PROVIDER - NSDCACTIVITY_GEN_ALL_CORE
Sex allowed/Do not drive or operate machinery/Stairs allowed/Walking - Indoors allowed/No heavy lifting/straining

## 2021-04-29 NOTE — DISCHARGE NOTE PROVIDER - HOSPITAL COURSE
HPI:  65F hx of HTN, HLD, T2DM off meds since 2018 and has not followed up with PMD since then presenting with R sided numbness. Pt related she has not felt well which started about 4 days ago and felt her balance was off and occasional dizziness. Today after waking up, she took a shower at 11AM and at the time noticed exquisite painful sensitivity in the R lower extremity and throughout the day noted numbness in the RLE and RUE and R side of face and even on the R torso. She went to get a pedicure at around 2 PM and noticed again exquisite painful sensitivity in the R foot when placed in water. She did not have slurring of speech, visual issues, dysphagia, or focal weakness. She went to see PMD and advised ED visit to r/o CVA. In ED, out of window for tPA. Afebrile BP: 187/104 P: 74 sat 100% on RA. CT head and CT angio neg for acute infarct but with focal stenosis in bl M2 MCA and bl PCA. Blood glucose was 511. S/p IV Labetalol 20mg in ED and 6U regular insulin. Pt feels well otherwise - wanted to sign out AMA because she didn't want to be moved upstairs where her mother had passed.     CTH unremarkable. 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 CTA revealed multiple B/L severe focal stenoses prox M2 branch and both MCAs B/L in PCA. As per neuro, continue high dose statin, ASA and Plavix for three weeks (started 4/14/21) then just ASA monotherapy. Cardio rec LON, done 4/21/21 which showed no cardiac source of embolism.    Patient was medically stable for discharge to [facility] for acute rehab 4/21/21.   (21 Apr 2021 15:46)      Rehab course significant for:  - HA: improved on Tylenol and Tramadol, now not requiring Tramadol. Likely had a vestibulo-ocular component. CTH 4/26/21 for severe HA was stable.   - Neuropathic pain in RUE and RLE: titrated Gabapentin up to 300/600/600 which has significantly improved pain in RUE and with tolerable pain in RLE.   - Diabetes: patient previously had uncontrolled DM with HbA1c 13.9 4/14/21. Her blood sugars were well controlled on Metformin 500mg daily, Lantus 30U QHS, and Lispro 10U TID AC. Regimen confirmed with diabetic nurse and hospitalist and patient given insulin     Functional Status:      All other medical co-morbidities were stable. Patient tolerated course of inpatient PT/OT/SLP rehab with significant improvements and met rehab goals prior to discharge. Patient was medically cleared on ___ for discharge to ___.     Outpatient Follow-ups:   HPI:  65F hx of HTN, HLD, T2DM off meds since 2018 and has not followed up with PMD since then presenting with R sided numbness. Pt related she has not felt well which started about 4 days ago and felt her balance was off and occasional dizziness. Today after waking up, she took a shower at 11AM and at the time noticed exquisite painful sensitivity in the R lower extremity and throughout the day noted numbness in the RLE and RUE and R side of face and even on the R torso. She went to get a pedicure at around 2 PM and noticed again exquisite painful sensitivity in the R foot when placed in water. She did not have slurring of speech, visual issues, dysphagia, or focal weakness. She went to see PMD and advised ED visit to r/o CVA. In ED, out of window for tPA. Afebrile BP: 187/104 P: 74 sat 100% on RA. CT head and CT angio neg for acute infarct but with focal stenosis in bl M2 MCA and bl PCA. Blood glucose was 511. S/p IV Labetalol 20mg in ED and 6U regular insulin. Pt feels well otherwise - wanted to sign out AMA because she didn't want to be moved upstairs where her mother had passed.     CTH unremarkable. 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 CTA revealed multiple B/L severe focal stenoses prox M2 branch and both MCAs B/L in PCA. As per neuro, continue high dose statin, ASA and Plavix for three weeks (started 4/14/21) then just ASA monotherapy. Cardio rec LON, done 4/21/21 which showed no cardiac source of embolism.    Patient was medically stable for discharge to [facility] for acute rehab 4/21/21.   (21 Apr 2021 15:46)      Rehab course significant for:  - HA: improved on Tylenol and Tramadol, now not requiring Tramadol. Likely had a vestibulo-ocular component. CTH 4/26/21 for severe HA was stable.   - Neuropathic pain in RUE and RLE: titrated Gabapentin up to 300/600/600 which has significantly improved pain in RUE and with tolerable pain in RLE.   - Diabetes: patient previously had uncontrolled DM with HbA1c 13.9 4/14/21. Her blood sugars were well controlled on Metformin 500mg daily, Lantus 30U QHS, and Lispro 10U TID AC. Regimen confirmed with diabetic nurse and hospitalist and patient given insulin     Functional Status:  - OT: supervision eating, UBD, LBD, bathing, toilet transfers, toileting. CG shower transfer  - PT: close supervision-CG transfers & ambulation 150ft.; CG 16 steps  - SLP: regular/thins, EMST, mild dysarthria. Decreased attention. Patient refused MBS.    All other medical co-morbidities were stable. Patient tolerated course of inpatient PT/OT/SLP rehab with significant improvements and met rehab goals prior to discharge. Patient was medically cleared on 5/1/21 for discharge to home.     Outpatient Follow-ups:  Neurology: Jayne Owen  Endocrine: [facility] [phone]  PCP: Tanisha Carlson

## 2021-04-29 NOTE — PROGRESS NOTE ADULT - SUBJECTIVE AND OBJECTIVE BOX
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     SUBJECTIVE:  Patient seen and examined at bedside.   Pt feels very well today - no headache.     ALLERGIES:  No Known Allergies    MEDICATIONS  (STANDING):  amLODIPine   Tablet 10 milliGRAM(s) Oral daily  AQUAPHOR (petrolatum Ointment) 1 Application(s) Topical two times a day  aspirin enteric coated 81 milliGRAM(s) Oral daily  atorvastatin 80 milliGRAM(s) Oral at bedtime  clopidogrel Tablet 75 milliGRAM(s) Oral daily  dextrose 40% Gel 15 Gram(s) Oral once  dextrose 5%. 1000 milliLiter(s) (50 mL/Hr) IV Continuous <Continuous>  dextrose 5%. 1000 milliLiter(s) (100 mL/Hr) IV Continuous <Continuous>  dextrose 50% Injectable 12.5 Gram(s) IV Push once  dextrose 50% Injectable 25 Gram(s) IV Push once  dextrose 50% Injectable 25 Gram(s) IV Push once  enoxaparin Injectable 40 milliGRAM(s) SubCutaneous daily  gabapentin 300 milliGRAM(s) Oral <User Schedule>  gabapentin 600 milliGRAM(s) Oral <User Schedule>  gabapentin 600 milliGRAM(s) Oral at bedtime  glucagon  Injectable 1 milliGRAM(s) IntraMuscular once  hydrochlorothiazide 12.5 milliGRAM(s) Oral daily  insulin glargine Injectable (LANTUS) 30 Unit(s) SubCutaneous at bedtime  insulin lispro (ADMELOG) corrective regimen sliding scale   SubCutaneous three times a day before meals  insulin lispro (ADMELOG) corrective regimen sliding scale   SubCutaneous at bedtime  insulin lispro Injectable (ADMELOG) 10 Unit(s) SubCutaneous three times a day before meals  lidocaine   Patch 1 Patch Transdermal every 24 hours  lisinopril 20 milliGRAM(s) Oral two times a day  magnesium oxide 400 milliGRAM(s) Oral with breakfast  metFORMIN 500 milliGRAM(s) Oral <User Schedule>  omega-3-Acid Ethyl Esters 2 Gram(s) Oral two times a day  pantoprazole    Tablet 40 milliGRAM(s) Oral before breakfast    MEDICATIONS  (PRN):  acetaminophen   Tablet .. 650 milliGRAM(s) Oral every 6 hours PRN Temp greater or equal to 38C (100.4F), Mild Pain (1 - 3)  ondansetron    Tablet 4 milliGRAM(s) Oral every 6 hours PRN Nausea and/or Vomiting  senna 2 Tablet(s) Oral at bedtime PRN Constipation  traMADol 25 milliGRAM(s) Oral every 6 hours PRN Severe Pain (7 - 10)    Vital Signs Last 24 Hrs  T(F): 97.8 (29 Apr 2021 07:33), Max: 97.9 (28 Apr 2021 19:36)  HR: 73 (29 Apr 2021 07:33) (73 - 98)  BP: 145/78 (29 Apr 2021 07:33) (138/82 - 156/92)  RR: 15 (29 Apr 2021 07:33) (15 - 16)  SpO2: 99% (29 Apr 2021 07:33) (97% - 99%)  I&O's Summary    28 Apr 2021 07:01  -  29 Apr 2021 07:00  --------------------------------------------------------  IN: 600 mL / OUT: 0 mL / NET: 600 mL      PHYSICAL EXAM:  General: NAD, A/O x 3  ENT: Moist mucous membranes, no thrush  Neck: Supple, No JVD  Lungs: Clear to auscultation bilaterally, good air entry, non-labored breathing  Cardio: RRR, S1/S2, No murmur  Abdomen: Soft, Nontender, Nondistended; Bowel sounds present  Extremities: No calf tenderness, No pitting edema    LABS:    04-29    139  |  103  |  22  ----------------------------<  125  4.2   |  25  |  0.64    Ca    9.6      29 Apr 2021 05:30  Mg     1.7     04-27    TPro  7.5  /  Alb  3.0  /  TBili  0.3  /  DBili  x   /  AST  89  /  ALT  83  /  AlkPhos  79  04-29        eGFR if African American: 110 mL/min/1.73M2 (04-29-21 @ 05:30)  eGFR if Non African American: 95 mL/min/1.73M2 (04-29-21 @ 05:30)      04-14 Chol 206 mg/dL LDL -- HDL 37 mg/dL Trig 195 mg/dL              POCT Blood Glucose.: 110 mg/dL (29 Apr 2021 07:33)  POCT Blood Glucose.: 115 mg/dL (28 Apr 2021 21:59)  POCT Blood Glucose.: 131 mg/dL (28 Apr 2021 17:05)  POCT Blood Glucose.: 126 mg/dL (28 Apr 2021 12:15)          RADIOLOGY & ADDITIONAL TESTS:    Care Discussed with Consultants/Other Providers:

## 2021-04-29 NOTE — CHART NOTE - NSCHARTNOTEFT_GEN_A_CORE
Nutrition Follow Up Note  Hospital Course   (Per Electronic Medical Record)    Source:  Patient [X]  Medical Record [X]      Diet:   Consistent Carbohydrate DASH-TLC Diet w/ Thin Liquids  Tolerates Diet Consistency Well  No Chewing/Swallowing Difficulties  No Recent Nausea, Vomiting, Diarrhea or Constipation (as Per Patient)  Consumes % of Meals (as Per Documentation) - Good PO Intake/Appetite   Education Provided on Blood Glucose Management     Enteral/Parenteral Nutrition: Not Applicable    Current Weight: 154.9lb on 4/29 - Weight Most Likely an Error   Obtain Weights Daily  Obtain New Weight to Confirm     Pertinent Medications: MEDICATIONS  (STANDING):  amLODIPine   Tablet 10 milliGRAM(s) Oral daily  AQUAPHOR (petrolatum Ointment) 1 Application(s) Topical two times a day  aspirin enteric coated 81 milliGRAM(s) Oral daily  atorvastatin 80 milliGRAM(s) Oral at bedtime  clopidogrel Tablet 75 milliGRAM(s) Oral daily  dextrose 40% Gel 15 Gram(s) Oral once  dextrose 5%. 1000 milliLiter(s) (100 mL/Hr) IV Continuous <Continuous>  dextrose 5%. 1000 milliLiter(s) (50 mL/Hr) IV Continuous <Continuous>  dextrose 50% Injectable 25 Gram(s) IV Push once  dextrose 50% Injectable 12.5 Gram(s) IV Push once  dextrose 50% Injectable 25 Gram(s) IV Push once  enoxaparin Injectable 40 milliGRAM(s) SubCutaneous daily  gabapentin 600 milliGRAM(s) Oral at bedtime  gabapentin 300 milliGRAM(s) Oral <User Schedule>  gabapentin 600 milliGRAM(s) Oral <User Schedule>  glucagon  Injectable 1 milliGRAM(s) IntraMuscular once  hydrochlorothiazide 12.5 milliGRAM(s) Oral daily  insulin glargine Injectable (LANTUS) 30 Unit(s) SubCutaneous at bedtime  insulin lispro (ADMELOG) corrective regimen sliding scale   SubCutaneous three times a day before meals  insulin lispro (ADMELOG) corrective regimen sliding scale   SubCutaneous at bedtime  insulin lispro Injectable (ADMELOG) 10 Unit(s) SubCutaneous three times a day before meals  lidocaine   Patch 1 Patch Transdermal every 24 hours  lisinopril 20 milliGRAM(s) Oral two times a day  magnesium oxide 400 milliGRAM(s) Oral with breakfast  metFORMIN 500 milliGRAM(s) Oral <User Schedule>  omega-3-Acid Ethyl Esters 2 Gram(s) Oral two times a day  pantoprazole    Tablet 40 milliGRAM(s) Oral before breakfast    MEDICATIONS  (PRN):  acetaminophen   Tablet .. 650 milliGRAM(s) Oral every 6 hours PRN Temp greater or equal to 38C (100.4F), Mild Pain (1 - 3)  ondansetron    Tablet 4 milliGRAM(s) Oral every 6 hours PRN Nausea and/or Vomiting  senna 2 Tablet(s) Oral at bedtime PRN Constipation  traMADol 25 milliGRAM(s) Oral every 6 hours PRN Severe Pain (7 - 10)    Pertinent Labs:  04-29 Na139 mmol/L Glu 125 mg/dL<H> K+ 4.2 mmol/L Cr  0.64 mg/dL BUN 22 mg/dL 04-29 Alb 3.0 g/dL<L> 04-14 Chol 206 mg/dL<H> LDL --    HDL 37 mg/dL<L> Trig 195 mg/dL<H>    POCT (over Last 3 Days) - Ranging from 108-160    Skin: No Pressure Ulcers     Edema: None Noted     Last Bowel Movement: on 4/26    Estimated Needs:   [X] No Change Since Previous Assessment    Previous Nutrition Diagnosis:   Food Knowledge Deficit     Nutrition Diagnosis is [X] Ongoing - Education Provided on Blood Glucose Management     New Nutrition Diagnosis: [X] Not Applicable    Interventions:   1. Education Provided on Blood Glucose Management   2. Recommend Continue Nutrition Plan of Care     Monitoring & Evaluation:   [X] Weights   [X] PO Intake   [X] Skin Integrity   [X] Follow Up (Per Protocol)  [X] Tolerance to Diet Prescription   [X] Other: Labs & POCT    Registered Dietitian/Nutritionist Remains Available.  René Sotelo RDN    Pager # [phone]  Phone# [phone]

## 2021-04-29 NOTE — DISCHARGE NOTE PROVIDER - CARE PROVIDER_API CALL
Jayne Owen  NEUROLOGY  [address] [place] [address]  Phone: [phone]  Fax: ()-  Follow Up Time:     Gema Dunn (DO)  Brain Injury Medicine; PhysicalRehab Medicine  [address] [place] [address]  Phone: [phone]  Fax: [phone]  Follow Up Time:     [facility],   [address] [place] [address], [address]  Phone: [phone]  Fax: (   )    -  Follow Up Time: 2 weeks    Krysta Butler  MEDICINE  [address] [place] [address]  Phone: [phone]  Fax: [phone]  Follow Up Time:    Jayne Owen  NEUROLOGY  [address] [place] [address]  Phone: [phone]  Fax: ()-  Follow Up Time:     Gema Dunn (DO)  Brain Injury Medicine; PhysicalRehab Medicine  [address] [place] [address]  Phone: [phone]  Fax: [phone]  Follow Up Time:     Krysta Butler  MEDICINE  [address] [place] [address]  Phone: [phone]  Fax: [phone]  Follow Up Time:     Marie Dunne)  Family Medicine  [address] [place] [address]  Phone: [phone]  Fax: [phone]  Follow Up Time:

## 2021-04-29 NOTE — DISCHARGE NOTE PROVIDER - NSDCFUADDINST_GEN_ALL_CORE_FT
Supervision with all activities as advised by rehabilitation team    Avoid alcohol and unprescribed medications as these can impair the brain's recovery process.

## 2021-04-29 NOTE — PROGRESS NOTE ADULT - ASSESSMENT
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    #L Lateral Medullary CVA  -comprehensive rehab program -PT/OT/SLP per rehab team  -Pain management, bowel regimen per rehab   -ASA and Plavixx3 weeks followed by ASA monotherapy, last dose of Plavix will be 5/5/21  -Lipitor 80mg QHS, lovaza  -monitor LFTs  -LON 4/21/21: no cardiac source of embolism  -F/u neuro outpatient, Dr. Owen    #HTN  - improving  - lisinopril 20mg BID  - norvasc increased 5mg to 10mg   - start low dose hydrochlorothiazide (4/28), with repeat chem7 to check electrolytes in a week.     # Headaches  doubt related to hypertension with systolic 150s-160s.  - repeat CT head wnl on 4/26    #T2DM, uncontrolled - HbA1c 13.9  -patient stable on current insulin regimen with appropriate POC glucose. Will decrease admelog with starting metformin.  -Lantus dec to 30un qhs + Admelog 10U TID AC.  - metformin 500 mg w/ dinner. Slow titration up due to GI intolerability in the past. inc by 500 mg q week.   -FS and ISS  -Gabapentin per rehab    # Bilateral lower extremity hypopigmented macules   appears benign, likely related to sun exposure. Not infectious and pt is asymptomatic. consistent with idiopathic guttate hypomelanosis.   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    #L Lateral Medullary CVA  -comprehensive rehab program -PT/OT/SLP per rehab team  -Pain management, bowel regimen per rehab   -ASA and Plavixx3 weeks followed by ASA monotherapy, last dose of Plavix will be 5/5/21  -Lipitor 80mg QHS, lovaza  -monitor LFTs  -LON 4/21/21: no cardiac source of embolism  -F/u neuro outpatient, Dr. Owen    #HTN  - improving  - lisinopril 20mg BID  - norvasc increased 5mg to 10mg   - start low dose hydrochlorothiazide (4/28), with repeat chem7 to check electrolytes in a week.     # Headaches  doubt related to hypertension with systolic 150s-160s.  - repeat CT head wnl on 4/26    #T2DM, uncontrolled - HbA1c 13.9  - Tentative discharge home on: Lantus dec to 28un qhs + Admelog 6U TID AC. Pt may need to supplement admelog with a sliding scale. Will discuss further with diabetic nurse tomorrow.   - metformin 500 mg w/ dinner. Slow titration up due to GI intolerability in the past. inc by 500 mg q week by PCP or Endo.   -FS and ISS  -Gabapentin per rehab    # Bilateral lower extremity hypopigmented macules   appears benign, likely related to sun exposure. Not infectious and pt is asymptomatic. consistent with idiopathic guttate hypomelanosis.   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    #L Lateral Medullary CVA  -comprehensive rehab program -PT/OT/SLP per rehab team  -Pain management, bowel regimen per rehab   -ASA and Plavixx3 weeks followed by ASA monotherapy, last dose of Plavix will be 5/5/21  -Lipitor 80mg QHS, lovaza  -monitor LFTs  -LON 4/21/21: no cardiac source of embolism  -F/u neuro outpatient, Dr. Owen    #HTN  - improving  - lisinopril 20mg BID  - norvasc increased 5mg to 10mg   - start low dose hydrochlorothiazide (4/28), with repeat chem7 to check electrolytes in a week.     # Headaches  doubt related to hypertension with systolic 150s-160s.  - repeat CT head wnl on 4/26    #T2DM, uncontrolled - HbA1c 13.9  - Tentative discharge home on: Lantus dec to 30un qhs + Admelog 10U TID AC. Pt may need to supplement admelog with a sliding scale. Will discuss further with diabetic nurse tomorrow.   - metformin 500 mg w/ dinner. Slow titration up due to GI intolerability in the past. inc by 500 mg q week by PCP or Endo.   -FS and ISS  -Gabapentin per rehab  - contact social work to make sure pt has an apt with Tanisha Carlson with family Medicine.    # Bilateral lower extremity hypopigmented macules   appears benign, likely related to sun exposure. Not infectious and pt is asymptomatic. consistent with idiopathic guttate hypomelanosis.   - minimize sun exposure    DVT ppx - Lovenox, SCDs

## 2021-04-29 NOTE — PROGRESS NOTE ADULT - TIME BILLING
Discharge medications and instructions and rehab plan of care reviewed with patient thoroughly.  All questions answered.     Avoid alcohol and unprescribed medications as these can impair the brain's recovery process.
STAT HCT.   f/u on Patient.     Discussions with hospitalist and pt. regarding rehab plan of care.

## 2021-04-29 NOTE — PROVIDER CONTACT NOTE (OTHER) - ASSESSMENT
Pt AOx4, sleeping comfortably before medication administration, denies dizziness/weakness
pt. resting comfortably in bed no complaints at this time.
accucheck pre dinner 93   10units scheduled   RN asked DR Taz strong dose she recommends   MD ordered 6units pre dinner
no complains of discomfort. resting comfortably in bed.
pt. resting comfortably no complaints at this time.

## 2021-04-29 NOTE — DISCHARGE NOTE PROVIDER - CARE PROVIDERS DIRECT ADDRESSES
,DirectAddress_Unknown,[email or web address],DirectAddress_Unknown,DirectAddress_Unknown ,DirectAddress_Unknown,[email or web address],DirectAddress_Unknown,[email or web address]

## 2021-04-29 NOTE — DISCHARGE NOTE PROVIDER - PROVIDER TOKENS
PROVIDER:[TOKEN:[[id number]]],PROVIDER:[TOKEN:[[id number]]],FREE:[LAST:[[facility]],PHONE:[phone]],FAX:[(   )    -],ADDRESS:[[address] [place] [address], [address]],FOLLOWUP:[2 weeks]],PROVIDER:[TOKEN:[[id number]]] PROVIDER:[TOKEN:[[id number]]],PROVIDER:[TOKEN:[[id number]]],PROVIDER:[TOKEN:[[id number]]],PROVIDER:[TOKEN:[[id number]]]

## 2021-04-29 NOTE — DISCHARGE NOTE PROVIDER - NSDCCPCAREPLAN_GEN_ALL_CORE_FT
PRINCIPAL DISCHARGE DIAGNOSIS  Diagnosis: Stroke  Assessment and Plan of Treatment: You were admitted to acute rehab after a left lateral medullary stroke. Continue Aspirin 81mg daily and Lipitor 80mg at bedtime. Continue Plavix 75mg daily until 5/5/21, then STOP. Your CT of the head was stable on 4/26/21. You may continue to take Tylenol as needed for headaches, but no more than 3900mg in 24 hours.   - Follow up with your neurologist, Dr. Owen, upon discharge.  - Follow up with your physiatrist (rehab physician), in 4 weeks after discharge.      SECONDARY DISCHARGE DIAGNOSES  Diagnosis: HTN (hypertension)  Assessment and Plan of Treatment: Continue your blood pressure medications as prescribed. Follow up with your primary care physician, Dr. Tanisha Carlson, to monitor your blood pressure.    Diagnosis: Diabetes  Assessment and Plan of Treatment: Your hemoglobin A1c was 13.9% which is very high. You were started on insulin. Continue taking Lantus 30 units at bedtime. Lantus is long-acting. Continue taking Lispro 10 units before meals. Lispro is short-acting and you must eat a meal after injecting Lispro. Do not inject Lispro unless you have your meal in front of you to prevent your blood sugars going too low. Keep a snack or candy on you in case your blood sugar drops below 80.   Check your blood sugars before breakfast, lunch, and dinner, and at bedtime. Keep a log of your blood sugars and bring it to your endocrinologist. Follow up with your endocrinologist, Dr. Butler, or with [facility], and your primary care physician to monitor your blood sugars.

## 2021-04-29 NOTE — PROVIDER CONTACT NOTE (OTHER) - ACTION/TREATMENT ORDERED:
Lantus decreased from 45 units to 35 units.
pt. given another snack, MD gave ok to give 35 units of Lantus tonight.
MD made aware, OK to administer scheduled 35 units of Lantus at this time
RN gave patient instruction on INsulin Pen   RN demonstrated and she returned demonstrated  Handout also given   WIll review
per MD give apple juice and recheck Blood glucose, if Blood glucose above 100 ok to give Lantus.

## 2021-04-29 NOTE — PROGRESS NOTE ADULT - SUBJECTIVE AND OBJECTIVE BOX
HPI:  65F hx of HTN, HLD, T2DM off meds since 2018 and has not followed up with PMD since then presenting with R sided numbness. Pt related she has not felt well which started about 4 days ago and felt her balance was off and occasional dizziness. Today after waking up, she took a shower at 11AM and at the time noticed exquisite painful sensitivity in the R lower extremity and throughout the day noted numbness in the RLE and RUE and R side of face and even on the R torso. She went to get a pedicure at around 2 PM and noticed again exquisite painful sensitivity in the R foot when placed in water. She did not have slurring of speech, visual issues, dysphagia, or focal weakness. She went to see PMD and advised ED visit to r/o CVA. In ED, out of window for tPA. Afebrile BP: 187/104 P: 74 sat 100% on RA. CT head and CT angio neg for acute infarct but with focal stenosis in bl M2 MCA and bl PCA. Blood glucose was 511. S/p IV Labetalol 20mg in ED and 6U regular insulin. Pt feels well otherwise - wanted to sign out AMA because she didn't want to be moved upstairs where her mother had passed.     CTH unremarkable. 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 CTA revealed multiple B/L severe focal stenoses prox M2 branch and both MCAs B/L in PCA. As per neuro, continue high dose statin, ASA and Plavix for three weeks (started 4/14/21) then just ASA monotherapy. Cardio rec LON, done 4/21/21 which showed no cardiac source of embolism.    Patient was medically stable for discharge to [facility] for acute rehab 4/21/21.   (21 Apr 2021 15:46)      PAST MEDICAL & SURGICAL HISTORY:  HTN (hypertension)    HLD (hyperlipidemia)    Diabetes mellitus    History of ankle surgery        Subjective:  Feeling better. Neuropathic pain is much improved and headache is resolved. She slept well overnight.      Vital Signs Last 24 Hrs  T(C): 36.6 (29 Apr 2021 07:33), Max: 36.6 (28 Apr 2021 19:36)  T(F): 97.8 (29 Apr 2021 07:33), Max: 97.9 (28 Apr 2021 19:36)  HR: 73 (29 Apr 2021 07:33) (73 - 98)  BP: 145/78 (29 Apr 2021 07:33) (138/82 - 156/92)  BP(mean): --  RR: 15 (29 Apr 2021 07:33) (15 - 16)  SpO2: 99% (29 Apr 2021 07:33) (97% - 99%)      REVIEW OF SYMPTOMS  Neuro deficit--   Neuropathic pain    Physical Exam:  Gen - NAD, Comfortable  HEENT - NCAT, EOMI, MMM, no saccades  Neck - Supple, No limited ROM  Pulm - CTAB, No wheeze, No rhonchi, No crackles  Cardiovascular - RRR, S1S2, No m/r/g  Abdomen - Soft, NT/ND, +BS  Extremities - No C/C/E, No calf tenderness  Neuro-     Cognitive - AAOx3     Communication - Fluent, mild dysarthria     Attention: Intact         Memory: Recall --mild deficit     Cranial Nerves - PERRLA, EOMI, mild left facial droop, sensory impairment, dysarthria,     vestibulo-ocular reflex-- +dizziness with head rotation in horizontal or vertical motion when focusing on stationary object     +mild impairment with saccadic eye motion       Motor -                    LEFT    UE - ShAB 5/5, EF 5/5, EE 5/5, WE 5/5,  5/5                    RIGHT UE - ShAB 5/5, EF 5/5, EE 5/5, WE 5/5,  5/5                    LEFT    LE - HF 5/5, KE 5/5, DF 5/5, PF 5/5                    RIGHT LE - HF 5/5, KE 5/5, DF 5/5, PF 5/5        Sensory - increased sensitivity to right upper and lower  extremity and right low back     Coordination - FTN intact     Tone - normal    MSK: Cervical ROM WNL, No cervical spinal or paraspinal tenderness  Psychiatric - Mood stable, Affect WNL    RECENT LABS      04-29    139  |  103  |  22  ----------------------------<  125<H>  4.2   |  25  |  0.64    Ca    9.6      29 Apr 2021 05:30    TPro  7.5  /  Alb  3.0<L>  /  TBili  0.3  /  DBili  x   /  AST  89<H>  /  ALT  83<H>  /  AlkPhos  79  04-29                  RADIOLOGY/OTHER RESULTS      MEDICATIONS  (STANDING):  amLODIPine   Tablet 10 milliGRAM(s) Oral daily  AQUAPHOR (petrolatum Ointment) 1 Application(s) Topical two times a day  aspirin enteric coated 81 milliGRAM(s) Oral daily  atorvastatin 80 milliGRAM(s) Oral at bedtime  clopidogrel Tablet 75 milliGRAM(s) Oral daily  dextrose 40% Gel 15 Gram(s) Oral once  dextrose 5%. 1000 milliLiter(s) (50 mL/Hr) IV Continuous <Continuous>  dextrose 5%. 1000 milliLiter(s) (100 mL/Hr) IV Continuous <Continuous>  dextrose 50% Injectable 12.5 Gram(s) IV Push once  dextrose 50% Injectable 25 Gram(s) IV Push once  dextrose 50% Injectable 25 Gram(s) IV Push once  enoxaparin Injectable 40 milliGRAM(s) SubCutaneous daily  gabapentin 300 milliGRAM(s) Oral <User Schedule>  gabapentin 600 milliGRAM(s) Oral <User Schedule>  gabapentin 600 milliGRAM(s) Oral at bedtime  glucagon  Injectable 1 milliGRAM(s) IntraMuscular once  hydrochlorothiazide 12.5 milliGRAM(s) Oral daily  insulin glargine Injectable (LANTUS) 30 Unit(s) SubCutaneous at bedtime  insulin lispro (ADMELOG) corrective regimen sliding scale   SubCutaneous three times a day before meals  insulin lispro (ADMELOG) corrective regimen sliding scale   SubCutaneous at bedtime  insulin lispro Injectable (ADMELOG) 10 Unit(s) SubCutaneous three times a day before meals  lidocaine   Patch 1 Patch Transdermal every 24 hours  lisinopril 20 milliGRAM(s) Oral two times a day  magnesium oxide 400 milliGRAM(s) Oral with breakfast  metFORMIN 500 milliGRAM(s) Oral <User Schedule>  omega-3-Acid Ethyl Esters 2 Gram(s) Oral two times a day  pantoprazole    Tablet 40 milliGRAM(s) Oral before breakfast    MEDICATIONS  (PRN):  acetaminophen   Tablet .. 650 milliGRAM(s) Oral every 6 hours PRN Temp greater or equal to 38C (100.4F), Mild Pain (1 - 3)  ondansetron    Tablet 4 milliGRAM(s) Oral every 6 hours PRN Nausea and/or Vomiting  senna 2 Tablet(s) Oral at bedtime PRN Constipation  traMADol 25 milliGRAM(s) Oral every 6 hours PRN Severe Pain (7 - 10)         HPI:  65F hx of HTN, HLD, T2DM off meds since 2018 and has not followed up with PMD since then presenting with R sided numbness. Pt related she has not felt well which started about 4 days ago and felt her balance was off and occasional dizziness. Today after waking up, she took a shower at 11AM and at the time noticed exquisite painful sensitivity in the R lower extremity and throughout the day noted numbness in the RLE and RUE and R side of face and even on the R torso. She went to get a pedicure at around 2 PM and noticed again exquisite painful sensitivity in the R foot when placed in water. She did not have slurring of speech, visual issues, dysphagia, or focal weakness. She went to see PMD and advised ED visit to r/o CVA. In ED, out of window for tPA. Afebrile BP: 187/104 P: 74 sat 100% on RA. CT head and CT angio neg for acute infarct but with focal stenosis in bl M2 MCA and bl PCA. Blood glucose was 511. S/p IV Labetalol 20mg in ED and 6U regular insulin. Pt feels well otherwise - wanted to sign out AMA because she didn't want to be moved upstairs where her mother had passed.     CTH unremarkable. 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 CTA revealed multiple B/L severe focal stenoses prox M2 branch and both MCAs B/L in PCA. As per neuro, continue high dose statin, ASA and Plavix for three weeks (started 4/14/21) then just ASA monotherapy. Cardio rec LON, done 4/21/21 which showed no cardiac source of embolism.    Patient was medically stable for discharge to [facility] for acute rehab 4/21/21.   (21 Apr 2021 15:46)      PAST MEDICAL & SURGICAL HISTORY:  HTN (hypertension)    HLD (hyperlipidemia)    Diabetes mellitus    History of ankle surgery        Subjective:  Feeling better. Neuropathic pain is much improved and headache is resolved. She slept well overnight.      Vital Signs Last 24 Hrs  T(C): 36.6 (29 Apr 2021 07:33), Max: 36.6 (28 Apr 2021 19:36)  T(F): 97.8 (29 Apr 2021 07:33), Max: 97.9 (28 Apr 2021 19:36)  HR: 73 (29 Apr 2021 07:33) (73 - 98)  BP: 145/78 (29 Apr 2021 07:33) (138/82 - 156/92)  BP(mean): --  RR: 15 (29 Apr 2021 07:33) (15 - 16)  SpO2: 99% (29 Apr 2021 07:33) (97% - 99%)      REVIEW OF SYMPTOMS  Neuro deficit--   Neuropathic pain    Physical Exam:  Gen - NAD, Comfortable  HEENT - NCAT, EOMI, MMM, no saccades  Neck - Supple, No limited ROM  Pulm - CTAB, No wheeze, No rhonchi, No crackles  Cardiovascular - RRR, S1S2, No m/r/g  Abdomen - Soft, NT/ND, +BS  Extremities - No C/C/E, No calf tenderness  Neuro-     Cognitive - AAOx3     Communication - Fluent, mild dysarthria     Attention: Intact         Memory: Recall --mild deficit     Cranial Nerves - PERRLA, EOMI, mild left facial droop, sensory impairment, dysarthria,     vestibulo-ocular reflex-- +dizziness with head rotation in horizontal or vertical motion when focusing on stationary object     +mild impairment with saccadic eye motion       Motor -                    LEFT    UE - ShAB 5/5, EF 5/5, EE 5/5, WE 5/5,  5/5                    RIGHT UE - ShAB 5/5, EF 5/5, EE 5/5, WE 5/5,  5/5                    LEFT    LE - HF 5/5, KE 5/5, DF 5/5, PF 5/5                    RIGHT LE - HF 5/5, KE 5/5, DF 5/5, PF 5/5        Sensory - increased sensitivity to right upper and lower  extremity and right low back--Improving     Coordination - FTN intact     Tone - normal      Psychiatric - Mood stable, Affect WNL    RECENT LABS      04-29    139  |  103  |  22  ----------------------------<  125<H>  4.2   |  25  |  0.64    Ca    9.6      29 Apr 2021 05:30    TPro  7.5  /  Alb  3.0<L>  /  TBili  0.3  /  DBili  x   /  AST  89<H>  /  ALT  83<H>  /  AlkPhos  79  04-29                  RADIOLOGY/OTHER RESULTS      MEDICATIONS  (STANDING):  amLODIPine   Tablet 10 milliGRAM(s) Oral daily  AQUAPHOR (petrolatum Ointment) 1 Application(s) Topical two times a day  aspirin enteric coated 81 milliGRAM(s) Oral daily  atorvastatin 80 milliGRAM(s) Oral at bedtime  clopidogrel Tablet 75 milliGRAM(s) Oral daily  dextrose 40% Gel 15 Gram(s) Oral once  dextrose 5%. 1000 milliLiter(s) (50 mL/Hr) IV Continuous <Continuous>  dextrose 5%. 1000 milliLiter(s) (100 mL/Hr) IV Continuous <Continuous>  dextrose 50% Injectable 12.5 Gram(s) IV Push once  dextrose 50% Injectable 25 Gram(s) IV Push once  dextrose 50% Injectable 25 Gram(s) IV Push once  enoxaparin Injectable 40 milliGRAM(s) SubCutaneous daily  gabapentin 300 milliGRAM(s) Oral <User Schedule>  gabapentin 600 milliGRAM(s) Oral <User Schedule>  gabapentin 600 milliGRAM(s) Oral at bedtime  glucagon  Injectable 1 milliGRAM(s) IntraMuscular once  hydrochlorothiazide 12.5 milliGRAM(s) Oral daily  insulin glargine Injectable (LANTUS) 30 Unit(s) SubCutaneous at bedtime  insulin lispro (ADMELOG) corrective regimen sliding scale   SubCutaneous three times a day before meals  insulin lispro (ADMELOG) corrective regimen sliding scale   SubCutaneous at bedtime  insulin lispro Injectable (ADMELOG) 10 Unit(s) SubCutaneous three times a day before meals  lidocaine   Patch 1 Patch Transdermal every 24 hours  lisinopril 20 milliGRAM(s) Oral two times a day  magnesium oxide 400 milliGRAM(s) Oral with breakfast  metFORMIN 500 milliGRAM(s) Oral <User Schedule>  omega-3-Acid Ethyl Esters 2 Gram(s) Oral two times a day  pantoprazole    Tablet 40 milliGRAM(s) Oral before breakfast    MEDICATIONS  (PRN):  acetaminophen   Tablet .. 650 milliGRAM(s) Oral every 6 hours PRN Temp greater or equal to 38C (100.4F), Mild Pain (1 - 3)  ondansetron    Tablet 4 milliGRAM(s) Oral every 6 hours PRN Nausea and/or Vomiting  senna 2 Tablet(s) Oral at bedtime PRN Constipation  traMADol 25 milliGRAM(s) Oral every 6 hours PRN Severe Pain (7 - 10)

## 2021-04-30 ENCOUNTER — TRANSCRIPTION ENCOUNTER (OUTPATIENT)
Age: 66
End: 2021-04-30

## 2021-04-30 LAB
BASOPHILS # BLD AUTO: 0.03 K/UL — SIGNIFICANT CHANGE UP (ref 0–0.2)
BASOPHILS NFR BLD AUTO: 0.5 % — SIGNIFICANT CHANGE UP (ref 0–2)
EOSINOPHIL # BLD AUTO: 0.22 K/UL — SIGNIFICANT CHANGE UP (ref 0–0.5)
EOSINOPHIL NFR BLD AUTO: 3.7 % — SIGNIFICANT CHANGE UP (ref 0–6)
GLUCOSE BLDC GLUCOMTR-MCNC: 114 MG/DL — HIGH (ref 70–99)
GLUCOSE BLDC GLUCOMTR-MCNC: 134 MG/DL — HIGH (ref 70–99)
GLUCOSE BLDC GLUCOMTR-MCNC: 188 MG/DL — HIGH (ref 70–99)
GLUCOSE BLDC GLUCOMTR-MCNC: 84 MG/DL — SIGNIFICANT CHANGE UP (ref 70–99)
HCT VFR BLD CALC: 40.9 % — SIGNIFICANT CHANGE UP (ref 34.5–45)
HGB BLD-MCNC: 13 G/DL — SIGNIFICANT CHANGE UP (ref 11.5–15.5)
IMM GRANULOCYTES NFR BLD AUTO: 0.2 % — SIGNIFICANT CHANGE UP (ref 0–1.5)
LYMPHOCYTES # BLD AUTO: 2.43 K/UL — SIGNIFICANT CHANGE UP (ref 1–3.3)
LYMPHOCYTES # BLD AUTO: 40.9 % — SIGNIFICANT CHANGE UP (ref 13–44)
MCHC RBC-ENTMCNC: 27.5 PG — SIGNIFICANT CHANGE UP (ref 27–34)
MCHC RBC-ENTMCNC: 31.8 GM/DL — LOW (ref 32–36)
MCV RBC AUTO: 86.7 FL — SIGNIFICANT CHANGE UP (ref 80–100)
MONOCYTES # BLD AUTO: 0.53 K/UL — SIGNIFICANT CHANGE UP (ref 0–0.9)
MONOCYTES NFR BLD AUTO: 8.9 % — SIGNIFICANT CHANGE UP (ref 2–14)
NEUTROPHILS # BLD AUTO: 2.72 K/UL — SIGNIFICANT CHANGE UP (ref 1.8–7.4)
NEUTROPHILS NFR BLD AUTO: 45.8 % — SIGNIFICANT CHANGE UP (ref 43–77)
NRBC # BLD: 0 /100 WBCS — SIGNIFICANT CHANGE UP (ref 0–0)
PLATELET # BLD AUTO: 208 K/UL — SIGNIFICANT CHANGE UP (ref 150–400)
RBC # BLD: 4.72 M/UL — SIGNIFICANT CHANGE UP (ref 3.8–5.2)
RBC # FLD: 13 % — SIGNIFICANT CHANGE UP (ref 10.3–14.5)
WBC # BLD: 5.94 K/UL — SIGNIFICANT CHANGE UP (ref 3.8–10.5)
WBC # FLD AUTO: 5.94 K/UL — SIGNIFICANT CHANGE UP (ref 3.8–10.5)

## 2021-04-30 PROCEDURE — 99232 SBSQ HOSP IP/OBS MODERATE 35: CPT

## 2021-04-30 PROCEDURE — 99233 SBSQ HOSP IP/OBS HIGH 50: CPT

## 2021-04-30 RX ORDER — ASPIRIN/CALCIUM CARB/MAGNESIUM 324 MG
1 TABLET ORAL
Qty: 30 | Refills: 0
Start: 2021-04-30 | End: 2021-05-29

## 2021-04-30 RX ORDER — CLOPIDOGREL BISULFATE 75 MG/1
1 TABLET, FILM COATED ORAL
Qty: 30 | Refills: 0
Start: 2021-04-30 | End: 2021-05-29

## 2021-04-30 RX ORDER — LISINOPRIL 2.5 MG/1
1 TABLET ORAL
Qty: 60 | Refills: 0
Start: 2021-04-30 | End: 2021-05-29

## 2021-04-30 RX ORDER — SENNA PLUS 8.6 MG/1
2 TABLET ORAL
Qty: 0 | Refills: 0 | DISCHARGE
Start: 2021-04-30

## 2021-04-30 RX ORDER — PANTOPRAZOLE SODIUM 20 MG/1
1 TABLET, DELAYED RELEASE ORAL
Qty: 0 | Refills: 0 | DISCHARGE
Start: 2021-04-30

## 2021-04-30 RX ORDER — GABAPENTIN 400 MG/1
1 CAPSULE ORAL
Qty: 30 | Refills: 0
Start: 2021-04-30 | End: 2021-05-29

## 2021-04-30 RX ORDER — ATORVASTATIN CALCIUM 80 MG/1
1 TABLET, FILM COATED ORAL
Qty: 30 | Refills: 0
Start: 2021-04-30 | End: 2021-05-29

## 2021-04-30 RX ORDER — AMLODIPINE BESYLATE 2.5 MG/1
1 TABLET ORAL
Qty: 30 | Refills: 0
Start: 2021-04-30 | End: 2021-05-29

## 2021-04-30 RX ORDER — GABAPENTIN 400 MG/1
2 CAPSULE ORAL
Qty: 120 | Refills: 0
Start: 2021-04-30 | End: 2021-05-29

## 2021-04-30 RX ORDER — MAGNESIUM OXIDE 400 MG ORAL TABLET 241.3 MG
1 TABLET ORAL
Qty: 30 | Refills: 0
Start: 2021-04-30 | End: 2021-05-29

## 2021-04-30 RX ORDER — CLOPIDOGREL BISULFATE 75 MG/1
1 TABLET, FILM COATED ORAL
Qty: 5 | Refills: 0
Start: 2021-04-30 | End: 2021-05-04

## 2021-04-30 RX ADMIN — ENOXAPARIN SODIUM 40 MILLIGRAM(S): 100 INJECTION SUBCUTANEOUS at 12:02

## 2021-04-30 RX ADMIN — Medication 2: at 12:03

## 2021-04-30 RX ADMIN — Medication 1 APPLICATION(S): at 16:57

## 2021-04-30 RX ADMIN — LISINOPRIL 20 MILLIGRAM(S): 2.5 TABLET ORAL at 05:10

## 2021-04-30 RX ADMIN — PANTOPRAZOLE SODIUM 40 MILLIGRAM(S): 20 TABLET, DELAYED RELEASE ORAL at 05:10

## 2021-04-30 RX ADMIN — Medication 2 GRAM(S): at 18:14

## 2021-04-30 RX ADMIN — Medication 2 GRAM(S): at 05:10

## 2021-04-30 RX ADMIN — Medication 81 MILLIGRAM(S): at 12:02

## 2021-04-30 RX ADMIN — Medication 1 APPLICATION(S): at 05:11

## 2021-04-30 RX ADMIN — Medication 10 UNIT(S): at 16:55

## 2021-04-30 RX ADMIN — MAGNESIUM OXIDE 400 MG ORAL TABLET 400 MILLIGRAM(S): 241.3 TABLET ORAL at 07:45

## 2021-04-30 RX ADMIN — LIDOCAINE 1 PATCH: 4 CREAM TOPICAL at 12:02

## 2021-04-30 RX ADMIN — ATORVASTATIN CALCIUM 80 MILLIGRAM(S): 80 TABLET, FILM COATED ORAL at 21:16

## 2021-04-30 RX ADMIN — Medication 12.5 MILLIGRAM(S): at 05:10

## 2021-04-30 RX ADMIN — LIDOCAINE 1 PATCH: 4 CREAM TOPICAL at 19:05

## 2021-04-30 RX ADMIN — AMLODIPINE BESYLATE 10 MILLIGRAM(S): 2.5 TABLET ORAL at 05:10

## 2021-04-30 RX ADMIN — GABAPENTIN 300 MILLIGRAM(S): 400 CAPSULE ORAL at 05:10

## 2021-04-30 RX ADMIN — GABAPENTIN 600 MILLIGRAM(S): 400 CAPSULE ORAL at 21:16

## 2021-04-30 RX ADMIN — LISINOPRIL 20 MILLIGRAM(S): 2.5 TABLET ORAL at 18:14

## 2021-04-30 RX ADMIN — CLOPIDOGREL BISULFATE 75 MILLIGRAM(S): 75 TABLET, FILM COATED ORAL at 12:02

## 2021-04-30 RX ADMIN — Medication 10 UNIT(S): at 12:02

## 2021-04-30 RX ADMIN — INSULIN GLARGINE 30 UNIT(S): 100 INJECTION, SOLUTION SUBCUTANEOUS at 21:16

## 2021-04-30 RX ADMIN — Medication 10 UNIT(S): at 08:19

## 2021-04-30 RX ADMIN — METFORMIN HYDROCHLORIDE 500 MILLIGRAM(S): 850 TABLET ORAL at 16:55

## 2021-04-30 RX ADMIN — GABAPENTIN 600 MILLIGRAM(S): 400 CAPSULE ORAL at 12:02

## 2021-04-30 NOTE — PROGRESS NOTE ADULT - SUBJECTIVE AND OBJECTIVE BOX
HPI:  65F hx of HTN, HLD, T2DM off meds since 2018 and has not followed up with PMD since then presenting with R sided numbness. Pt related she has not felt well which started about 4 days ago and felt her balance was off and occasional dizziness. Today after waking up, she took a shower at 11AM and at the time noticed exquisite painful sensitivity in the R lower extremity and throughout the day noted numbness in the RLE and RUE and R side of face and even on the R torso. She went to get a pedicure at around 2 PM and noticed again exquisite painful sensitivity in the R foot when placed in water. She did not have slurring of speech, visual issues, dysphagia, or focal weakness. She went to see PMD and advised ED visit to r/o CVA. In ED, out of window for tPA. Afebrile BP: 187/104 P: 74 sat 100% on RA. CT head and CT angio neg for acute infarct but with focal stenosis in bl M2 MCA and bl PCA. Blood glucose was 511. S/p IV Labetalol 20mg in ED and 6U regular insulin. Pt feels well otherwise - wanted to sign out AMA because she didn't want to be moved upstairs where her mother had passed.     CTH unremarkable. 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 CTA revealed multiple B/L severe focal stenoses prox M2 branch and both MCAs B/L in PCA. As per neuro, continue high dose statin, ASA and Plavix for three weeks (started 4/14/21) then just ASA monotherapy. Cardio rec LON, done 4/21/21 which showed no cardiac source of embolism.    Patient was medically stable for discharge to [place] for acute rehab 4/21/21.   (21 Apr 2021 15:46)      PAST MEDICAL & SURGICAL HISTORY:  HTN (hypertension)    HLD (hyperlipidemia)    Diabetes mellitus    History of ankle surgery        Subjective: No overnight events. Neuropathic pain is much improved and headache is resolved. She slept well overnight. She is ready for discharge tomorrow.      Vital Signs Last 24 Hrs  T(C): 36.7 (30 Apr 2021 08:48), Max: 36.7 (29 Apr 2021 22:39)  T(F): 98 (30 Apr 2021 08:48), Max: 98.1 (29 Apr 2021 22:39)  HR: 89 (30 Apr 2021 08:48) (85 - 89)  BP: 136/76 (30 Apr 2021 08:48) (121/75 - 151/84)  BP(mean): --  RR: 16 (30 Apr 2021 08:48) (16 - 16)  SpO2: 98% (30 Apr 2021 08:48) (98% - 99%)      REVIEW OF SYMPTOMS  Neuro deficit--   Neuropathic pain    Physical Exam:  Gen - NAD, Comfortable  HEENT - NCAT, EOMI, MMM, no saccades  Neck - Supple, No limited ROM  Pulm - CTAB, No wheeze, No rhonchi, No crackles  Cardiovascular - RRR, S1S2, No m/r/g  Abdomen - Soft, NT/ND, +BS  Extremities - No C/C/E, No calf tenderness  Neuro-     Cognitive - AAOx3     Communication - Fluent, mild dysarthria     Attention: Intact         Memory: Recall --mild deficit     Cranial Nerves - PERRLA, EOMI, mild left facial droop, sensory impairment, dysarthria,     vestibulo-ocular reflex-- +dizziness with head rotation in horizontal or vertical motion when focusing on stationary object     +mild impairment with saccadic eye motion       Motor -                    LEFT    UE - ShAB 5/5, EF 5/5, EE 5/5, WE 5/5,  5/5                    RIGHT UE - ShAB 5/5, EF 5/5, EE 5/5, WE 5/5,  5/5                    LEFT    LE - HF 5/5, KE 5/5, DF 5/5, PF 5/5                    RIGHT LE - HF 5/5, KE 5/5, DF 5/5, PF 5/5        Sensory - increased sensitivity to right upper and lower  extremity and right low back--Improving     Coordination - FTN intact     Tone - normal      Psychiatric - Mood stable, Affect WNL    RECENT LABS      04-29    139  |  103  |  22  ----------------------------<  125<H>  4.2   |  25  |  0.64    Ca    9.6      29 Apr 2021 05:30    TPro  7.5  /  Alb  3.0<L>  /  TBili  0.3  /  DBili  x   /  AST  89<H>  /  ALT  83<H>  /  AlkPhos  79  04-29                  RADIOLOGY/OTHER RESULTS      MEDICATIONS  (STANDING):  amLODIPine   Tablet 10 milliGRAM(s) Oral daily  AQUAPHOR (petrolatum Ointment) 1 Application(s) Topical two times a day  aspirin enteric coated 81 milliGRAM(s) Oral daily  atorvastatin 80 milliGRAM(s) Oral at bedtime  clopidogrel Tablet 75 milliGRAM(s) Oral daily  dextrose 40% Gel 15 Gram(s) Oral once  dextrose 5%. 1000 milliLiter(s) (50 mL/Hr) IV Continuous <Continuous>  dextrose 5%. 1000 milliLiter(s) (100 mL/Hr) IV Continuous <Continuous>  dextrose 50% Injectable 25 Gram(s) IV Push once  dextrose 50% Injectable 12.5 Gram(s) IV Push once  dextrose 50% Injectable 25 Gram(s) IV Push once  enoxaparin Injectable 40 milliGRAM(s) SubCutaneous daily  gabapentin 600 milliGRAM(s) Oral at bedtime  gabapentin 300 milliGRAM(s) Oral <User Schedule>  gabapentin 600 milliGRAM(s) Oral <User Schedule>  glucagon  Injectable 1 milliGRAM(s) IntraMuscular once  hydrochlorothiazide 12.5 milliGRAM(s) Oral daily  insulin glargine Injectable (LANTUS) 30 Unit(s) SubCutaneous at bedtime  insulin lispro (ADMELOG) corrective regimen sliding scale   SubCutaneous three times a day before meals  insulin lispro (ADMELOG) corrective regimen sliding scale   SubCutaneous at bedtime  insulin lispro Injectable (ADMELOG) 10 Unit(s) SubCutaneous three times a day before meals  lidocaine   Patch 1 Patch Transdermal every 24 hours  lisinopril 20 milliGRAM(s) Oral two times a day  magnesium oxide 400 milliGRAM(s) Oral with breakfast  metFORMIN 500 milliGRAM(s) Oral <User Schedule>  omega-3-Acid Ethyl Esters 2 Gram(s) Oral two times a day  pantoprazole    Tablet 40 milliGRAM(s) Oral before breakfast    MEDICATIONS  (PRN):  acetaminophen   Tablet .. 650 milliGRAM(s) Oral every 6 hours PRN Temp greater or equal to 38C (100.4F), Mild Pain (1 - 3)  ondansetron    Tablet 4 milliGRAM(s) Oral every 6 hours PRN Nausea and/or Vomiting  senna 2 Tablet(s) Oral at bedtime PRN Constipation  traMADol 25 milliGRAM(s) Oral every 6 hours PRN Severe Pain (7 - 10)     HPI:  65F hx of HTN, HLD, T2DM off meds since 2018 and has not followed up with PMD since then presenting with R sided numbness. Pt related she has not felt well which started about 4 days ago and felt her balance was off and occasional dizziness. Today after waking up, she took a shower at 11AM and at the time noticed exquisite painful sensitivity in the R lower extremity and throughout the day noted numbness in the RLE and RUE and R side of face and even on the R torso. She went to get a pedicure at around 2 PM and noticed again exquisite painful sensitivity in the R foot when placed in water. She did not have slurring of speech, visual issues, dysphagia, or focal weakness. She went to see PMD and advised ED visit to r/o CVA. In ED, out of window for tPA. Afebrile BP: 187/104 P: 74 sat 100% on RA. CT head and CT angio neg for acute infarct but with focal stenosis in bl M2 MCA and bl PCA. Blood glucose was 511. S/p IV Labetalol 20mg in ED and 6U regular insulin. Pt feels well otherwise - wanted to sign out AMA because she didn't want to be moved upstairs where her mother had passed.     CTH unremarkable. 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 CTA revealed multiple B/L severe focal stenoses prox M2 branch and both MCAs B/L in PCA. As per neuro, continue high dose statin, ASA and Plavix for three weeks (started 4/14/21) then just ASA monotherapy. Cardio rec LON, done 4/21/21 which showed no cardiac source of embolism.    Patient was medically stable for discharge to [facility] for acute rehab 4/21/21.   (21 Apr 2021 15:46)      PAST MEDICAL & SURGICAL HISTORY:  HTN (hypertension)    HLD (hyperlipidemia)    Diabetes mellitus    History of ankle surgery        Subjective: No overnight events. Neuropathic pain is much improved and headache is resolved. She slept well overnight. She is ready for discharge tomorrow.      Vital Signs Last 24 Hrs  T(C): 36.7 (30 Apr 2021 08:48), Max: 36.7 (29 Apr 2021 22:39)  T(F): 98 (30 Apr 2021 08:48), Max: 98.1 (29 Apr 2021 22:39)  HR: 89 (30 Apr 2021 08:48) (85 - 89)  BP: 136/76 (30 Apr 2021 08:48) (121/75 - 151/84)  BP(mean): --  RR: 16 (30 Apr 2021 08:48) (16 - 16)  SpO2: 98% (30 Apr 2021 08:48) (98% - 99%)      REVIEW OF SYMPTOMS  Neuro deficit--   Neuropathic pain--controlled    Physical Exam:  Gen - NAD, Comfortable  HEENT - NCAT, EOMI, MMM, no saccades  Neck - Supple, No limited ROM  Pulm - CTAB, No wheeze, No rhonchi, No crackles  Cardiovascular - RRR, S1S2, No m/r/g  Abdomen - Soft, NT/ND, +BS  Extremities - No C/C/E, No calf tenderness  Neuro-     Cognitive - AAOx3     Communication - Fluent, mild dysarthria     Attention: Intact         Memory: Recall --mild deficit     Cranial Nerves - PERRLA, EOMI, mild left facial droop, sensory impairment, dysarthria,     vestibulo-ocular reflex-- +dizziness with head rotation in horizontal or vertical motion when focusing on stationary object     +mild impairment with saccadic eye motion       Motor -                    LEFT    UE - ShAB 5/5, EF 5/5, EE 5/5, WE 5/5,  5/5                    RIGHT UE - ShAB 5/5, EF 5/5, EE 5/5, WE 5/5,  5/5                    LEFT    LE - HF 5/5, KE 5/5, DF 5/5, PF 5/5                    RIGHT LE - HF 5/5, KE 5/5, DF 5/5, PF 5/5        Sensory - increased sensitivity to right upper and lower  extremity and right low back--Improving     Coordination - FTN intact     Tone - normal      Psychiatric - Mood stable, Affect WNL    RECENT LABS      04-29    139  |  103  |  22  ----------------------------<  125<H>  4.2   |  25  |  0.64    Ca    9.6      29 Apr 2021 05:30    TPro  7.5  /  Alb  3.0<L>  /  TBili  0.3  /  DBili  x   /  AST  89<H>  /  ALT  83<H>  /  AlkPhos  79  04-29                  RADIOLOGY/OTHER RESULTS      MEDICATIONS  (STANDING):  amLODIPine   Tablet 10 milliGRAM(s) Oral daily  AQUAPHOR (petrolatum Ointment) 1 Application(s) Topical two times a day  aspirin enteric coated 81 milliGRAM(s) Oral daily  atorvastatin 80 milliGRAM(s) Oral at bedtime  clopidogrel Tablet 75 milliGRAM(s) Oral daily  dextrose 40% Gel 15 Gram(s) Oral once  dextrose 5%. 1000 milliLiter(s) (50 mL/Hr) IV Continuous <Continuous>  dextrose 5%. 1000 milliLiter(s) (100 mL/Hr) IV Continuous <Continuous>  dextrose 50% Injectable 25 Gram(s) IV Push once  dextrose 50% Injectable 12.5 Gram(s) IV Push once  dextrose 50% Injectable 25 Gram(s) IV Push once  enoxaparin Injectable 40 milliGRAM(s) SubCutaneous daily  gabapentin 600 milliGRAM(s) Oral at bedtime  gabapentin 300 milliGRAM(s) Oral <User Schedule>  gabapentin 600 milliGRAM(s) Oral <User Schedule>  glucagon  Injectable 1 milliGRAM(s) IntraMuscular once  hydrochlorothiazide 12.5 milliGRAM(s) Oral daily  insulin glargine Injectable (LANTUS) 30 Unit(s) SubCutaneous at bedtime  insulin lispro (ADMELOG) corrective regimen sliding scale   SubCutaneous three times a day before meals  insulin lispro (ADMELOG) corrective regimen sliding scale   SubCutaneous at bedtime  insulin lispro Injectable (ADMELOG) 10 Unit(s) SubCutaneous three times a day before meals  lidocaine   Patch 1 Patch Transdermal every 24 hours  lisinopril 20 milliGRAM(s) Oral two times a day  magnesium oxide 400 milliGRAM(s) Oral with breakfast  metFORMIN 500 milliGRAM(s) Oral <User Schedule>  omega-3-Acid Ethyl Esters 2 Gram(s) Oral two times a day  pantoprazole    Tablet 40 milliGRAM(s) Oral before breakfast    MEDICATIONS  (PRN):  acetaminophen   Tablet .. 650 milliGRAM(s) Oral every 6 hours PRN Temp greater or equal to 38C (100.4F), Mild Pain (1 - 3)  ondansetron    Tablet 4 milliGRAM(s) Oral every 6 hours PRN Nausea and/or Vomiting  senna 2 Tablet(s) Oral at bedtime PRN Constipation  traMADol 25 milliGRAM(s) Oral every 6 hours PRN Severe Pain (7 - 10)

## 2021-04-30 NOTE — PROGRESS NOTE ADULT - ASSESSMENT
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    #L Lateral Medullary CVA  -comprehensive rehab program -PT/OT/SLP per rehab team  -Pain management, bowel regimen per rehab   -ASA and Plavix 3 weeks followed by ASA monotherapy, last dose of Plavix will be 5/5/21  -Lipitor 80mg QHS, lovaza  -monitor LFTs  -LON 4/21/21: no cardiac source of embolism  -F/u neuro outpatient, Dr. Owen    #HTN  - improving  - lisinopril 20mg BID  - norvasc increased 5mg to 10mg   - start low dose hydrochlorothiazide (4/28), with repeat chem7 to check electrolytes in a week.     # Headaches  doubt related to hypertension with systolic 150s-160s.  - repeat CT head wnl on 4/26    #T2DM, uncontrolled - HbA1c 13.9  - Tentative discharge home on: Lantus dec to 30un qhs + Admelog 10U TID AC. Pt may need to supplement admelog with a sliding scale. Will discuss further with diabetic nurse and see if she can meet with pt today.  - metformin 500 mg w/ dinner. Slow titration up due to GI intolerability in the past. Increase by 500 mg q week by PCP or Endo.   -FS and ISS  -Gabapentin per rehab  - contact social work to make sure pt has an apt with Tanisha Carlson with family Medicine.    # Bilateral lower extremity hypopigmented macules   appears benign, likely related to sun exposure. Not infectious and pt is asymptomatic. Consistent with idiopathic guttate hypomelanosis.   - minimize sun exposure    DVT ppx - Lovenox, SCDs

## 2021-04-30 NOTE — DISCHARGE NOTE NURSING/CASE MANAGEMENT/SOCIAL WORK - PATIENT PORTAL LINK FT
You can access the FollowMyHealth Patient Portal offered by [facility] by registering at the following website: [email or web address]. By joining [facility]’s FollowMyHealth portal, you will also be able to view your health information using other applications (apps) compatible with our system.

## 2021-04-30 NOTE — ADVANCED PRACTICE NURSE CONSULT - ASSESSMENT
Reviewed discharge instructions for Lantus 30 units SC daily, Lispro 10 units SC before meals 3Xs/day, metformin 500 mg orally with dinner, Monitor BG AC and HS, keep BG log, reviewed s/s and tx of hypoglycemia per 15 rule. Reviewed contents of Living with diabetes book. Pt validated education via teach back.  Scheduled appointment for pt with out pt Diabetes educator- Emilie Keen for May 7th at 10 am.

## 2021-04-30 NOTE — PROGRESS NOTE ADULT - ASSESSMENT
Assessment/Plan:  RUFINO MARTINEZ is a 65 y.o. 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        Comprehensive Multidisciplinary Rehab Program:  - Contt comprehensive rehab program, PT/OT/SLP 3 hours a day, 5 days a week.  - Precautions: falls    #L Lateral Medullary CVA  - Continue ASA and Plavix. Last dose of Plavix 5/5/21  - Continue Lipitor 80mg QHS, Fish oil  - LON 4/21/21: no cardiac source of embolism  - F/u neuro outpatient, Dr. Owen  - Due to severe headache, ordered CTH stat 4/26. CTH stable. Likely vestibulo-ocular component.  --Trial vestibular exercises as tolerated in PT  --educated on risk factors for future strokes and how to address    #HTN  --BP controlled  --Continue Amlodipine 10mg daily  - Lisinopril 20mg BID  -SBP goal 110-140    #DM  - Lantus 30U QHS   -Admelog to 10U TID AC-  -Metformin   - FS and ISS  - RN to do insulin teaching  - Home regimen is Lantus 30U QHS and Admelog 10U TID AC. Metformin 500mg daily on discharge, to be titrated up slowly as outpatient due to GI side effects.  - Will f/u with Dr. Tanisha Carlson outpatient for DM/insulin management.       Pain Management:  - Tylenol PRN, Gabapentin, Tramadol (has not needed in past couple days),   --Lidocaine patch-  - Cont.  Gabapentin 300/600/600 mg--pain much improved  --Daily headache-- Mag Ox for H/A ppx.  - Avoid sedating medications that may interfere with cognitive recovery    GI/Bowel:  - Senna QHS, Miralax PRN Daily  - GI ppx: Protonix    DVT ppx:  - Lovenox  - SCDs    IDT 4/27/21:  - SW: lives in a private house with 10STE, 8steps with 1 HR inside. She lives with 2 daughters, independent prior with no DME.  - OT: supervision eating, UBD, LBD, bathing, toilet transfers, toileting. CG shower transfer  - PT: close supervision-CG transfers & ambulation 150ft.; CG 16 steps  - SLP: regular/thins, EMST, mild dysarthria. Decreased attention. Patient refused MBS.  - EDOD:  5/1/21 to home   Assessment/Plan:  RUFINO MARTINEZ is a 65 y.o. 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        Comprehensive Multidisciplinary Rehab Program:  - Contt comprehensive rehab program, PT/OT/SLP 3 hours a day, 5 days a week.  - Precautions: falls    #L Lateral Medullary CVA  - Continue ASA and Plavix. Last dose of Plavix 5/5/21  - Continue Lipitor 80mg QHS, Fish oil  - LON 4/21/21: no cardiac source of embolism  - F/u neuro outpatient, Dr. Owen  - Due to severe headache, ordered CTH stat 4/26. CTH stable. Likely vestibulo-ocular component.  --Trial vestibular exercises as tolerated in PT  --educated on risk factors for future strokes and how to address    #HTN  --BP controlled  --Continue Amlodipine 10mg daily  - Lisinopril 20mg BID  -SBP goal 110-140    #DM  - Lantus 30U QHS   -Admelog to 10U TID AC-  -Metformin   - FS and ISS  - RN to do insulin teaching  -Cont.  Home regimen is Lantus 30U QHS and Admelog 10U TID AC. Metformin 500mg daily on discharge, to be titrated up slowly as outpatient due to GI side effects.  - Will f/u with Dr. Tanisha Carlson outpatient for DM/insulin management.       Pain Management:  - Tylenol PRN, Gabapentin, Tramadol (has not needed in past couple days),   --Lidocaine patch-  - Cont.  Gabapentin 300/600/600 mg--pain  improved--continue upon discharge  --Daily headache-- Mag Ox for H/A ppx.  - Avoid sedating medications that may interfere with cognitive recovery    GI/Bowel:  - Senna QHS, Miralax PRN Daily  - GI ppx: Protonix    DVT ppx:  - Lovenox--d/c on discharge  - SCDs    IDT 4/27/21:  - SW: lives in a private house with 10STE, 8steps with 1 HR inside. She lives with 2 daughters, independent prior with no DME.  - OT: supervision eating, UBD, LBD, bathing, toilet transfers, toileting. CG shower transfer  - PT: close supervision-CG transfers & ambulation 150ft.; CG 16 steps  - SLP: regular/thins, EMST, mild dysarthria. Decreased attention. Patient refused MBS.  - EDOD:  5/1/21 to home

## 2021-04-30 NOTE — PROGRESS NOTE ADULT - SUBJECTIVE AND OBJECTIVE BOX
Patient is a 65y old  Female who presents with a chief complaint of L lateral medullary CVA  1.4 (29 Apr 2021 15:19)      24 HOUR EVENTS:  No overnight events reported.     SUBJECTIVE:  Patient seen and examined at bedside.     ALLERGIES:  No Known Allergies    MEDICATIONS  (STANDING):  amLODIPine   Tablet 10 milliGRAM(s) Oral daily  AQUAPHOR (petrolatum Ointment) 1 Application(s) Topical two times a day  aspirin enteric coated 81 milliGRAM(s) Oral daily  atorvastatin 80 milliGRAM(s) Oral at bedtime  clopidogrel Tablet 75 milliGRAM(s) Oral daily  dextrose 40% Gel 15 Gram(s) Oral once  dextrose 5%. 1000 milliLiter(s) (50 mL/Hr) IV Continuous <Continuous>  dextrose 5%. 1000 milliLiter(s) (100 mL/Hr) IV Continuous <Continuous>  dextrose 50% Injectable 25 Gram(s) IV Push once  dextrose 50% Injectable 12.5 Gram(s) IV Push once  dextrose 50% Injectable 25 Gram(s) IV Push once  enoxaparin Injectable 40 milliGRAM(s) SubCutaneous daily  gabapentin 600 milliGRAM(s) Oral at bedtime  gabapentin 300 milliGRAM(s) Oral <User Schedule>  gabapentin 600 milliGRAM(s) Oral <User Schedule>  glucagon  Injectable 1 milliGRAM(s) IntraMuscular once  hydrochlorothiazide 12.5 milliGRAM(s) Oral daily  insulin glargine Injectable (LANTUS) 30 Unit(s) SubCutaneous at bedtime  insulin lispro (ADMELOG) corrective regimen sliding scale   SubCutaneous three times a day before meals  insulin lispro (ADMELOG) corrective regimen sliding scale   SubCutaneous at bedtime  insulin lispro Injectable (ADMELOG) 10 Unit(s) SubCutaneous three times a day before meals  lidocaine   Patch 1 Patch Transdermal every 24 hours  lisinopril 20 milliGRAM(s) Oral two times a day  magnesium oxide 400 milliGRAM(s) Oral with breakfast  metFORMIN 500 milliGRAM(s) Oral <User Schedule>  omega-3-Acid Ethyl Esters 2 Gram(s) Oral two times a day  pantoprazole    Tablet 40 milliGRAM(s) Oral before breakfast    MEDICATIONS  (PRN):  acetaminophen   Tablet .. 650 milliGRAM(s) Oral every 6 hours PRN Temp greater or equal to 38C (100.4F), Mild Pain (1 - 3)  ondansetron    Tablet 4 milliGRAM(s) Oral every 6 hours PRN Nausea and/or Vomiting  senna 2 Tablet(s) Oral at bedtime PRN Constipation  traMADol 25 milliGRAM(s) Oral every 6 hours PRN Severe Pain (7 - 10)    Vital Signs Last 24 Hrs  T(F): 98 (30 Apr 2021 05:07), Max: 98.1 (29 Apr 2021 22:39)  HR: 87 (30 Apr 2021 05:07) (85 - 87)  BP: 121/75 (30 Apr 2021 05:07) (121/75 - 151/84)  RR: 16 (30 Apr 2021 05:07) (16 - 16)  SpO2: 99% (30 Apr 2021 05:07) (99% - 99%)  I&O's Summary    29 Apr 2021 07:01  -  30 Apr 2021 07:00  --------------------------------------------------------  IN: 240 mL / OUT: 0 mL / NET: 240 mL      PHYSICAL EXAM:  General: NAD, A/O x 3  ENT: Moist mucous membranes, no thrush  Neck: Supple, No JVD  Lungs: Clear to auscultation bilaterally, good air entry, non-labored breathing  Cardio: RRR, S1/S2, No murmur  Abdomen: Soft, Nontender, Nondistended; Bowel sounds present  Extremities: No calf tenderness, No pitting edema    LABS:                        13.0   5.94  )-----------( 208      ( 30 Apr 2021 06:25 )             40.9     04-29    139  |  103  |  22  ----------------------------<  125  4.2   |  25  |  0.64    Ca    9.6      29 Apr 2021 05:30    TPro  7.5  /  Alb  3.0  /  TBili  0.3  /  DBili  x   /  AST  89  /  ALT  83  /  AlkPhos  79  04-29        eGFR if African American: 110 mL/min/1.73M2 (04-29-21 @ 05:30)  eGFR if Non African American: 95 mL/min/1.73M2 (04-29-21 @ 05:30)        04-14 Chol 206 mg/dL LDL -- HDL 37 mg/dL Trig 195 mg/dL      POCT Blood Glucose.: 134 mg/dL (30 Apr 2021 07:44)  POCT Blood Glucose.: 143 mg/dL (29 Apr 2021 22:20)  POCT Blood Glucose.: 94 mg/dL (29 Apr 2021 16:44)  POCT Blood Glucose.: 171 mg/dL (29 Apr 2021 11:04)          RADIOLOGY & ADDITIONAL TESTS:    Care Discussed with Consultants/Other Providers:

## 2021-04-30 NOTE — PROGRESS NOTE ADULT - ATTENDING COMMENTS
Pt. seen with resident.  Agree with documentation above as per resident with amendments made as appropriate. Patient medically stable. Making progress towards rehab goals.     Left lateral medullary infarct--   HTN -- BP elevated-- added Norvasc.  -SBP goal 110-140    Neuropathic pain-- increase gabapentin  Will help improve headache pain as well.
Pt. seen with resident.  Agree with documentation above as per resident with amendments made as appropriate. Patient medically stable. Making progress towards rehab goals.     Left laterally medullary infarct  -Neuropathic pain controlled.  Insulin regimen for home discharge d/w hospitalist and DM educator  --d/c planning for home tomorrow.  will review discharge medications and instructions with pt. prior to discharge.
Pt. seen with resident.  Agree with documentation above as per resident with amendments made as appropriate. Patient medically stable. Making progress towards rehab goals.     Left lateral medullary infarct  Neuropathic pain improved.  No headaches.    doing well.  Rx's provided for outpatient therapy    Will need insulin at home-- d/w hospitalist-- will f/u regarding appropriate home dosing.  will send rx today
Pt. seen with resident.  Agree with documentation above as per resident with amendments made as appropriate. Patient medically stable. Making progress towards rehab goals.        L lateral medullary CVA with right sensory impairment/parasthesias, dysphagia, dysarthria.  labs and VS stable
Pt. seen with resident.  Agree with documentation above as per resident with amendments made as appropriate. Patient medically stable. Making progress towards rehab goals.     Pt. c/o severe headache pain this AM.  10/10-- improved to 7 with tylenol.  bilateral throbbing, pressure over sides and back of head.  No nausea/vomiting. Gets eyestrain-- ? triggers headache.          vestibulo-ocular reflex-- +dizziness with head rotation in horizontal or vertical motion when focusing on stationary object     +mild impairment with saccadic eye motion  No photophobia    STAT CT Head-- stable.   No new IC pathology.       d/w hospitalist to adjust medication regimen to improve HTN control as pt. consistently 150s-160s.  May be contributing to headaches.      Also adjust insulin regimen.      Pt. will benefit from Vestibular evaluation as vestibular ocular issues seem to be contributing to Headaches.  Will d/w PT    Pt. updated on results of imaging and rehab plan of care.

## 2021-05-01 VITALS
SYSTOLIC BLOOD PRESSURE: 127 MMHG | RESPIRATION RATE: 14 BRPM | OXYGEN SATURATION: 99 % | DIASTOLIC BLOOD PRESSURE: 74 MMHG | TEMPERATURE: 98 F | HEART RATE: 88 BPM

## 2021-05-01 LAB
GLUCOSE BLDC GLUCOMTR-MCNC: 113 MG/DL — HIGH (ref 70–99)
GLUCOSE BLDC GLUCOMTR-MCNC: 147 MG/DL — HIGH (ref 70–99)
GLUCOSE BLDC GLUCOMTR-MCNC: 155 MG/DL — HIGH (ref 70–99)

## 2021-05-01 PROCEDURE — 80048 BASIC METABOLIC PNL TOTAL CA: CPT

## 2021-05-01 PROCEDURE — 92526 ORAL FUNCTION THERAPY: CPT

## 2021-05-01 PROCEDURE — U0005: CPT

## 2021-05-01 PROCEDURE — 80053 COMPREHEN METABOLIC PANEL: CPT

## 2021-05-01 PROCEDURE — 97535 SELF CARE MNGMENT TRAINING: CPT

## 2021-05-01 PROCEDURE — 82962 GLUCOSE BLOOD TEST: CPT

## 2021-05-01 PROCEDURE — 36415 COLL VENOUS BLD VENIPUNCTURE: CPT

## 2021-05-01 PROCEDURE — 97163 PT EVAL HIGH COMPLEX 45 MIN: CPT

## 2021-05-01 PROCEDURE — 99232 SBSQ HOSP IP/OBS MODERATE 35: CPT

## 2021-05-01 PROCEDURE — 97112 NEUROMUSCULAR REEDUCATION: CPT

## 2021-05-01 PROCEDURE — 92507 TX SP LANG VOICE COMM INDIV: CPT

## 2021-05-01 PROCEDURE — 92610 EVALUATE SWALLOWING FUNCTION: CPT

## 2021-05-01 PROCEDURE — U0003: CPT

## 2021-05-01 PROCEDURE — 85025 COMPLETE CBC W/AUTO DIFF WBC: CPT

## 2021-05-01 PROCEDURE — 92523 SPEECH SOUND LANG COMPREHEN: CPT

## 2021-05-01 PROCEDURE — 70450 CT HEAD/BRAIN W/O DYE: CPT

## 2021-05-01 PROCEDURE — 97116 GAIT TRAINING THERAPY: CPT

## 2021-05-01 PROCEDURE — 97167 OT EVAL HIGH COMPLEX 60 MIN: CPT

## 2021-05-01 PROCEDURE — 97110 THERAPEUTIC EXERCISES: CPT

## 2021-05-01 PROCEDURE — 97530 THERAPEUTIC ACTIVITIES: CPT

## 2021-05-01 PROCEDURE — 83735 ASSAY OF MAGNESIUM: CPT

## 2021-05-01 RX ADMIN — Medication 2: at 12:17

## 2021-05-01 RX ADMIN — PANTOPRAZOLE SODIUM 40 MILLIGRAM(S): 20 TABLET, DELAYED RELEASE ORAL at 06:00

## 2021-05-01 RX ADMIN — ENOXAPARIN SODIUM 40 MILLIGRAM(S): 100 INJECTION SUBCUTANEOUS at 12:20

## 2021-05-01 RX ADMIN — METFORMIN HYDROCHLORIDE 500 MILLIGRAM(S): 850 TABLET ORAL at 17:36

## 2021-05-01 RX ADMIN — AMLODIPINE BESYLATE 10 MILLIGRAM(S): 2.5 TABLET ORAL at 05:53

## 2021-05-01 RX ADMIN — LIDOCAINE 1 PATCH: 4 CREAM TOPICAL at 12:18

## 2021-05-01 RX ADMIN — LISINOPRIL 20 MILLIGRAM(S): 2.5 TABLET ORAL at 05:54

## 2021-05-01 RX ADMIN — Medication 12.5 MILLIGRAM(S): at 05:54

## 2021-05-01 RX ADMIN — GABAPENTIN 600 MILLIGRAM(S): 400 CAPSULE ORAL at 12:22

## 2021-05-01 RX ADMIN — Medication 10 UNIT(S): at 08:30

## 2021-05-01 RX ADMIN — Medication 10 UNIT(S): at 12:17

## 2021-05-01 RX ADMIN — Medication 10 UNIT(S): at 17:02

## 2021-05-01 RX ADMIN — Medication 2 GRAM(S): at 17:36

## 2021-05-01 RX ADMIN — LIDOCAINE 1 PATCH: 4 CREAM TOPICAL at 00:00

## 2021-05-01 RX ADMIN — LISINOPRIL 20 MILLIGRAM(S): 2.5 TABLET ORAL at 17:36

## 2021-05-01 RX ADMIN — CLOPIDOGREL BISULFATE 75 MILLIGRAM(S): 75 TABLET, FILM COATED ORAL at 12:20

## 2021-05-01 RX ADMIN — Medication 81 MILLIGRAM(S): at 12:20

## 2021-05-01 RX ADMIN — GABAPENTIN 300 MILLIGRAM(S): 400 CAPSULE ORAL at 05:57

## 2021-05-01 RX ADMIN — Medication 1 APPLICATION(S): at 05:55

## 2021-05-01 RX ADMIN — Medication 2 GRAM(S): at 05:53

## 2021-05-01 RX ADMIN — MAGNESIUM OXIDE 400 MG ORAL TABLET 400 MILLIGRAM(S): 241.3 TABLET ORAL at 08:32

## 2021-05-01 NOTE — PROGRESS NOTE ADULT - SUBJECTIVE AND OBJECTIVE BOX
Patient is a 65y old  Female who presents with a chief complaint of L lateral medullary CVA  1.4 (30 Apr 2021 09:52)      HPI:  65F hx of HTN, HLD, T2DM off meds since 2018 and has not followed up with PMD since then presenting with R sided numbness. Pt related she has not felt well which started about 4 days ago and felt her balance was off and occasional dizziness. Today after waking up, she took a shower at 11AM and at the time noticed exquisite painful sensitivity in the R lower extremity and throughout the day noted numbness in the RLE and RUE and R side of face and even on the R torso. She went to get a pedicure at around 2 PM and noticed again exquisite painful sensitivity in the R foot when placed in water. She did not have slurring of speech, visual issues, dysphagia, or focal weakness. She went to see PMD and advised ED visit to r/o CVA. In ED, out of window for tPA. Afebrile BP: 187/104 P: 74 sat 100% on RA. CT head and CT angio neg for acute infarct but with focal stenosis in bl M2 MCA and bl PCA. Blood glucose was 511. S/p IV Labetalol 20mg in ED and 6U regular insulin. Pt feels well otherwise - wanted to sign out AMA because she didn't want to be moved upstairs where her mother had passed.     CTH unremarkable. 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 CTA revealed multiple B/L severe focal stenoses prox M2 branch and both MCAs B/L in PCA. As per neuro, continue high dose statin, ASA and Plavix for three weeks (started 4/14/21) then just ASA monotherapy. Cardio rec LON, done 4/21/21 which showed no cardiac source of embolism.    Patient was medically stable for discharge to [place] for acute rehab 4/21/21.   (21 Apr 2021 15:46)    Patient planned for discharge home today. Patient denies complaint, states she feels ready for discharge.     REVIEW OF SYSTEMS  denies new complaints  denies chest pain or shortness of breath    PAST MEDICAL & SURGICAL HISTORY  HTN (hypertension)  HLD (hyperlipidemia)  Diabetes mellitus  History of ankle surgery    FAMILY HISTORY   Family history of CVA (Father)      RECENT LABS/IMAGING  CBC Full  -  ( 30 Apr 2021 06:25 )  WBC Count : 5.94 K/uL  RBC Count : 4.72 M/uL  Hemoglobin : 13.0 g/dL  Hematocrit : 40.9 %  Platelet Count - Automated : 208 K/uL  Mean Cell Volume : 86.7 fl  Mean Cell Hemoglobin : 27.5 pg  Mean Cell Hemoglobin Concentration : 31.8 gm/dL  Auto Neutrophil # : 2.72 K/uL  Auto Lymphocyte # : 2.43 K/uL  Auto Monocyte # : 0.53 K/uL  Auto Eosinophil # : 0.22 K/uL  Auto Basophil # : 0.03 K/uL  Auto Neutrophil % : 45.8 %  Auto Lymphocyte % : 40.9 %  Auto Monocyte % : 8.9 %  Auto Eosinophil % : 3.7 %  Auto Basophil % : 0.5 %              VITALS  T(C): 36.4 (05-01-21 @ 08:07), Max: 36.6 (04-30-21 @ 21:02)  HR: 88 (05-01-21 @ 08:07) (78 - 94)  BP: 127/74 (05-01-21 @ 08:07) (127/74 - 166/77)  RR: 14 (05-01-21 @ 08:07) (14 - 15)  SpO2: 99% (05-01-21 @ 08:07) (99% - 99%)  Wt(kg): --    ALLERGIES  No Known Allergies      MEDICATIONS   acetaminophen   Tablet .. 650 milliGRAM(s) Oral every 6 hours PRN  amLODIPine   Tablet 10 milliGRAM(s) Oral daily  AQUAPHOR (petrolatum Ointment) 1 Application(s) Topical two times a day  aspirin enteric coated 81 milliGRAM(s) Oral daily  atorvastatin 80 milliGRAM(s) Oral at bedtime  clopidogrel Tablet 75 milliGRAM(s) Oral daily  dextrose 40% Gel 15 Gram(s) Oral once  dextrose 5%. 1000 milliLiter(s) IV Continuous <Continuous>  dextrose 5%. 1000 milliLiter(s) IV Continuous <Continuous>  dextrose 50% Injectable 25 Gram(s) IV Push once  dextrose 50% Injectable 12.5 Gram(s) IV Push once  dextrose 50% Injectable 25 Gram(s) IV Push once  enoxaparin Injectable 40 milliGRAM(s) SubCutaneous daily  gabapentin 600 milliGRAM(s) Oral at bedtime  gabapentin 300 milliGRAM(s) Oral <User Schedule>  gabapentin 600 milliGRAM(s) Oral <User Schedule>  glucagon  Injectable 1 milliGRAM(s) IntraMuscular once  hydrochlorothiazide 12.5 milliGRAM(s) Oral daily  insulin glargine Injectable (LANTUS) 30 Unit(s) SubCutaneous at bedtime  insulin lispro (ADMELOG) corrective regimen sliding scale   SubCutaneous three times a day before meals  insulin lispro (ADMELOG) corrective regimen sliding scale   SubCutaneous at bedtime  insulin lispro Injectable (ADMELOG) 10 Unit(s) SubCutaneous three times a day before meals  lidocaine   Patch 1 Patch Transdermal every 24 hours  lisinopril 20 milliGRAM(s) Oral two times a day  magnesium oxide 400 milliGRAM(s) Oral with breakfast  metFORMIN 500 milliGRAM(s) Oral <User Schedule>  omega-3-Acid Ethyl Esters 2 Gram(s) Oral two times a day  ondansetron    Tablet 4 milliGRAM(s) Oral every 6 hours PRN  pantoprazole    Tablet 40 milliGRAM(s) Oral before breakfast  senna 2 Tablet(s) Oral at bedtime PRN  traMADol 25 milliGRAM(s) Oral every 6 hours PRN      ----------------------------------------------------------------------------------------  Physical Exam:  Gen - NAD, Comfortable  HEENT - NCAT, EOMI  Neck - Supple, No limited ROM  Pulm - no respiratory distress  Cardiovascular - RRR, S1S2   Abdomen - Soft, NT/ND, +BS  Extremities - No C/C/E, No calf tenderness  Neuro-     Cognitive - AAOx3     Communication - Fluent, mild dysarthria     Cranial Nerves -mild left facial droop, sensory impairment, dysarthria     Motor -                    LEFT    UE - ShAB 5/5, EF 5/5, EE 5/5, WE 5/5,  5/5                    RIGHT UE - ShAB 5/5, EF 5/5, EE 5/5, WE 5/5,  5/5                    LEFT    LE - HF 5/5, KE 5/5, DF 5/5, PF 5/5                    RIGHT LE - HF 5/5, KE 5/5, DF 5/5, PF 5/5     Psychiatric - Mood stable, Affect WNL        Assessment and Plan:  RUFINO MARTINEZ is a 65 y.o. 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    planned for discharge home today.  currently receiving Comprehensive Multidisciplinary Rehab Program:   PT/OT/SLP 3 hours a day, 5 days a week.  - Precautions: falls    #L Lateral Medullary CVA  - Continue ASA and Plavix. Last dose of Plavix 5/5/21  - Continue Lipitor 80mg QHS, Fish oil  - LON 4/21/21: no cardiac source of embolism  - F/u neuro outpatient, Dr. Owen  - Due to severe headache, ordered CTH stat 4/26. CTH stable. Likely vestibulo-ocular component.  --Trial vestibular exercises as tolerated in PT  --educated on risk factors for future strokes and how to address    #HTN  --BP controlled  --Continue Amlodipine 10mg daily  - Lisinopril 20mg BID  -SBP goal 110-140    #DM  - Lantus 30U QHS   -Admelog to 10U TID AC-  -Metformin   - FS and ISS  - insulin teaching  -Cont.  Home regimen is Lantus 30U QHS and Admelog 10U TID AC. Metformin 500mg daily on discharge, to be titrated up slowly as outpatient due to GI side effects.  - Will f/u with Dr. Tanisha Carlson outpatient for DM/insulin management.       Pain Management:  - Tylenol PRN, Gabapentin, Tramadol  --Lidocaine patch-  - Gabapentin 300/600/600 mg  --Mag Ox for H/A ppx.  - Avoid sedating medications that may interfere with cognitive recovery    GI/Bowel:  - Senna QHS, Miralax PRN Daily  - GI ppx: Protonix    DVT ppx:  - Lovenox--d/c on discharge  - SCDs

## 2021-05-01 NOTE — PROGRESS NOTE ADULT - PROVIDER SPECIALTY LIST ADULT
Hospitalist
Hospitalist
Rehab Medicine
Cardiology
Hospitalist
Internal Medicine
Rehab Medicine
Hospitalist
Rehab Medicine
Hospitalist
Hospitalist
Rehab Medicine

## 2021-05-01 NOTE — PROGRESS NOTE ADULT - NSICDXPILOT_GEN_ALL_CORE
[place]

## 2021-05-01 NOTE — PROGRESS NOTE ADULT - REASON FOR ADMISSION
L lateral medullary CVA  1.4
L lateral medullary CVA
L lateral medullary CVA  1.4

## 2021-05-06 ENCOUNTER — APPOINTMENT (OUTPATIENT)
Dept: FAMILY MEDICINE | Facility: CLINIC | Age: 66
End: 2021-05-06
Payer: COMMERCIAL

## 2021-05-06 ENCOUNTER — NON-APPOINTMENT (OUTPATIENT)
Age: 66
End: 2021-05-06

## 2021-05-06 VITALS
SYSTOLIC BLOOD PRESSURE: 127 MMHG | WEIGHT: 187 LBS | BODY MASS INDEX: 30.18 KG/M2 | DIASTOLIC BLOOD PRESSURE: 74 MMHG | HEART RATE: 92 BPM | OXYGEN SATURATION: 99 % | RESPIRATION RATE: 16 BRPM | TEMPERATURE: 98.2 F

## 2021-05-06 DIAGNOSIS — E11.9 TYPE 2 DIABETES MELLITUS W/OUT COMPLICATIONS: ICD-10-CM

## 2021-05-06 LAB — HBA1C MFR BLD HPLC: 11.8

## 2021-05-06 PROCEDURE — 99072 ADDL SUPL MATRL&STAF TM PHE: CPT

## 2021-05-06 PROCEDURE — 99214 OFFICE O/P EST MOD 30 MIN: CPT | Mod: 25

## 2021-05-06 PROCEDURE — 83036 HEMOGLOBIN GLYCOSYLATED A1C: CPT | Mod: QW

## 2021-05-06 RX ORDER — INSULIN DETEMIR 100 [IU]/ML
100 INJECTION, SOLUTION SUBCUTANEOUS DAILY
Qty: 1 | Refills: 3 | Status: DISCONTINUED | COMMUNITY
Start: 2018-04-20 | End: 2021-05-06

## 2021-05-06 RX ORDER — NAPROXEN 500 MG/1
500 TABLET ORAL
Qty: 1 | Refills: 0 | Status: DISCONTINUED | COMMUNITY
Start: 2018-06-05 | End: 2021-05-06

## 2021-05-06 NOTE — PHYSICAL EXAM
[No Acute Distress] : no acute distress [Well-Appearing] : well-appearing [PERRL] : pupils equal round and reactive to light [Normal] : no respiratory distress, lungs were clear to auscultation bilaterally and no accessory muscle use [Soft] : abdomen soft [Non Tender] : non-tender [No CVA Tenderness] : no CVA  tenderness [No Spinal Tenderness] : no spinal tenderness [No Joint Swelling] : no joint swelling [de-identified] : left eye drags behind weakness lateral rectus left eye ?? [de-identified] : uses cane to ambulate

## 2021-05-06 NOTE — HISTORY OF PRESENT ILLNESS
[FreeTextEntry1] : hospital discharge f/u [de-identified] : 64 y/o Uncontrolled DM2 HTN HLD Obesity stopped meds since 2018 did not f/u came in to clinic c/o right sided weakness advised to to to ER r/o stroke refused then called back and did agree to go to ER. Pt presented 4 days after started symptoms right sided changes  admitted found to have MRI : acute left medullary infarct CT Stenosis bilateral M2 MCA and bilateral PCA blood glucose 511. pt now here f/u post acute rehab  pt notes is taking all meds but stopped gabapentin and still getting leg pain States is taking all other medications as directed has to complete 3 weeks Plavix. \par pt denies CP SOB palpitations.  Notes her head is not the same is like is in fussed ever since CVA and states also her gait is off.

## 2021-05-06 NOTE — HEALTH RISK ASSESSMENT
[Intercurrent hospitalizations] : was admitted to the hospital  [de-identified] : 4/13/2021 through 4/30/2021

## 2021-05-06 NOTE — ASSESSMENT
[FreeTextEntry1] : d/w pt a1c need ot get under 7  will keep apt DM2 educator risks and benefits of medications d/w pt Declined pap today but notes will come back and get it done next time

## 2021-05-06 NOTE — REVIEW OF SYSTEMS
[Vision Problems] : vision problems [Joint Pain] : joint pain [Muscle Weakness] : muscle weakness [Muscle Pain] : muscle pain [Dizziness] : dizziness [Unsteady Walking] : ataxia [Negative] : Heme/Lymph [FreeTextEntry3] : feels left eye weak after stroke no changes vision  [FreeTextEntry9] : s/p CVA right sided weakness and left eye muscle weakness

## 2021-05-07 ENCOUNTER — APPOINTMENT (OUTPATIENT)
Dept: FAMILY MEDICINE | Facility: HOSPITAL | Age: 66
End: 2021-05-07
Payer: COMMERCIAL

## 2021-05-07 LAB
CREAT SPEC-SCNC: 198 MG/DL
MICROALBUMIN 24H UR DL<=1MG/L-MCNC: 2.1 MG/DL
MICROALBUMIN/CREAT 24H UR-RTO: 11 MG/G

## 2021-05-07 PROCEDURE — 99072 ADDL SUPL MATRL&STAF TM PHE: CPT

## 2021-05-07 PROCEDURE — 99215 OFFICE O/P EST HI 40 MIN: CPT

## 2021-05-07 RX ORDER — BLOOD-GLUCOSE METER
W/DEVICE EACH MISCELLANEOUS
Qty: 1 | Refills: 0 | Status: COMPLETED | COMMUNITY
Start: 2018-03-05 | End: 2021-05-07

## 2021-05-07 NOTE — REVIEW OF SYSTEMS
[Fatigue] : no fatigue [Visual Field Defect] : no visual field defect [Blurred Vision] : no blurred vision [Shortness Of Breath] : no shortness of breath [Nausea] : no nausea [Diarrhea] : no diarrhea [Gas/Bloating] : no gas/bloating [Polyuria] : no polyuria [Polydipsia] : no polydipsia

## 2021-05-07 NOTE — HISTORY OF PRESENT ILLNESS
[FreeTextEntry1] : HERE TODAY FOR POST HOSPITAL DIABETES VISIT\par RECENTLY HOSPITALIZED FOR CVA \par A1C 11.8% \par STATES SHE STOPPED TAKING METFORMIN 2 YEARS AGO BECAUSE IT GAVE HER DIARRHEA\par HAD NOT FOLLOWED W/ PCP x 2 YEARS\par WAS SEEN IN HOSPITAL BY DIABETES EDUCATOR AND WAS INSTRUCTED ON INSULIN PEN AND BLOOD GLUCOSE METER \par REPORTS COMPLIANCE W/ LANTUS 30  UNITS  QHS AND NOVOLOG 10 UNITS PRE-MEAL\par REPORTS TOLERATING METFORMIN 500 MG WITH DINNER DAILY\par TESTING BG AC & HS\par FASTING  (MOST AROUND 100\par PRE-LUNCH \par PRE-DINNER \par HS \par NO READINGS < 70 OR SIGNS AND SYMPTOMS OF HYPOGLYCEMIA \par LIVES W/ SPOUSE AND 2 VERY INVOLVED DAUGHTERS\par DAUGHTERS ARE HELPING PATIENT MANAGE MEDICATIONS AND BG TESTING\par PT STATES SHE SAW DR FOY (ENDO YESTERDAY) WHO GAVE HER SAMPLES OF JARDIANCE AMD TOLD HER TO REDUCE PRE-MEAL NOVOLOG FROM 10 TO 5 UNITS PRE-MEAL ONCE SHE STARTS JARDIANCE\par NEEDS OPTHO\par HX DIABETIC NEUROPATHY\par DOES NOT EXERCISE \par HAS NOT RECEIVED COVID VACCINES DUE TO VACCINE HESITANCY \par \par

## 2021-05-07 NOTE — ASSESSMENT
[Diabetes Foot Care] : diabetes foot care [Long Term Vascular Complications] : long term vascular complications of diabetes [Carbohydrate Consistent Diet] : carbohydrate consistent diet [Importance of Diet and Exercise] : importance of diet and exercise to improve glycemic control, achieve weight loss and improve cardiovascular health [Exercise/Effect on Glucose] : exercise/effect on glucose [Hypoglycemia Management] : hypoglycemia management [Action and use of Insulin] : action and use of short and long-acting insulin [Self Monitoring of Blood Glucose] : self monitoring of blood glucose [Injection Technique, Storage, Sharps Disposal] : injection technique, storage, and sharps disposal [Sick-Day Management] : sick-day management [Retinopathy Screening] : Patient was referred to ophthalmology for retinopathy screening [FreeTextEntry1] : NAMAN PROFESSIONAL CGM PLACED IN RUE\par EDUCATED ON NEED TO SCAN AT LEAST QH 8 TO AVOID GAPS IN DATA\par INSTRUCTED TO REMOVE PRIOR TO ANY X-RAY, MRI OR CT SCAN\par EXPLAINED THAT CGM IS TESTING INTERSTITIAL FLUID, RATHER THAN THE CAPILLARY BLOOD MEASURED WHEN PT PERFORMS A FINGERSTICK\par INSTRUCTED TO VERIFY ANY RESULTS THAT SEEM INACCURATE OR IF HAVING FEELINGS OF LOW BG BY PERFORMING FINGERSTICK\par EDUCATED ON SIGNS AND SYMPTOMS OF SITE INFECTION AND IMPORTANCE OF REMOVING SENSOR AND CALLING OFFICE ASAP \par EXPLAINED TO PATIENT THAT SHE NEEDS TO FOLLOW WITH ONLY 1 PERSON TO MANAGE HER DIABETES, THAT BOTH DR FOY AND I CANNOT CO-MANAGE.  \par INSTRUCTED TO FOLLOW DR FOY'S ADVICE TO START JARDIANCE 10 MG DAILY AND TO REDUCE NOVOLOG PRE-MEAL FROM 10 TO 5 UNITS AC.\par GIVEN CURRENT BG READINGS, INSTRUCTED TO DECREASE LANTUS FROM 30 TO 24 UNITS QHS ONCE SHE STARTS JARDIANCE\par EMAIL SENT TO DAUGHTER TO SHARE CGM DATA\par .INSTRUCTED TO CALL OFFICE FOR BG < 70, > 250 OR FOR ANY QUESTIONS OR CONCERN\par ENCOURAGED TO FOLLOW UP WITH DR FOY

## 2021-05-07 NOTE — REASON FOR VISIT
[Follow - Up] : a follow-up visit [DM Type 2] : DM Type 2 [Family Member] : family member [Other: _____] : [unfilled]

## 2021-05-07 NOTE — PHYSICAL EXAM
[Alert] : alert [Obese] : obese [No Acute Distress] : no acute distress [No Respiratory Distress] : no respiratory distress [Foot Ulcers] : no foot ulcers [Right Foot Was Examined] : right foot ~C was examined [Left Foot Was Examined] : left foot ~C was examined [Delayed in the Right Toes] : normal in the toes [Normal] : normal [Delayed in the Left Toes] : normal in the toes [2+] : 2+ in the dorsalis pedis [Diminished Throughout Both Feet] : normal tactile sensation with monofilament testing throughout both feet

## 2021-05-10 RX ORDER — EMPAGLIFLOZIN 10 MG/1
10 TABLET, FILM COATED ORAL DAILY
Qty: 30 | Refills: 3 | Status: COMPLETED | COMMUNITY
Start: 2021-05-07 | End: 2021-05-07

## 2021-05-11 ENCOUNTER — NON-APPOINTMENT (OUTPATIENT)
Age: 66
End: 2021-05-11

## 2021-05-21 RX ORDER — FLASH GLUCOSE SENSOR
KIT MISCELLANEOUS
Qty: 1 | Refills: 5 | Status: COMPLETED | COMMUNITY
Start: 2018-07-11 | End: 2021-05-21

## 2021-05-21 RX ORDER — DAPAGLIFLOZIN 5 MG/1
5 TABLET, FILM COATED ORAL DAILY
Qty: 30 | Refills: 0 | Status: COMPLETED | COMMUNITY
Start: 2021-05-10 | End: 2021-05-21

## 2021-05-27 ENCOUNTER — NON-APPOINTMENT (OUTPATIENT)
Age: 66
End: 2021-05-27

## 2021-05-28 ENCOUNTER — APPOINTMENT (OUTPATIENT)
Dept: FAMILY MEDICINE | Facility: HOSPITAL | Age: 66
End: 2021-05-28

## 2021-06-01 ENCOUNTER — APPOINTMENT (OUTPATIENT)
Dept: FAMILY MEDICINE | Facility: CLINIC | Age: 66
End: 2021-06-01
Payer: COMMERCIAL

## 2021-06-01 VITALS — DIASTOLIC BLOOD PRESSURE: 64 MMHG | SYSTOLIC BLOOD PRESSURE: 120 MMHG

## 2021-06-01 VITALS — HEART RATE: 86 BPM

## 2021-06-01 DIAGNOSIS — Z87.898 PERSONAL HISTORY OF OTHER SPECIFIED CONDITIONS: ICD-10-CM

## 2021-06-01 PROCEDURE — 99215 OFFICE O/P EST HI 40 MIN: CPT | Mod: 25

## 2021-06-01 PROCEDURE — 99072 ADDL SUPL MATRL&STAF TM PHE: CPT

## 2021-06-01 PROCEDURE — 95251 CONT GLUC MNTR ANALYSIS I&R: CPT

## 2021-06-01 PROCEDURE — 95250 CONT GLUC MNTR PHYS/QHP EQP: CPT

## 2021-06-01 NOTE — PHYSICAL EXAM
[Alert] : alert [No Acute Distress] : no acute distress [No Respiratory Distress] : no respiratory distress [Normal Gait] : normal gait [No Joint Swelling] : no joint swelling seen [Oriented x3] : oriented to person, place, and time [Normal Affect] : the affect was normal [Recent Memory Normal] : recent memory was not impaired [Normal Insight/Judgement] : insight and judgment were intact [Normal Mood] : the mood was normal [Remote Memory Normal] : remote memory was not impaired

## 2021-06-01 NOTE — ASSESSMENT
[Diabetes Foot Care] : diabetes foot care [Carbohydrate Consistent Diet] : carbohydrate consistent diet [Importance of Diet and Exercise] : importance of diet and exercise to improve glycemic control, achieve weight loss and improve cardiovascular health [Hypoglycemia Management] : hypoglycemia management [Retinopathy Screening] : Patient was referred to ophthalmology for retinopathy screening [Weight Loss] : weight loss [FreeTextEntry1] : EDUCATED ON NEED TO SCAN AT LEAST QH 8 TO AVOID GAPS IN DATA\par INSTRUCTED TO REMOVE PRIOR TO ANY X-RAY, MRI OR CT SCAN\par EXPLAINED THAT CGM IS TESTING INTERSTITIAL FLUID, RATHER THAN THE CAPILLARY BLOOD MEASURED WHEN PT PERFORMS A FINGERSTICK\par INSTRUCTED TO VERIFY ANY RESULTS THAT SEEM INACCURATE OR IF HAVING FEELINGS OF LOW BG BY PERFORMING FINGERSTICK\par EDUCATED ON SIGNS AND SYMPTOMS OF SITE INFECTION AND IMPORTANCE OF REMOVING SENSOR AND CALLING OFFICE ASAP PT WAS CONGRATULATED ON GETTING BG UNDER BETTER CONTROL\par EXPRESSED DESIRE TO GET OFF INSULIN IF POSSIBLE\par OFFERED TO ADD GLP1 TO ASSIST W/ BG CONTROL AND WEIGHT LOSS.  PT DECLINED STATING THAT SHE IS HAVING TROUBLE PAYING FOR MEDICATIONS AS IT IS AND DOES NOT WANT TO ADD ANOTHER AGENT AT THIS TIME. \par INSTRUCTED TO ADD ADDITIONAL METFORMIN  MG W/ BREAKFAST.  ENCOURAGED TO TAKE ON A DAY THAT SHE DOES NOT HAVE TO LEAVE THE HOUSE SO SHE WILL BE CLOSE TO THE BATHROOM SHOULD DIARRHEA OCCUR\par INSTRUCTED TO DECREASE LANTUS FROM  24 TO  20 UNITS QHS\par DECREASE NOVOLOG FROM 5 TO 4 UNITS PRE-MEAL\par WRITTEN INSTRUCTIONS GIVEN AND REVIEWED \par PT WAS ABLE TO "TEACH BACK" ALL INSTRUCTIONS \par .INSTRUCTED TO CALL OFFICE FOR BG < 70, > 250 OR FOR ANY QUESTIONS OR CONCERNS \par WILL BRING READER TO OFFICE FOR DOWNLOAD IN 2 WEEKS IF DAUGHTER UNABLE TO DOWNLOAD AND EMAIL\par SCHEDULED TO RETURN TO OFFICE ON 7/27/21\par

## 2021-06-01 NOTE — HISTORY OF PRESENT ILLNESS
[FreeTextEntry1] : HERE TODAY FOR DIABETES FOLLOW UP\par FEELS WELL\par DENIES POLYURIA, POLYDIPSIA OR UNINTENTIONAL WEIGHT LOSS \par REPORTS COMPLIANCE W/ LANTUS 24 UNITS QHS \par REPORTS COMPLIANCE W/ NOVOLOG 5  UNITS PRE-MEAL \par ONLY TAKING METFORMIN  MG ONCE DAILY W/ DINNER FOR FEAR OF DIARRHEA\par TOLERATING JARDIANCE.  DENIES SIGN/SYMPTOMS OF UTI OR GENITAL INFECTION. \par REPORTS FOLLOWING THE DIET SHE ATE IN THE HOSPITAL.  EATING MORE VEGETABLES AND FRUITS RATHER THAN SWEETS\par STATES "I FEEL LIKE A NEW PERSON"\par WEARING NAMAN 2 CGM \par CGM DOWNLOAD\par BG AVERAGE  117\par WORN FOR 14 DAYS\par BG    \par         < 54         0%\par          54-69      0%\par              97%\par         181-250   3%\par          > 250       0%\par GLUCOSE VARIABILITY 22% (TARGET LESS THAN OR EQUAL TO 36%) \par SEVERAL GAPS IN DATA SINCE PT WAS NOT SCANNING AFTER 7 PM\par REPORTS HAVING DIFFICULTIES AFFORDING MEDICATION AND CGM CO-PAYMENTS\par NEEDS OPTHO - CONTACT NUMBERS GIVEN\par

## 2021-06-01 NOTE — REVIEW OF SYSTEMS
[Fatigue] : no fatigue [Blurred Vision] : no blurred vision [Nausea] : no nausea [Diarrhea] : no diarrhea [Gas/Bloating] : no gas/bloating [Polyuria] : no polyuria [Dysuria] : no dysuria [Pelvic Pain] : no pelvic pain [Vaginal Discharge] : no vaginal discharge [Polydipsia] : no polydipsia

## 2021-06-02 ENCOUNTER — NON-APPOINTMENT (OUTPATIENT)
Age: 66
End: 2021-06-02

## 2021-06-03 ENCOUNTER — NON-APPOINTMENT (OUTPATIENT)
Age: 66
End: 2021-06-03

## 2021-06-25 RX ORDER — LANCETS 33 GAUGE
EACH MISCELLANEOUS
Qty: 100 | Refills: 2 | Status: COMPLETED | COMMUNITY
Start: 2018-04-20 | End: 2021-06-25

## 2021-06-25 RX ORDER — BLOOD SUGAR DIAGNOSTIC
STRIP MISCELLANEOUS DAILY
Qty: 100 | Refills: 3 | Status: COMPLETED | COMMUNITY
Start: 2018-04-20 | End: 2021-06-25

## 2021-06-25 RX ORDER — BLOOD-GLUCOSE METER
W/DEVICE EACH MISCELLANEOUS
Qty: 1 | Refills: 0 | Status: COMPLETED | COMMUNITY
Start: 2018-04-20 | End: 2021-06-25

## 2021-06-28 ENCOUNTER — APPOINTMENT (OUTPATIENT)
Dept: FAMILY MEDICINE | Facility: CLINIC | Age: 66
End: 2021-06-28
Payer: COMMERCIAL

## 2021-06-28 VITALS
HEART RATE: 91 BPM | TEMPERATURE: 96.6 F | DIASTOLIC BLOOD PRESSURE: 60 MMHG | OXYGEN SATURATION: 99 % | WEIGHT: 189 LBS | SYSTOLIC BLOOD PRESSURE: 108 MMHG | BODY MASS INDEX: 30.37 KG/M2 | RESPIRATION RATE: 16 BRPM | HEIGHT: 66 IN

## 2021-06-28 PROCEDURE — 99072 ADDL SUPL MATRL&STAF TM PHE: CPT

## 2021-06-28 PROCEDURE — 99213 OFFICE O/P EST LOW 20 MIN: CPT

## 2021-06-28 NOTE — HISTORY OF PRESENT ILLNESS
[FreeTextEntry1] : f/u  [de-identified] : 65 y/o Uncontrolled DM2 doing well taking meds as directed. HTN recent CVA  still numb tingling right side Pt notes has been takign all meds as directed and nees supplies sent to [facility]. Also states did not do eye eval apt for DM2. pt otherwise feels is doing  well. pt notes did ate cup cake for her birthday and blood sugar went up.

## 2021-06-28 NOTE — COUNSELING
[Potential consequences of obesity discussed] : Potential consequences of obesity discussed [Benefits of weight loss discussed] : Benefits of weight loss discussed [Structured Weight Management Program suggested:] : Structured weight management program suggested [Encouraged to maintain food diary] : Encouraged to maintain food diary [Encouraged to increase physical activity] : Encouraged to increase physical activity [Encouraged to use exercise tracking device] : Encouraged to use exercise tracking device [Weigh Self Weekly] : weigh self weekly [____ min/wk Activity] : [unfilled] min/wk activity

## 2021-06-30 ENCOUNTER — RESULT CHARGE (OUTPATIENT)
Age: 66
End: 2021-06-30

## 2021-06-30 LAB — HBA1C MFR BLD HPLC: 7.8

## 2021-06-30 RX ORDER — BLOOD PRESSURE TEST KIT-MEDIUM
KIT MISCELLANEOUS
Qty: 1 | Refills: 0 | Status: ACTIVE | COMMUNITY
Start: 2021-05-28 | End: 1900-01-01

## 2021-07-01 ENCOUNTER — NON-APPOINTMENT (OUTPATIENT)
Age: 66
End: 2021-07-01

## 2021-07-30 ENCOUNTER — NON-APPOINTMENT (OUTPATIENT)
Age: 66
End: 2021-07-30

## 2021-08-02 ENCOUNTER — APPOINTMENT (OUTPATIENT)
Dept: FAMILY MEDICINE | Facility: CLINIC | Age: 66
End: 2021-08-02
Payer: COMMERCIAL

## 2021-08-02 ENCOUNTER — APPOINTMENT (OUTPATIENT)
Dept: FAMILY MEDICINE | Facility: HOSPITAL | Age: 66
End: 2021-08-02

## 2021-08-02 PROCEDURE — 99215 OFFICE O/P EST HI 40 MIN: CPT

## 2021-08-02 RX ORDER — EMPAGLIFLOZIN 10 MG/1
10 TABLET, FILM COATED ORAL
Qty: 30 | Refills: 5 | Status: COMPLETED | COMMUNITY
Start: 2021-05-21 | End: 2021-07-23

## 2021-08-02 NOTE — REVIEW OF SYSTEMS
[Fatigue] : no fatigue [Blurred Vision] : no blurred vision [Shortness Of Breath] : no shortness of breath [Cough] : no cough [Orthopnea] : no orthopnea [Nausea] : no nausea [Diarrhea] : no diarrhea [Gas/Bloating] : no gas/bloating [Polyuria] : no polyuria [Polydipsia] : no polydipsia

## 2021-08-02 NOTE — ASSESSMENT
[FreeTextEntry1] : PT WAS COMMENDED ON LOWERING HER A1C WITH MEDICATION AND LIFESTYLE CHANGES\par TREATMENT OPTIONS DISCUSSED TO HELP GET A1C CLOSER TO GOAL.  PT DOES NOT WISH TO RESUME JARDIANCE\par   RISKS/BENEFITS AND SIDE EFFECT PROFILE OF GLP1 EXPLAINED AND DISCUSSED.  PT DECLINED \par LENGTHY DISCUSSION ON CONTINUED LIFESTYLE CHANGES AND ADHERENCE TO MEDICATION REGIME\par STRONGLY ENCOURAGED TO OBTAIN EYE EXAM\par WAITING TO HEAR BACK FROM [facility] ABOUT INSURANCE COVERAGE FOR NAMAN.  WILL SCHEDULE APPT FOR TRAINING ONCE APPROVED.\par .INSTRUCTED TO CALL OFFICE FOR BG < 70, > 250 OR FOR ANY QUESTIONS OR CONCERNS \par SCHEDULED TO RETURN TO OFFICE TO SEE DR STEPHENS ON 9/30/21

## 2021-08-02 NOTE — HISTORY OF PRESENT ILLNESS
[FreeTextEntry1] : HERE TODAY FOR DIABETES FOLLOW UP\par FEELS WELL\par DENIES POLYURIA, POLYDIPSIA OR UNINTENTIONAL WEIGHT LOSS \par A1C 7.8% 6/30/21 - DOWN FROM 11.8% ON 5/6/21\par REPORTS COMPLIANCE W/ METFORMIN  MG 1 TAB WITH BREAKFAST AND DINNER - STATES UNWILLING TO TAKE 1,000 MG BID DUE TO FEARS OF DIARRHEA\par REPORTS COMPLIANCE W/ LANTUS 20 UNITS QHS - 24 UNITS PRESCRIBED BUT PT THOUGHT THE DOSE WAS ONLY 20 UNITS\par REPORTS COMPLIANCE W/ NOVOLOG 5 UNITS PRE-MEAL\par TESTING BG 1-3 x DAY.  READINGS \par NO READINGS < 70 OR SIGNS AND SYMPTOMS OF HYPOGLYCEMIA \par REPORTS SHE IS NO LONGER TAKING JARDIANCE SINCE SHE SAW A COMMERCIAL FOR JARDIANCE INSTRUCTING TO CALL PHYSICIAN IF DIZZY OR SOB.  STATES SHE HAS BEEN SOB SINCE STARTING JARDIANCE.  SHE STOPPED JARDIANCE AND STATES SHE IS NO LONGER SOB.  SHE DOES NOT WISH TO RESUME\par ALSO NOT TAKING GABAPENTIN - FEELS SHE DOESN'T NEED IT\par STATES SHE HAS NEVER TAKEN PANTOPRAZOLE - WAS NOT AWARE IT WAS PRESCRIBED\par PT HAS NOT RECEIVED COVID VACCINE - STATES SHE DOESN'T BELIEVE IN IT.  EDUCATION PROVIDED\par NEEDS OPTHO

## 2021-09-24 NOTE — DIETITIAN INITIAL EVALUATION ADULT. - PERTINENT MEDS FT
Notified pt results via pt portal.    ----- Message from Milagros Tracy MD sent at 9/23/2021  3:55 PM CDT -----  Call pt with results  Numbers still high but less than previous-continue to cut down on clonazepam use and repeat in 3 months       MEDICATIONS  (STANDING):  aspirin enteric coated 81 milliGRAM(s) Oral daily  atorvastatin 80 milliGRAM(s) Oral at bedtime  clopidogrel Tablet 75 milliGRAM(s) Oral daily  dextrose 40% Gel 15 Gram(s) Oral once  dextrose 5%. 1000 milliLiter(s) (50 mL/Hr) IV Continuous <Continuous>  dextrose 5%. 1000 milliLiter(s) (100 mL/Hr) IV Continuous <Continuous>  dextrose 50% Injectable 25 Gram(s) IV Push once  dextrose 50% Injectable 12.5 Gram(s) IV Push once  dextrose 50% Injectable 25 Gram(s) IV Push once  enoxaparin Injectable 40 milliGRAM(s) SubCutaneous daily  gabapentin 300 milliGRAM(s) Oral three times a day  glucagon  Injectable 1 milliGRAM(s) IntraMuscular once  insulin glargine Injectable (LANTUS) 35 Unit(s) SubCutaneous at bedtime  insulin lispro (ADMELOG) corrective regimen sliding scale   SubCutaneous three times a day before meals  insulin lispro (ADMELOG) corrective regimen sliding scale   SubCutaneous at bedtime  insulin lispro Injectable (ADMELOG) 15 Unit(s) SubCutaneous three times a day before meals  lidocaine   Patch 1 Patch Transdermal every 24 hours  lisinopril 20 milliGRAM(s) Oral two times a day  omega-3-Acid Ethyl Esters 2 Gram(s) Oral two times a day  pantoprazole    Tablet 40 milliGRAM(s) Oral before breakfast    MEDICATIONS  (PRN):  acetaminophen   Tablet .. 650 milliGRAM(s) Oral every 6 hours PRN Temp greater or equal to 38C (100.4F), Mild Pain (1 - 3)  ondansetron    Tablet 4 milliGRAM(s) Oral every 6 hours PRN Nausea and/or Vomiting  senna 2 Tablet(s) Oral at bedtime PRN Constipation  traMADol 25 milliGRAM(s) Oral every 6 hours PRN Moderate Pain (4 - 6)  traMADol 50 milliGRAM(s) Oral every 6 hours PRN Severe Pain (7 - 10)

## 2021-09-30 ENCOUNTER — APPOINTMENT (OUTPATIENT)
Dept: FAMILY MEDICINE | Facility: CLINIC | Age: 66
End: 2021-09-30

## 2021-09-30 DIAGNOSIS — I63.133 CEREBRAL INFARCTION DUE TO EMBOLISM OF BILATERAL CAROTID ARTERIES: ICD-10-CM

## 2021-09-30 DIAGNOSIS — Z23 ENCOUNTER FOR IMMUNIZATION: ICD-10-CM

## 2021-09-30 NOTE — HISTORY OF PRESENT ILLNESS
[Diabetes Mellitus] : Diabetes Mellitus [Hyperlipidemia] : Hyperlipidemia [Hypertension] : Hypertension [Obesity] : Obesity [Other: ___] : [unfilled] [FreeTextEntry6] : 67 y/o PMHX Uncontrolled DM2 HTN HLD recent CVA with right sided weakness here for f/u medical conditions. Self stop Jardiance sec ? SOB

## 2021-10-13 ENCOUNTER — NON-APPOINTMENT (OUTPATIENT)
Age: 66
End: 2021-10-13

## 2021-12-01 ENCOUNTER — RX RENEWAL (OUTPATIENT)
Age: 66
End: 2021-12-01

## 2021-12-06 NOTE — ED ADULT NURSE NOTE - NS ED NURSE LEVEL OF CONSCIOUSNESS SPEECH
Death Summary     Patient ID:    Mayela Mckeon  [id number]  74 y.o.  1954    Admit date: 11/5/2021    Date and time of death: 11/12/2021 at 22:27    Chronic Diagnoses:    Problem List as of 11/12/2021 Date Reviewed: 12/27/2020          Codes Class Noted - Resolved    Acute hypoxemic respiratory failure ([facility] ICD-10-CM: J96.01  ICD-9-CM: 518.81  11/5/2021 - Present        Acute hepatic encephalopathy ICD-10-CM: K72.00  ICD-9-CM: 572.2  10/26/2021 - Present        COPD (chronic obstructive pulmonary disease) ([facility] ICD-10-CM: J44.9  ICD-9-CM: 496  10/26/2021 - Present        H/O noncompliance with medical treatment, presenting hazards to health ICD-10-CM: Z91.19  ICD-9-CM: V15.81  10/26/2021 - Present        Acute encephalopathy ICD-10-CM: G93.40  ICD-9-CM: 348.30  10/23/2021 - Present        Hepatic encephalopathy ([facility] ICD-10-CM: K72.90  ICD-9-CM: 572.2  10/19/2021 - Present        Liver cirrhosis ([facility] ICD-10-CM: K74.60  ICD-9-CM: 571.5  12/26/2020 - Present        Pulmonary nodule ICD-10-CM: R91.1  ICD-9-CM: 793.11  12/26/2020 - Present        Intractable back pain ICD-10-CM: M54.9  ICD-9-CM: 724.5  12/26/2020 - Present        Closed compression fracture of L4 lumbar vertebra, initial encounter ([facility] ICD-10-CM: S32.040A  ICD-9-CM: 805.4  12/26/2020 - Present        Hypokalemia ICD-10-CM: E87.6  ICD-9-CM: 276.8  12/26/2020 - Present          22    Final Diagnoses: Active Problems:    Acute hypoxemic respiratory failure ([facility] (11/5/2021)        Reason for Hospitalization:  The patient is a 46-year-old male with a PMH significant for metastatic lung cancer, COPD CKD stage III, AICD and alcoholic liver cirrhosis. He was brought in via EMS for altered mental status and acute hypoxic respiratory failure. Hospital Course:   Required intubation, placed in the ICU the patient remains critically ill with poor prognosis. Unable to wean from the vent.   Family members have placed him under DNR status and want comfort care with terminal extubation. He was transferred to remote telemetry for. A consult for hospice was placed. The patient  at 21:27    Family notified.        Signed:  Gabby Mace NP  2021  3:30 PM Speaking Coherently

## 2021-12-27 ENCOUNTER — RX RENEWAL (OUTPATIENT)
Age: 66
End: 2021-12-27

## 2021-12-30 ENCOUNTER — RX RENEWAL (OUTPATIENT)
Age: 66
End: 2021-12-30

## 2022-01-18 ENCOUNTER — NON-APPOINTMENT (OUTPATIENT)
Age: 67
End: 2022-01-18

## 2022-01-21 ENCOUNTER — NON-APPOINTMENT (OUTPATIENT)
Age: 67
End: 2022-01-21

## 2022-01-25 ENCOUNTER — RX RENEWAL (OUTPATIENT)
Age: 67
End: 2022-01-25

## 2022-04-11 ENCOUNTER — RESULT REVIEW (OUTPATIENT)
Age: 67
End: 2022-04-11

## 2022-04-25 ENCOUNTER — APPOINTMENT (OUTPATIENT)
Dept: FAMILY MEDICINE | Facility: CLINIC | Age: 67
End: 2022-04-25

## 2022-04-25 DIAGNOSIS — Z09 ENCOUNTER FOR FOLLOW-UP EXAMINATION AFTER COMPLETED TREATMENT FOR CONDITIONS OTHER THAN MALIGNANT NEOPLASM: ICD-10-CM

## 2022-04-29 NOTE — HISTORY OF PRESENT ILLNESS
[Diabetes Mellitus] : Diabetes Mellitus [Hyperlipidemia] : Hyperlipidemia [Hypertension] : Hypertension [Obesity] : Obesity [Other: ___] : [unfilled] [FreeTextEntry6] : 67 y/o PMHX HTN HLD DM2 Obesity h/o CVA here for f/u medical conditions

## 2022-05-01 ENCOUNTER — RX RENEWAL (OUTPATIENT)
Age: 67
End: 2022-05-01

## 2022-05-18 ENCOUNTER — NON-APPOINTMENT (OUTPATIENT)
Age: 67
End: 2022-05-18

## 2022-05-18 LAB
25(OH)D3 SERPL-MCNC: 13.3 NG/ML
ALBUMIN SERPL ELPH-MCNC: 4.1 G/DL
ALP BLD-CCNC: 52 U/L
ALT SERPL-CCNC: 31 U/L
ANION GAP SERPL CALC-SCNC: 14 MMOL/L
AST SERPL-CCNC: 36 U/L
BILIRUB SERPL-MCNC: 0.8 MG/DL
BUN SERPL-MCNC: 16 MG/DL
CALCIUM SERPL-MCNC: 9.9 MG/DL
CHLORIDE SERPL-SCNC: 99 MMOL/L
CHOLEST SERPL-MCNC: 102 MG/DL
CO2 SERPL-SCNC: 26 MMOL/L
CREAT SERPL-MCNC: 0.71 MG/DL
CREAT SPEC-SCNC: 266 MG/DL
EGFR: 95 ML/MIN/1.73M2
ESTIMATED AVERAGE GLUCOSE: 266 MG/DL
FOLATE SERPL-MCNC: 14.2 NG/ML
GLUCOSE SERPL-MCNC: 160 MG/DL
HBA1C MFR BLD HPLC: 10.9 %
HDLC SERPL-MCNC: 37 MG/DL
LDLC SERPL CALC-MCNC: 49 MG/DL
MICROALBUMIN 24H UR DL<=1MG/L-MCNC: 1.8 MG/DL
MICROALBUMIN/CREAT 24H UR-RTO: 7 MG/G
NONHDLC SERPL-MCNC: 65 MG/DL
POTASSIUM SERPL-SCNC: 3.8 MMOL/L
PROT SERPL-MCNC: 7.6 G/DL
SODIUM SERPL-SCNC: 139 MMOL/L
TRIGL SERPL-MCNC: 80 MG/DL
TSH SERPL-ACNC: 1.11 UIU/ML
VIT B12 SERPL-MCNC: 211 PG/ML

## 2022-05-19 ENCOUNTER — APPOINTMENT (OUTPATIENT)
Dept: FAMILY MEDICINE | Facility: CLINIC | Age: 67
End: 2022-05-19
Payer: COMMERCIAL

## 2022-05-19 VITALS
BODY MASS INDEX: 30.51 KG/M2 | DIASTOLIC BLOOD PRESSURE: 81 MMHG | OXYGEN SATURATION: 98 % | WEIGHT: 189 LBS | SYSTOLIC BLOOD PRESSURE: 124 MMHG | TEMPERATURE: 97.1 F | RESPIRATION RATE: 16 BRPM | HEART RATE: 98 BPM

## 2022-05-19 DIAGNOSIS — K42.9 UMBILICAL HERNIA W/OUT OBSTRUCTION OR GANGRENE: ICD-10-CM

## 2022-05-19 DIAGNOSIS — K42.0 UMBILICAL HERNIA WITH OBSTRUCTION, W/OUT GANGRENE: ICD-10-CM

## 2022-05-19 DIAGNOSIS — Z00.00 ENCOUNTER FOR GENERAL ADULT MEDICAL EXAMINATION W/OUT ABNORMAL FINDINGS: ICD-10-CM

## 2022-05-19 PROCEDURE — 99396 PREV VISIT EST AGE 40-64: CPT | Mod: 25

## 2022-05-19 PROCEDURE — 96372 THER/PROPH/DIAG INJ SC/IM: CPT

## 2022-05-19 RX ORDER — CYANOCOBALAMIN 1000 UG/ML
1000 INJECTION INTRAMUSCULAR; SUBCUTANEOUS
Qty: 0 | Refills: 0 | Status: COMPLETED | OUTPATIENT
Start: 2022-05-19

## 2022-05-19 RX ORDER — BLOOD SUGAR DIAGNOSTIC
STRIP MISCELLANEOUS
Qty: 3 | Refills: 3 | Status: ACTIVE | COMMUNITY
Start: 2021-05-07 | End: 1900-01-01

## 2022-05-19 RX ADMIN — CYANOCOBALAMIN 1 MCG/ML: 1000 INJECTION INTRAMUSCULAR; SUBCUTANEOUS at 00:00

## 2022-05-20 ENCOUNTER — NON-APPOINTMENT (OUTPATIENT)
Age: 67
End: 2022-05-20

## 2022-05-20 NOTE — HEALTH RISK ASSESSMENT
[Good] : ~his/her~  mood as  good [Never] : Never [No] : No [No falls in past year] : Patient reported no falls in the past year [0] : 2) Feeling down, depressed, or hopeless: Not at all (0) [Patient reported PAP Smear was normal] : Patient reported PAP Smear was normal [HIV test declined] : HIV test declined [Hepatitis C test declined] : Hepatitis C test declined [None] : None [With Family] : lives with family [Unemployed] : unemployed [] :  [# Of Children ___] : has [unfilled] children [Feels Safe at Home] : Feels safe at home [Fully functional (bathing, dressing, toileting, transferring, walking, feeding)] : Fully functional (bathing, dressing, toileting, transferring, walking, feeding) [Fully functional (using the telephone, shopping, preparing meals, housekeeping, doing laundry, using] : Fully functional and needs no help or supervision to perform IADLs (using the telephone, shopping, preparing meals, housekeeping, doing laundry, using transportation, managing medications and managing finances) [Smoke Detector] : smoke detector [Seat Belt] :  uses seat belt [TB Exposure] : is being exposed to tuberculosis [Relationship: ___] : Relationship: [unfilled] [FreeTextEntry1] : feeling tired all the time  [de-identified] : no [Change in mental status noted] : No change in mental status noted [Sexually Active] : not sexually active [Reports changes in hearing] : Reports no changes in hearing [Reports changes in vision] : Reports no changes in vision [Reports changes in dental health] : Reports no changes in dental health [PapSmearDate] : 2/2022 [PapSmearComments] : Dr Abrams in [facility] [de-identified] :  and children [AdvancecareDate] : d/w pt today

## 2022-05-20 NOTE — HISTORY OF PRESENT ILLNESS
[de-identified] : 65 y/o PMHX Uncontrolled DM2 HTN, HLD Obesity h/o CVA here for CPE notes has not been eating the right foods and notes is taking her medications and insulin as directed Pt under the whether as per lost her 60 y/o sister and her best friend in the process of couple weeks. 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

## 2022-05-26 ENCOUNTER — APPOINTMENT (OUTPATIENT)
Dept: FAMILY MEDICINE | Facility: CLINIC | Age: 67
End: 2022-05-26
Payer: COMMERCIAL

## 2022-05-26 PROCEDURE — 96372 THER/PROPH/DIAG INJ SC/IM: CPT

## 2022-05-26 RX ORDER — CYANOCOBALAMIN 1000 UG/ML
1000 INJECTION INTRAMUSCULAR; SUBCUTANEOUS
Qty: 0 | Refills: 0 | Status: COMPLETED | OUTPATIENT
Start: 2022-05-26

## 2022-05-26 RX ADMIN — CYANOCOBALAMIN 1 MCG/ML: 1000 INJECTION, SOLUTION INTRAMUSCULAR at 00:00

## 2022-06-02 ENCOUNTER — APPOINTMENT (OUTPATIENT)
Dept: FAMILY MEDICINE | Facility: CLINIC | Age: 67
End: 2022-06-02
Payer: SELF-PAY

## 2022-06-02 PROCEDURE — 96372 THER/PROPH/DIAG INJ SC/IM: CPT

## 2022-06-02 RX ORDER — CYANOCOBALAMIN 1000 UG/ML
1000 INJECTION INTRAMUSCULAR; SUBCUTANEOUS
Qty: 0 | Refills: 0 | Status: COMPLETED | OUTPATIENT
Start: 2022-06-02

## 2022-06-02 RX ADMIN — CYANOCOBALAMIN 1 MCG/ML: 1000 INJECTION INTRAMUSCULAR; SUBCUTANEOUS at 00:00

## 2022-06-09 ENCOUNTER — APPOINTMENT (OUTPATIENT)
Dept: FAMILY MEDICINE | Facility: CLINIC | Age: 67
End: 2022-06-09
Payer: SELF-PAY

## 2022-06-09 PROCEDURE — 96372 THER/PROPH/DIAG INJ SC/IM: CPT

## 2022-06-09 RX ORDER — CYANOCOBALAMIN 1000 UG/ML
1000 INJECTION INTRAMUSCULAR; SUBCUTANEOUS
Qty: 0 | Refills: 0 | Status: COMPLETED | OUTPATIENT
Start: 2022-06-09

## 2022-06-09 RX ADMIN — CYANOCOBALAMIN 1 MCG/ML: 1000 INJECTION INTRAMUSCULAR; SUBCUTANEOUS at 00:00

## 2022-06-16 ENCOUNTER — APPOINTMENT (OUTPATIENT)
Dept: FAMILY MEDICINE | Facility: CLINIC | Age: 67
End: 2022-06-16
Payer: SELF-PAY

## 2022-06-16 PROCEDURE — 96372 THER/PROPH/DIAG INJ SC/IM: CPT

## 2022-06-16 RX ORDER — CYANOCOBALAMIN 1000 UG/ML
1000 INJECTION INTRAMUSCULAR; SUBCUTANEOUS
Qty: 0 | Refills: 0 | Status: COMPLETED | OUTPATIENT
Start: 2022-06-16

## 2022-06-16 RX ADMIN — CYANOCOBALAMIN 1 MCG/ML: 1000 INJECTION INTRAMUSCULAR; SUBCUTANEOUS at 00:00

## 2022-07-21 ENCOUNTER — APPOINTMENT (OUTPATIENT)
Dept: FAMILY MEDICINE | Facility: CLINIC | Age: 67
End: 2022-07-21

## 2022-07-21 DIAGNOSIS — Z86.73 PERSONAL HISTORY OF TRANSIENT ISCHEMIC ATTACK (TIA), AND CEREBRAL INFARCTION W/OUT RESIDUAL DEFICITS: ICD-10-CM

## 2022-07-21 NOTE — HISTORY OF PRESENT ILLNESS
[Diabetes Mellitus] : Diabetes Mellitus [Hyperlipidemia] : Hyperlipidemia [Hypertension] : Hypertension [Obesity] : Obesity [Other: ___] : [unfilled] [FreeTextEntry6] : 68 y/o PMHX DM2 uncontrolled HTN HLD h/o CVA here for f/u medical conditions notes

## 2022-10-24 ENCOUNTER — APPOINTMENT (OUTPATIENT)
Dept: FAMILY MEDICINE | Facility: CLINIC | Age: 67
End: 2022-10-24

## 2022-11-26 ENCOUNTER — RX RENEWAL (OUTPATIENT)
Age: 67
End: 2022-11-26

## 2022-12-09 ENCOUNTER — EMERGENCY (EMERGENCY)
Facility: HOSPITAL | Age: 67
LOS: 1 days | Discharge: HOME | End: 2022-12-09
Attending: INTERNAL MEDICINE | Admitting: INTERNAL MEDICINE
Payer: MEDICARE

## 2022-12-09 VITALS
TEMPERATURE: 98 F | DIASTOLIC BLOOD PRESSURE: 78 MMHG | OXYGEN SATURATION: 99 % | SYSTOLIC BLOOD PRESSURE: 129 MMHG | HEART RATE: 88 BPM | RESPIRATION RATE: 15 BRPM

## 2022-12-09 VITALS
RESPIRATION RATE: 15 BRPM | OXYGEN SATURATION: 96 % | TEMPERATURE: 98 F | SYSTOLIC BLOOD PRESSURE: 113 MMHG | WEIGHT: 190.04 LBS | HEART RATE: 112 BPM | DIASTOLIC BLOOD PRESSURE: 73 MMHG

## 2022-12-09 DIAGNOSIS — Z98.890 OTHER SPECIFIED POSTPROCEDURAL STATES: Chronic | ICD-10-CM

## 2022-12-09 LAB
ALBUMIN SERPL ELPH-MCNC: 3.1 G/DL — LOW (ref 3.3–5)
ALP SERPL-CCNC: 52 U/L — SIGNIFICANT CHANGE UP (ref 40–120)
ALT FLD-CCNC: 30 U/L — SIGNIFICANT CHANGE UP (ref 10–45)
ANION GAP SERPL CALC-SCNC: 10 MMOL/L — SIGNIFICANT CHANGE UP (ref 5–17)
AST SERPL-CCNC: 47 U/L — HIGH (ref 10–40)
BASOPHILS # BLD AUTO: 0.02 K/UL — SIGNIFICANT CHANGE UP (ref 0–0.2)
BASOPHILS NFR BLD AUTO: 0.4 % — SIGNIFICANT CHANGE UP (ref 0–2)
BILIRUB SERPL-MCNC: 0.8 MG/DL — SIGNIFICANT CHANGE UP (ref 0.2–1.2)
BUN SERPL-MCNC: 14 MG/DL — SIGNIFICANT CHANGE UP (ref 7–23)
CALCIUM SERPL-MCNC: 9.3 MG/DL — SIGNIFICANT CHANGE UP (ref 8.4–10.5)
CHLORIDE SERPL-SCNC: 96 MMOL/L — SIGNIFICANT CHANGE UP (ref 96–108)
CO2 SERPL-SCNC: 28 MMOL/L — SIGNIFICANT CHANGE UP (ref 22–31)
CREAT SERPL-MCNC: 0.9 MG/DL — SIGNIFICANT CHANGE UP (ref 0.5–1.3)
EGFR: 71 ML/MIN/1.73M2 — SIGNIFICANT CHANGE UP
EOSINOPHIL # BLD AUTO: 0.01 K/UL — SIGNIFICANT CHANGE UP (ref 0–0.5)
EOSINOPHIL NFR BLD AUTO: 0.2 % — SIGNIFICANT CHANGE UP (ref 0–6)
GLUCOSE SERPL-MCNC: 349 MG/DL — HIGH (ref 70–99)
HCT VFR BLD CALC: 41.2 % — SIGNIFICANT CHANGE UP (ref 34.5–45)
HGB BLD-MCNC: 13.5 G/DL — SIGNIFICANT CHANGE UP (ref 11.5–15.5)
IMM GRANULOCYTES NFR BLD AUTO: 0.5 % — SIGNIFICANT CHANGE UP (ref 0–0.9)
LIDOCAIN IGE QN: 41 U/L — LOW (ref 73–393)
LYMPHOCYTES # BLD AUTO: 0.82 K/UL — LOW (ref 1–3.3)
LYMPHOCYTES # BLD AUTO: 14.5 % — SIGNIFICANT CHANGE UP (ref 13–44)
MCHC RBC-ENTMCNC: 27.8 PG — SIGNIFICANT CHANGE UP (ref 27–34)
MCHC RBC-ENTMCNC: 32.8 GM/DL — SIGNIFICANT CHANGE UP (ref 32–36)
MCV RBC AUTO: 84.8 FL — SIGNIFICANT CHANGE UP (ref 80–100)
MONOCYTES # BLD AUTO: 0.39 K/UL — SIGNIFICANT CHANGE UP (ref 0–0.9)
MONOCYTES NFR BLD AUTO: 6.9 % — SIGNIFICANT CHANGE UP (ref 2–14)
NEUTROPHILS # BLD AUTO: 4.37 K/UL — SIGNIFICANT CHANGE UP (ref 1.8–7.4)
NEUTROPHILS NFR BLD AUTO: 77.5 % — HIGH (ref 43–77)
NRBC # BLD: 0 /100 WBCS — SIGNIFICANT CHANGE UP (ref 0–0)
PLATELET # BLD AUTO: 209 K/UL — SIGNIFICANT CHANGE UP (ref 150–400)
POTASSIUM SERPL-MCNC: 3.5 MMOL/L — SIGNIFICANT CHANGE UP (ref 3.5–5.3)
POTASSIUM SERPL-SCNC: 3.5 MMOL/L — SIGNIFICANT CHANGE UP (ref 3.5–5.3)
PROT SERPL-MCNC: 7.4 G/DL — SIGNIFICANT CHANGE UP (ref 6–8.3)
RBC # BLD: 4.86 M/UL — SIGNIFICANT CHANGE UP (ref 3.8–5.2)
RBC # FLD: 13 % — SIGNIFICANT CHANGE UP (ref 10.3–14.5)
SARS-COV-2 RNA SPEC QL NAA+PROBE: SIGNIFICANT CHANGE UP
SODIUM SERPL-SCNC: 134 MMOL/L — LOW (ref 135–145)
TROPONIN I, HIGH SENSITIVITY RESULT: 5.1 NG/L — SIGNIFICANT CHANGE UP
WBC # BLD: 5.64 K/UL — SIGNIFICANT CHANGE UP (ref 3.8–10.5)
WBC # FLD AUTO: 5.64 K/UL — SIGNIFICANT CHANGE UP (ref 3.8–10.5)

## 2022-12-09 PROCEDURE — 80053 COMPREHEN METABOLIC PANEL: CPT

## 2022-12-09 PROCEDURE — 36415 COLL VENOUS BLD VENIPUNCTURE: CPT

## 2022-12-09 PROCEDURE — 96375 TX/PRO/DX INJ NEW DRUG ADDON: CPT

## 2022-12-09 PROCEDURE — 93005 ELECTROCARDIOGRAM TRACING: CPT

## 2022-12-09 PROCEDURE — 96361 HYDRATE IV INFUSION ADD-ON: CPT

## 2022-12-09 PROCEDURE — 85025 COMPLETE CBC W/AUTO DIFF WBC: CPT

## 2022-12-09 PROCEDURE — 84484 ASSAY OF TROPONIN QUANT: CPT

## 2022-12-09 PROCEDURE — 87635 SARS-COV-2 COVID-19 AMP PRB: CPT

## 2022-12-09 PROCEDURE — 96365 THER/PROPH/DIAG IV INF INIT: CPT

## 2022-12-09 PROCEDURE — 99284 EMERGENCY DEPT VISIT MOD MDM: CPT | Mod: 25

## 2022-12-09 PROCEDURE — 99285 EMERGENCY DEPT VISIT HI MDM: CPT

## 2022-12-09 PROCEDURE — 83690 ASSAY OF LIPASE: CPT

## 2022-12-09 PROCEDURE — 93010 ELECTROCARDIOGRAM REPORT: CPT

## 2022-12-09 RX ORDER — ONDANSETRON 8 MG/1
1 TABLET, FILM COATED ORAL
Qty: 30 | Refills: 0
Start: 2022-12-09 | End: 2022-12-18

## 2022-12-09 RX ORDER — FAMOTIDINE 10 MG/ML
20 INJECTION INTRAVENOUS ONCE
Refills: 0 | Status: COMPLETED | OUTPATIENT
Start: 2022-12-09 | End: 2022-12-09

## 2022-12-09 RX ORDER — SODIUM CHLORIDE 9 MG/ML
1000 INJECTION INTRAMUSCULAR; INTRAVENOUS; SUBCUTANEOUS ONCE
Refills: 0 | Status: COMPLETED | OUTPATIENT
Start: 2022-12-09 | End: 2022-12-09

## 2022-12-09 RX ORDER — FAMOTIDINE 10 MG/ML
1 INJECTION INTRAVENOUS
Qty: 20 | Refills: 0
Start: 2022-12-09 | End: 2022-12-18

## 2022-12-09 RX ORDER — ONDANSETRON 8 MG/1
4 TABLET, FILM COATED ORAL ONCE
Refills: 0 | Status: COMPLETED | OUTPATIENT
Start: 2022-12-09 | End: 2022-12-09

## 2022-12-09 RX ORDER — SODIUM CHLORIDE 9 MG/ML
1000 INJECTION INTRAMUSCULAR; INTRAVENOUS; SUBCUTANEOUS
Refills: 0 | Status: COMPLETED | OUTPATIENT
Start: 2022-12-09 | End: 2022-12-09

## 2022-12-09 RX ORDER — ACETAMINOPHEN 500 MG
975 TABLET ORAL ONCE
Refills: 0 | Status: COMPLETED | OUTPATIENT
Start: 2022-12-09 | End: 2022-12-09

## 2022-12-09 RX ADMIN — SODIUM CHLORIDE 1000 MILLILITER(S): 9 INJECTION INTRAMUSCULAR; INTRAVENOUS; SUBCUTANEOUS at 08:11

## 2022-12-09 RX ADMIN — Medication 975 MILLIGRAM(S): at 10:30

## 2022-12-09 RX ADMIN — ONDANSETRON 4 MILLIGRAM(S): 8 TABLET, FILM COATED ORAL at 08:11

## 2022-12-09 RX ADMIN — SODIUM CHLORIDE 1000 MILLILITER(S): 9 INJECTION INTRAMUSCULAR; INTRAVENOUS; SUBCUTANEOUS at 10:00

## 2022-12-09 RX ADMIN — FAMOTIDINE 20 MILLIGRAM(S): 10 INJECTION INTRAVENOUS at 09:30

## 2022-12-09 RX ADMIN — FAMOTIDINE 200 MILLIGRAM(S): 10 INJECTION INTRAVENOUS at 08:57

## 2022-12-09 RX ADMIN — SODIUM CHLORIDE 1000 MILLILITER(S): 9 INJECTION INTRAMUSCULAR; INTRAVENOUS; SUBCUTANEOUS at 09:30

## 2022-12-09 NOTE — ED PROVIDER NOTE - CLINICAL SUMMARY MEDICAL DECISION MAKING FREE TEXT BOX
67-year-old female with a past medical history of for diabetes for which she is on metformin and Lantus 4 units in the 3 times a day and 20 units at night she also has a history of hypertension and is on lisinopril and hydrochlorothiazide also has dyslipidemia for which she takes atorvastatin she came to the emergency room with a chief complaint of nausea vomiting and diarrhea and feels dehydrated all the symptoms started 4 days ago and she says they are not getting better despite of drinking fluids denies any abdominal pain no fever no chest pain no shortness of breath no back pain no urinary symptoms patient is COVID vaccinated and uses protection all the  Ordered abdominal labs Pepcid IV Zofran IV and IV fluids and will reevaluate the patient 67-year-old female with a past medical history of for diabetes for which she is on metformin and Lantus 4 units in the 3 times a day and 20 units at night she also has a history of hypertension and is on lisinopril and hydrochlorothiazide also has dyslipidemia for which she takes atorvastatin she came to the emergency room with a chief complaint of nausea vomiting and diarrhea and feels dehydrated all the symptoms started 4 days ago and she says they are not getting better despite of drinking fluids denies any abdominal pain no fever no chest pain no shortness of breath no back pain no urinary symptoms patient is COVID vaccinated and uses protection all the  Ordered abdominal labs Pepcid IV Zofran IV and IV fluids and will reevaluate the patient  Patient's labs EKG and troponins are all within normal limits patient has been hydrated.  Got 2 L of normal saline patient is not vomiting anymore tolerating p.o. well plan is to discharge the patient with the medications Pepcid and Zofran and recommended to plenty of fluids hydration and follow-up with a primary care doctor patient's COVID status is negative

## 2022-12-09 NOTE — ED ADULT NURSE NOTE - ISAR MEMORY
Pt arrives ambulatory to triage. Pt reports abdominal pain since December. Pt reports vomiting and diarrhea for a week. Denies fevers. Pt has had diverticulitis in the past. States abd pain is generalized. NAD. Masked.
No

## 2022-12-09 NOTE — ED ADULT NURSE NOTE - OBJECTIVE STATEMENT
Pt presents to ED with c/o n/v and diarrhea starting on Tuesday.  Endorses multiple episodes of nonbloody emesis and diarrhea.  Denies any abdominal pain, urinary complaints, fevers, chills.  Reports feeling dehydrated with generalized weakness.

## 2022-12-09 NOTE — ED PROVIDER NOTE - PHYSICAL EXAMINATION
General:     NAD, well-nourished, well-appearing  Head:     NC/AT, EOMI, oral mucosa dry   Neck:     trachea midline  Lungs:     CTA b/l, no w/r/r  CVS:     S1S2, RRR, no m/g/r  Abd:     + hyperactive BS, s/nt/nd, no organomegaly  Ext:    2+ radial and pedal pulses, no c/c/e  Neuro: AAOx3, no sensory/motor deficits

## 2022-12-09 NOTE — ED PROVIDER NOTE - NSFOLLOWUPINSTRUCTIONS_ED_ALL_ED_FT
Follow up with your PMD within 1-2 days.  Rest, increase your fluids, advance your activity as tolerated.   Take all of your other medications as previously prescribed.   Worsening, continued or ANY new concerning symptoms return to the emergency department. Follow up with your PMD within 1-2 days.  Rest, increase your fluids, advance your activity as tolerated.   Take all of your other medications as previously prescribed.   Worsening, continued or ANY new concerning symptoms return to the emergency department.  Patient recommended oral hydration appropriate for diabetes and Pepcid and Zofran follow-up with primary care doctor in 2 days the COVID status is negative the labs are unremarkable

## 2022-12-09 NOTE — ED PROVIDER NOTE - OBJECTIVE STATEMENT
67-year-old female with a past medical history of for diabetes for which she is on metformin and Lantus 4 units in the 3 times a day and 20 units at night she also has a history of hypertension and is on lisinopril and hydrochlorothiazide also has dyslipidemia for which she takes atorvastatin she came to the emergency room with a chief complaint of nausea vomiting and diarrhea and feels dehydrated all the symptoms started 4 days ago and she says they are not getting better despite of drinking fluids denies any abdominal pain no fever no chest pain no shortness of breath no back pain no urinary symptoms patient is COVID vaccinated and uses protection all the

## 2022-12-09 NOTE — ED PROVIDER NOTE - PATIENT PORTAL LINK FT
You can access the FollowMyHealth Patient Portal offered by [facility] by registering at the following website: [email or web address]. By joining [facility]’s FollowMyHealth portal, you will also be able to view your health information using other applications (apps) compatible with our system.

## 2022-12-09 NOTE — ED ADULT TRIAGE NOTE - CHIEF COMPLAINT QUOTE
Patient complaints of nausea, vomiting and diarrhea. Patient complaints of nausea, vomiting and diarrhea since Tuesday.

## 2022-12-22 ENCOUNTER — NON-APPOINTMENT (OUTPATIENT)
Age: 67
End: 2022-12-22

## 2022-12-22 NOTE — CHART NOTE - NSCHARTNOTEFT_GEN_A_CORE
SW called pt to discuss and assist with follow up care.  Pt is 68 y/o female presented to  Ed for nausea.  Pt did not respond to the call, left message with call back # if further assistance is needed.

## 2023-03-25 LAB
BASOPHILS # BLD AUTO: 0.04 K/UL
BASOPHILS NFR BLD AUTO: 0.8 %
EOSINOPHIL # BLD AUTO: 0.2 K/UL
EOSINOPHIL NFR BLD AUTO: 3.8 %
ESTIMATED AVERAGE GLUCOSE: 335 MG/DL
HBA1C MFR BLD HPLC: 13.3 %
HCT VFR BLD CALC: 40.4 %
HGB BLD-MCNC: 13.3 G/DL
IMM GRANULOCYTES NFR BLD AUTO: 0.4 %
LYMPHOCYTES # BLD AUTO: 2.48 K/UL
LYMPHOCYTES NFR BLD AUTO: 47 %
MAN DIFF?: NORMAL
MCHC RBC-ENTMCNC: 27.9 PG
MCHC RBC-ENTMCNC: 32.9 GM/DL
MCV RBC AUTO: 84.7 FL
MONOCYTES # BLD AUTO: 0.42 K/UL
MONOCYTES NFR BLD AUTO: 8 %
NEUTROPHILS # BLD AUTO: 2.12 K/UL
NEUTROPHILS NFR BLD AUTO: 40 %
PLATELET # BLD AUTO: 253 K/UL
RBC # BLD: 4.77 M/UL
RBC # FLD: 13 %
WBC # FLD AUTO: 5.28 K/UL

## 2023-03-26 LAB
25(OH)D3 SERPL-MCNC: 28.2 NG/ML
ALBUMIN SERPL ELPH-MCNC: 4.3 G/DL
ALP BLD-CCNC: 75 U/L
ALT SERPL-CCNC: 24 U/L
ANION GAP SERPL CALC-SCNC: 14 MMOL/L
AST SERPL-CCNC: 26 U/L
BILIRUB SERPL-MCNC: 0.6 MG/DL
BUN SERPL-MCNC: 9 MG/DL
CALCIUM SERPL-MCNC: 9.9 MG/DL
CHLORIDE SERPL-SCNC: 95 MMOL/L
CHOLEST SERPL-MCNC: 146 MG/DL
CO2 SERPL-SCNC: 27 MMOL/L
CREAT SERPL-MCNC: 0.54 MG/DL
CREAT SPEC-SCNC: 77 MG/DL
EGFR: 102 ML/MIN/1.73M2
FOLATE SERPL-MCNC: 16.8 NG/ML
GLUCOSE SERPL-MCNC: 333 MG/DL
HDLC SERPL-MCNC: 45 MG/DL
LDLC SERPL CALC-MCNC: 81 MG/DL
MICROALBUMIN 24H UR DL<=1MG/L-MCNC: <1.2 MG/DL
MICROALBUMIN/CREAT 24H UR-RTO: NORMAL MG/G
NONHDLC SERPL-MCNC: 101 MG/DL
POTASSIUM SERPL-SCNC: 4.1 MMOL/L
PROT SERPL-MCNC: 7.7 G/DL
SODIUM SERPL-SCNC: 136 MMOL/L
TRIGL SERPL-MCNC: 101 MG/DL
VIT B12 SERPL-MCNC: 335 PG/ML

## 2023-03-27 ENCOUNTER — APPOINTMENT (OUTPATIENT)
Dept: FAMILY MEDICINE | Facility: CLINIC | Age: 68
End: 2023-03-27
Payer: MEDICARE

## 2023-03-27 VITALS — DIASTOLIC BLOOD PRESSURE: 60 MMHG | SYSTOLIC BLOOD PRESSURE: 138 MMHG

## 2023-03-27 VITALS
HEIGHT: 66 IN | SYSTOLIC BLOOD PRESSURE: 149 MMHG | TEMPERATURE: 98 F | BODY MASS INDEX: 30.22 KG/M2 | DIASTOLIC BLOOD PRESSURE: 80 MMHG | WEIGHT: 188 LBS | RESPIRATION RATE: 16 BRPM | OXYGEN SATURATION: 99 % | HEART RATE: 98 BPM

## 2023-03-27 DIAGNOSIS — Z11.1 ENCOUNTER FOR SCREENING FOR RESPIRATORY TUBERCULOSIS: ICD-10-CM

## 2023-03-27 PROCEDURE — 99214 OFFICE O/P EST MOD 30 MIN: CPT

## 2023-03-27 RX ORDER — PEN NEEDLE, DIABETIC 29 G X1/2"
32G X 4 MM NEEDLE, DISPOSABLE MISCELLANEOUS
Qty: 4 | Refills: 3 | Status: ACTIVE | COMMUNITY
Start: 2018-04-20 | End: 1900-01-01

## 2023-03-27 RX ORDER — FLASH GLUCOSE SCANNING READER
EACH MISCELLANEOUS
Qty: 1 | Refills: 0 | Status: ACTIVE | COMMUNITY
Start: 2023-03-27 | End: 1900-01-01

## 2023-03-27 RX ORDER — FLASH GLUCOSE SENSOR
KIT MISCELLANEOUS
Qty: 2 | Refills: 3 | Status: ACTIVE | COMMUNITY
Start: 2021-05-07 | End: 1900-01-01

## 2023-03-27 RX ORDER — ISOPROPYL ALCOHOL 0.7 ML/ML
SWAB TOPICAL
Qty: 1 | Refills: 5 | Status: ACTIVE | COMMUNITY
Start: 2018-04-20 | End: 1900-01-01

## 2023-03-29 PROBLEM — Z11.1 SCREENING FOR TUBERCULOSIS: Status: ACTIVE | Noted: 2023-03-27

## 2023-03-29 NOTE — PHYSICAL EXAM
Caller: HARISH HIRSCH    Relationship to patient: SELF    Best call back number: [phone]    Chief complaint: RIGHT HIP    Type of visit: INJECTION    Requested date: AFTER XMAS, BUT BEFORE END OF YEAR IF POSSIBLE    If rescheduling, when is the original appointment: N/A    Additional notes: PLEASE CALL PATIENT TO SCHEDULE          
[Normal] : soft, non-tender, non-distended, no masses palpated, no HSM and normal bowel sounds

## 2023-03-29 NOTE — HISTORY OF PRESENT ILLNESS
[FreeTextEntry8] : 67 year old female presents for ongoing management of diabetes mellitus. Today's HbA1c is 13.3%, up from 10.9% in May 2022. She needs refills on her diabetes prescriptions including a glucometer and supplies. She feels motivated to improve her numbers. Her BP is also elevated at 149/80 on intake but after resting a while it went to 138/60. 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

## 2023-04-02 LAB
M TB IFN-G BLD-IMP: NEGATIVE
QUANTIFERON TB PLUS MITOGEN MINUS NIL: >10 IU/ML
QUANTIFERON TB PLUS NIL: 0.02 IU/ML
QUANTIFERON TB PLUS TB1 MINUS NIL: -0.01 IU/ML
QUANTIFERON TB PLUS TB2 MINUS NIL: 0 IU/ML

## 2023-04-04 ENCOUNTER — NON-APPOINTMENT (OUTPATIENT)
Age: 68
End: 2023-04-04

## 2023-04-07 ENCOUNTER — NON-APPOINTMENT (OUTPATIENT)
Age: 68
End: 2023-04-07

## 2023-04-14 ENCOUNTER — APPOINTMENT (OUTPATIENT)
Dept: MAMMOGRAPHY | Facility: HOSPITAL | Age: 68
End: 2023-04-14

## 2023-04-14 ENCOUNTER — APPOINTMENT (OUTPATIENT)
Dept: ULTRASOUND IMAGING | Facility: HOSPITAL | Age: 68
End: 2023-04-14

## 2023-04-17 ENCOUNTER — APPOINTMENT (OUTPATIENT)
Dept: ULTRASOUND IMAGING | Facility: HOSPITAL | Age: 68
End: 2023-04-17

## 2023-04-17 ENCOUNTER — APPOINTMENT (OUTPATIENT)
Dept: MAMMOGRAPHY | Facility: HOSPITAL | Age: 68
End: 2023-04-17

## 2023-05-09 ENCOUNTER — APPOINTMENT (OUTPATIENT)
Dept: FAMILY MEDICINE | Facility: CLINIC | Age: 68
End: 2023-05-09

## 2023-06-15 ENCOUNTER — APPOINTMENT (OUTPATIENT)
Dept: FAMILY MEDICINE | Facility: CLINIC | Age: 68
End: 2023-06-15
Payer: MEDICARE

## 2023-06-15 VITALS
HEART RATE: 92 BPM | SYSTOLIC BLOOD PRESSURE: 145 MMHG | DIASTOLIC BLOOD PRESSURE: 82 MMHG | TEMPERATURE: 99 F | BODY MASS INDEX: 30.18 KG/M2 | RESPIRATION RATE: 16 BRPM | WEIGHT: 187 LBS | OXYGEN SATURATION: 96 %

## 2023-06-15 DIAGNOSIS — L30.9 DERMATITIS, UNSPECIFIED: ICD-10-CM

## 2023-06-15 PROCEDURE — 99397 PER PM REEVAL EST PAT 65+ YR: CPT | Mod: GY

## 2023-06-15 PROCEDURE — 99214 OFFICE O/P EST MOD 30 MIN: CPT | Mod: 25

## 2023-06-15 RX ORDER — MAGNESIUM OXIDE 241.3 MG/1000MG
400 TABLET ORAL DAILY
Qty: 30 | Refills: 5 | Status: DISCONTINUED | COMMUNITY
Start: 2021-05-07 | End: 2023-06-15

## 2023-06-15 RX ORDER — HYDROCORTISONE 10 MG/G
1 CREAM TOPICAL
Qty: 30 | Refills: 0 | Status: ACTIVE | COMMUNITY
Start: 2023-06-15 | End: 1900-01-01

## 2023-06-15 RX ORDER — PANTOPRAZOLE 40 MG/1
40 TABLET, DELAYED RELEASE ORAL
Qty: 30 | Refills: 5 | Status: DISCONTINUED | COMMUNITY
Start: 2021-05-07 | End: 2023-06-15

## 2023-06-15 RX ORDER — GABAPENTIN 300 MG/1
300 CAPSULE ORAL
Qty: 90 | Refills: 3 | Status: DISCONTINUED | COMMUNITY
Start: 2021-05-07 | End: 2023-06-15

## 2023-06-15 RX ORDER — AMLODIPINE BESYLATE 10 MG/1
10 TABLET ORAL
Qty: 90 | Refills: 3 | Status: DISCONTINUED | COMMUNITY
Start: 2021-05-06 | End: 2023-06-15

## 2023-06-15 RX ORDER — CLOPIDOGREL BISULFATE 75 MG/1
75 TABLET, FILM COATED ORAL
Qty: 90 | Refills: 3 | Status: DISCONTINUED | COMMUNITY
Start: 2021-05-07 | End: 2023-06-15

## 2023-06-17 PROBLEM — L30.9 ECZEMA: Status: ACTIVE | Noted: 2023-06-15

## 2023-06-17 NOTE — HISTORY OF PRESENT ILLNESS
[de-identified] : 67 year old female presents for annual health maintenance and physical exam as well as hypertension and eczema management. She stopped taking her amlodipine because she developed a gum infection and one of her teeth fell out. She continues to take HCTZ daily. Her BP is 145/82. She would like her HbA1c repeated as it was 13.3% in March. 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

## 2023-06-17 NOTE — PHYSICAL EXAM
[Normal] : no joint swelling and grossly normal strength and tone [de-identified] : missing lower incisor

## 2023-06-22 LAB
ESTIMATED AVERAGE GLUCOSE: 306 MG/DL
HBA1C MFR BLD HPLC: 12.3 %
MEV IGG FLD QL IA: >300 AU/ML
MEV IGG+IGM SER-IMP: POSITIVE
MUV AB SER-ACNC: POSITIVE
MUV IGG SER QL IA: 132 AU/ML
RUBV IGG FLD-ACNC: 20.5 INDEX
RUBV IGG SER-IMP: POSITIVE
VZV AB TITR SER: POSITIVE
VZV IGG SER IF-ACNC: >4000 INDEX

## 2023-07-17 ENCOUNTER — RX RENEWAL (OUTPATIENT)
Age: 68
End: 2023-07-17

## 2023-08-26 ENCOUNTER — RX RENEWAL (OUTPATIENT)
Age: 68
End: 2023-08-26

## 2023-09-07 ENCOUNTER — RX RENEWAL (OUTPATIENT)
Age: 68
End: 2023-09-07

## 2023-09-21 DIAGNOSIS — E55.9 VITAMIN D DEFICIENCY, UNSPECIFIED: ICD-10-CM

## 2023-10-04 NOTE — ED ADULT TRIAGE NOTE - SOURCE OF INFORMATION
Laser Type: KTP laser 532nm Detail Level: Zone Fluence (J/Cm2): 24 Pulse Duration (Msec): 10 Patient Post-Procedure Instructions: Vaseline and ice applied. Post care reviewed with patient. Repetition Rate (Hz): 1 Spot Size: 700 um Consent: Written consent obtained, risks reviewed including but not limited to crusting, scabbing, blistering, scarring, darker or lighter pigmentary change, incidental hair removal, bruising, and/or incomplete removal. Power (Hernandez): 3 Post-Care Instructions: I reviewed with the patient in detail post-care instructions. Patient should stay away from the sun and wear sun protection until treated areas are fully healed.

## 2023-10-27 ENCOUNTER — NON-APPOINTMENT (OUTPATIENT)
Age: 68
End: 2023-10-27

## 2023-10-30 ENCOUNTER — APPOINTMENT (OUTPATIENT)
Dept: FAMILY MEDICINE | Facility: CLINIC | Age: 68
End: 2023-10-30

## 2023-11-13 ENCOUNTER — RX RENEWAL (OUTPATIENT)
Age: 68
End: 2023-11-13

## 2023-11-13 RX ORDER — METFORMIN ER 500 MG 500 MG/1
500 TABLET ORAL
Qty: 180 | Refills: 3 | Status: ACTIVE | COMMUNITY
Start: 2018-04-26 | End: 1900-01-01

## 2024-02-05 NOTE — OCCUPATIONAL THERAPY INITIAL EVALUATION ADULT - NS ASR OT EQUIP NEEDS DISCH
Problem: Pain  Goal: Verbalizes/displays adequate comfort level or baseline comfort level  Outcome: Progressing     Problem: Safety - Adult  Goal: Free from fall injury  Outcome: Progressing     Problem: Nutrition Deficit:  Goal: Optimize nutritional status  Outcome: Progressing      bathing/bedside commode/quad cane

## 2024-02-27 NOTE — STROKE CODE NOTE - NIH STROKE SCALE: TOTAL
citalopram (CELEXA) 40 MG tablet pt is out of medication, pt is scheduled 3/1/24.  Please send to [facility] in [place]   1

## 2024-03-12 NOTE — PATIENT PROFILE ADULT - CAREGIVER NAME
gave pt instructions on how to access pt portal. Detail Level: Detailed melody If grows, needs biopsy Patient stated from gallbladder surgery Cleaned with alcohol, gently probed with 30 gauge 1/2 inch needle, did not express anything, covered with [place] bandaid. \\nRecommended stopping Olay products\\nRecommended cerave hydrating facial cleanser

## 2024-05-22 ENCOUNTER — APPOINTMENT (OUTPATIENT)
Dept: FAMILY MEDICINE | Facility: CLINIC | Age: 69
End: 2024-05-22
Payer: MEDICARE

## 2024-05-22 VITALS
HEART RATE: 86 BPM | WEIGHT: 191 LBS | DIASTOLIC BLOOD PRESSURE: 85 MMHG | SYSTOLIC BLOOD PRESSURE: 181 MMHG | TEMPERATURE: 97.6 F | OXYGEN SATURATION: 98 % | HEIGHT: 66 IN | RESPIRATION RATE: 16 BRPM | BODY MASS INDEX: 30.7 KG/M2

## 2024-05-22 DIAGNOSIS — R53.83 OTHER FATIGUE: ICD-10-CM

## 2024-05-22 DIAGNOSIS — Z13.820 ENCOUNTER FOR SCREENING FOR OSTEOPOROSIS: ICD-10-CM

## 2024-05-22 LAB — HBA1C MFR BLD HPLC: 9.1

## 2024-05-22 PROCEDURE — G0009: CPT

## 2024-05-22 PROCEDURE — 99214 OFFICE O/P EST MOD 30 MIN: CPT

## 2024-05-22 PROCEDURE — G2211 COMPLEX E/M VISIT ADD ON: CPT

## 2024-05-22 PROCEDURE — 83036 HEMOGLOBIN GLYCOSYLATED A1C: CPT | Mod: QW

## 2024-05-22 PROCEDURE — 90677 PCV20 VACCINE IM: CPT

## 2024-05-22 RX ORDER — HYDROCHLOROTHIAZIDE 12.5 MG/1
12.5 CAPSULE ORAL
Qty: 90 | Refills: 3 | Status: DISCONTINUED | COMMUNITY
Start: 2021-05-06 | End: 2024-05-22

## 2024-05-22 RX ORDER — ATORVASTATIN CALCIUM 80 MG/1
80 TABLET, FILM COATED ORAL
Qty: 90 | Refills: 3 | Status: ACTIVE | COMMUNITY
Start: 2018-04-20 | End: 1900-01-01

## 2024-05-22 RX ORDER — METFORMIN HYDROCHLORIDE 1000 MG/1
1000 TABLET, COATED ORAL
Qty: 180 | Refills: 3 | Status: ACTIVE | COMMUNITY
Start: 2023-06-15 | End: 1900-01-01

## 2024-05-22 RX ORDER — ASPIRIN 81 MG/1
81 TABLET, CHEWABLE ORAL
Qty: 90 | Refills: 3 | Status: ACTIVE | COMMUNITY
Start: 2019-02-12 | End: 1900-01-01

## 2024-05-22 RX ORDER — INSULIN GLARGINE 100 [IU]/ML
100 INJECTION, SOLUTION SUBCUTANEOUS
Qty: 3 | Refills: 11 | Status: ACTIVE | COMMUNITY
Start: 2021-05-06 | End: 1900-01-01

## 2024-05-22 RX ORDER — INSULIN ASPART 100 [IU]/ML
100 INJECTION, SOLUTION INTRAVENOUS; SUBCUTANEOUS 3 TIMES DAILY
Qty: 3 | Refills: 3 | Status: ACTIVE | COMMUNITY
Start: 2021-05-07 | End: 1900-01-01

## 2024-05-22 RX ORDER — HYDROCHLOROTHIAZIDE 25 MG/1
25 TABLET ORAL
Qty: 90 | Refills: 3 | Status: ACTIVE | COMMUNITY
Start: 2023-06-15 | End: 1900-01-01

## 2024-05-22 NOTE — REVIEW OF SYSTEMS
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

## 2024-05-22 NOTE — HISTORY OF PRESENT ILLNESS
[FreeTextEntry8] : 69 y/o PMHX uncontrolled DM2HTN HLD h/o CVA here c/o not doing well notes is under a lot stress running around with her  going through pancreatic cancer and is being treated now and is spread to kidneys. Pt notes is praying a lot and states she does not want to lose her  and states this is awful. Pt notes "how did he go to stage IV " pt feels overwelled and states life is not a bowl of cherries. Pt states this am did not take her medication and notes needs rx sent and is using them as directed and also eating a little healthier since has to go buy food for her . Notes also issues in the home has mold in basement and states landlord does not do anything.

## 2024-05-22 NOTE — PHYSICAL EXAM
[No JVD] : no jugular venous distention [Soft] : abdomen soft [Non Tender] : non-tender [Non-distended] : non-distended [No Masses] : no abdominal mass palpated [Normal] : normal gait, coordination grossly intact, no focal deficits and deep tendon reflexes were 2+ and symmetric [Normal Affect] : the affect was normal [Normal Insight/Judgement] : insight and judgment were intact [Comprehensive Foot Exam Normal] : Right and left foot were examined and both feet are normal. No ulcers in either foot. Toes are normal and with full ROM.  Normal tactile sensation with monofilament testing throughout both feet [de-identified] : refused referral Psych or therapist

## 2024-05-23 LAB
ALBUMIN SERPL ELPH-MCNC: 4.1 G/DL
ALP BLD-CCNC: 65 U/L
ALT SERPL-CCNC: 21 U/L
ANION GAP SERPL CALC-SCNC: 15 MMOL/L
AST SERPL-CCNC: 24 U/L
BASOPHILS # BLD AUTO: 0.02 K/UL
BASOPHILS NFR BLD AUTO: 0.4 %
BILIRUB SERPL-MCNC: 0.4 MG/DL
BUN SERPL-MCNC: 11 MG/DL
CALCIUM SERPL-MCNC: 9.5 MG/DL
CHLORIDE SERPL-SCNC: 100 MMOL/L
CHOLEST SERPL-MCNC: 202 MG/DL
CO2 SERPL-SCNC: 22 MMOL/L
CREAT SERPL-MCNC: 0.54 MG/DL
EGFR: 101 ML/MIN/1.73M2
EOSINOPHIL # BLD AUTO: 0.25 K/UL
EOSINOPHIL NFR BLD AUTO: 4.5 %
FOLATE SERPL-MCNC: 7.7 NG/ML
GLUCOSE SERPL-MCNC: 176 MG/DL
HCT VFR BLD CALC: 38.6 %
HDLC SERPL-MCNC: 31 MG/DL
HGB BLD-MCNC: 12.5 G/DL
IMM GRANULOCYTES NFR BLD AUTO: 0.4 %
LDLC SERPL CALC-MCNC: 116 MG/DL
LYMPHOCYTES # BLD AUTO: 2.5 K/UL
LYMPHOCYTES NFR BLD AUTO: 45 %
MAN DIFF?: NORMAL
MCHC RBC-ENTMCNC: 28.7 PG
MCHC RBC-ENTMCNC: 32.4 GM/DL
MCV RBC AUTO: 88.5 FL
MONOCYTES # BLD AUTO: 0.4 K/UL
MONOCYTES NFR BLD AUTO: 7.2 %
NEUTROPHILS # BLD AUTO: 2.36 K/UL
NEUTROPHILS NFR BLD AUTO: 42.5 %
NONHDLC SERPL-MCNC: 171 MG/DL
PLATELET # BLD AUTO: 252 K/UL
POTASSIUM SERPL-SCNC: 3.7 MMOL/L
PROT SERPL-MCNC: 7.6 G/DL
RBC # BLD: 4.36 M/UL
RBC # FLD: 14.2 %
SODIUM SERPL-SCNC: 137 MMOL/L
TRIGL SERPL-MCNC: 314 MG/DL
TSH SERPL-ACNC: 0.82 UIU/ML
VIT B12 SERPL-MCNC: 239 PG/ML
WBC # FLD AUTO: 5.55 K/UL

## 2024-06-12 ENCOUNTER — RX RENEWAL (OUTPATIENT)
Age: 69
End: 2024-06-12

## 2024-06-12 RX ORDER — CHOLECALCIFEROL (VITAMIN D3) 1250 MCG
1.25 MG CAPSULE ORAL
Qty: 12 | Refills: 1 | Status: ACTIVE | COMMUNITY
Start: 2022-05-18 | End: 1900-01-01

## 2024-06-18 PROBLEM — I10 HTN (HYPERTENSION), BENIGN: Status: ACTIVE | Noted: 2019-02-15

## 2024-06-18 PROBLEM — E11.65 UNCONTROLLED TYPE 2 DIABETES MELLITUS WITH HYPERGLYCEMIA: Status: ACTIVE | Noted: 2022-04-25

## 2024-06-18 PROBLEM — E53.8 B12 DEFICIENCY: Status: ACTIVE | Noted: 2022-05-19

## 2024-06-18 PROBLEM — E66.9 OBESITY (BMI 30-39.9): Status: ACTIVE | Noted: 2021-06-01

## 2024-06-19 ENCOUNTER — APPOINTMENT (OUTPATIENT)
Dept: FAMILY MEDICINE | Facility: CLINIC | Age: 69
End: 2024-06-19

## 2024-06-19 DIAGNOSIS — E66.9 OBESITY, UNSPECIFIED: ICD-10-CM

## 2024-06-19 DIAGNOSIS — E11.65 TYPE 2 DIABETES MELLITUS WITH HYPERGLYCEMIA: ICD-10-CM

## 2024-06-19 DIAGNOSIS — E53.8 DEFICIENCY OF OTHER SPECIFIED B GROUP VITAMINS: ICD-10-CM

## 2024-06-19 DIAGNOSIS — I10 ESSENTIAL (PRIMARY) HYPERTENSION: ICD-10-CM

## 2024-06-19 DIAGNOSIS — E78.5 HYPERLIPIDEMIA, UNSPECIFIED: ICD-10-CM

## 2024-06-19 NOTE — HISTORY OF PRESENT ILLNESS
[Diabetes Mellitus] : Diabetes Mellitus [Hyperlipidemia] : Hyperlipidemia [Hypertension] : Hypertension [Obesity] : Obesity [Other: ___] : [unfilled] [FreeTextEntry6] : 70 y/o uncontrolled DM2 HTN HLD obesity here for f/u medical conditions and states

## 2024-11-06 ENCOUNTER — RX CHANGE (OUTPATIENT)
Age: 69
End: 2024-11-06

## 2024-11-06 RX ORDER — INSULIN GLARGINE 100 [IU]/ML
100 INJECTION, SOLUTION SUBCUTANEOUS
Qty: 2 | Refills: 12 | Status: ACTIVE | COMMUNITY
Start: 1900-01-01 | End: 1900-01-01

## 2025-01-30 ENCOUNTER — RX RENEWAL (OUTPATIENT)
Age: 70
End: 2025-01-30

## 2025-05-28 ENCOUNTER — APPOINTMENT (OUTPATIENT)
Dept: FAMILY MEDICINE | Facility: CLINIC | Age: 70
End: 2025-05-28

## 2025-05-28 DIAGNOSIS — I10 ESSENTIAL (PRIMARY) HYPERTENSION: ICD-10-CM

## 2025-05-28 DIAGNOSIS — R42 DIZZINESS AND GIDDINESS: ICD-10-CM

## 2025-05-28 DIAGNOSIS — E78.5 HYPERLIPIDEMIA, UNSPECIFIED: ICD-10-CM

## 2025-05-28 DIAGNOSIS — E53.8 DEFICIENCY OF OTHER SPECIFIED B GROUP VITAMINS: ICD-10-CM

## 2025-05-28 DIAGNOSIS — E11.65 TYPE 2 DIABETES MELLITUS WITH HYPERGLYCEMIA: ICD-10-CM

## 2025-06-24 ENCOUNTER — RX RENEWAL (OUTPATIENT)
Age: 70
End: 2025-06-24

## 2025-07-02 ENCOUNTER — APPOINTMENT (OUTPATIENT)
Dept: FAMILY MEDICINE | Facility: CLINIC | Age: 70
End: 2025-07-02
Payer: MEDICARE

## 2025-07-02 VITALS
RESPIRATION RATE: 16 BRPM | BODY MASS INDEX: 29.25 KG/M2 | WEIGHT: 182 LBS | OXYGEN SATURATION: 98 % | HEART RATE: 81 BPM | TEMPERATURE: 98 F | HEIGHT: 66 IN | SYSTOLIC BLOOD PRESSURE: 179 MMHG | DIASTOLIC BLOOD PRESSURE: 91 MMHG

## 2025-07-02 VITALS — SYSTOLIC BLOOD PRESSURE: 152 MMHG | DIASTOLIC BLOOD PRESSURE: 84 MMHG

## 2025-07-02 PROCEDURE — G2211 COMPLEX E/M VISIT ADD ON: CPT

## 2025-07-02 PROCEDURE — 83036 HEMOGLOBIN GLYCOSYLATED A1C: CPT | Mod: QW

## 2025-07-02 PROCEDURE — 99214 OFFICE O/P EST MOD 30 MIN: CPT

## 2025-07-02 RX ORDER — BLOOD-GLUCOSE,RECEIVER,CONT
EACH MISCELLANEOUS
Qty: 1 | Refills: 5 | Status: ACTIVE | COMMUNITY
Start: 2025-07-02 | End: 1900-01-01

## 2025-07-02 RX ORDER — BLOOD-GLUCOSE SENSOR
EACH MISCELLANEOUS
Qty: 7 | Refills: 3 | Status: ACTIVE | COMMUNITY
Start: 2025-07-02 | End: 1900-01-01

## 2025-07-10 LAB — HBA1C MFR BLD HPLC: 11.9

## 2025-07-18 RX ORDER — HYDROCHLOROTHIAZIDE 12.5 MG/1
12.5 CAPSULE ORAL TWICE DAILY
Qty: 180 | Refills: 3 | Status: ACTIVE | COMMUNITY
Start: 2025-07-18 | End: 1900-01-01

## 2025-07-21 NOTE — PROGRESS NOTE ADULT - ASSESSMENT
[] [facility]  Outpatient Rehabilitation &  Therapy  [address]  [phone]  [phone] [] [facility]  Outpatient Rehabilitation &  Therapy  [address]  P: [phone]  F: [phone] [x] [facility]  Outpatient Rehabilitation &  Therapy  [address]  P: [phone]  F: [phone] [] [facility]  Outpatient Rehabilitation &  Therapy  [address]  [phone]  [phone] [] [facility]  Outpatient Rehabilitation &  Therapy  [address]   P: [phone]  F: [phone]  [] [facility]  Outpatient Rehabilitation &  Therapy  [address]  P: [phone]  F: [phone] [] [facility]  Outpatient Rehabilitation &  Therapy  [address]  P: [phone]  F: [phone] [] [facility]  Outpatient Rehabilitation &  Therapy  [address]  P: [phone]  F: [phone] [] [facility]  Outpatient Rehabilitation &  Therapy  [address]  P: [phone]  F: [phone]  [] [facility] Outpatient Rehabilitation &  Therapy  [address]  P: [phone]  F: [phone]     Physical Therapy Daily Treatment Note    Date:  2025  Patient Name:  Devi Mckinney    :  1962  MRN: [id number]  Physician: Jose G Michel MD                                     Insurance: [employer]; Sher yr; 60/60vs; no auth req; hard max; PT/OT/ST comb; no pt resp-ded met for    Medical Diagnosis: Weakness (R53.1)                      Rehab Codes: R29.6, M25.551  Onset Date: 25                                   Next 's appt: September  Visit# / total visits: 3/12    Cancels/No Shows: 2/0    Subjective:  hips and shoulders both pretty sore today. May have did a little too much yesterday and over the weekend.   Pain:   cva r/o CSE  elevated BP      d/w patient regarding LON and ILR  to arrange LON for next week, wednesday, Dr Strickland. D/w him.

## 2025-07-29 ENCOUNTER — NON-APPOINTMENT (OUTPATIENT)
Age: 70
End: 2025-07-29

## 2025-09-02 ENCOUNTER — NON-APPOINTMENT (OUTPATIENT)
Age: 70
End: 2025-09-02